# Patient Record
Sex: MALE | Race: WHITE | NOT HISPANIC OR LATINO | Employment: OTHER | URBAN - METROPOLITAN AREA
[De-identification: names, ages, dates, MRNs, and addresses within clinical notes are randomized per-mention and may not be internally consistent; named-entity substitution may affect disease eponyms.]

---

## 2022-09-19 ENCOUNTER — HOSPITAL ENCOUNTER (INPATIENT)
Facility: CLINIC | Age: 87
LOS: 1 days | Discharge: ANOTHER HOSPITAL | DRG: 066 | End: 2022-09-21
Attending: EMERGENCY MEDICINE | Admitting: INTERNAL MEDICINE
Payer: COMMERCIAL

## 2022-09-19 ENCOUNTER — APPOINTMENT (OUTPATIENT)
Dept: MRI IMAGING | Facility: CLINIC | Age: 87
DRG: 066 | End: 2022-09-19
Attending: EMERGENCY MEDICINE
Payer: COMMERCIAL

## 2022-09-19 DIAGNOSIS — I44.2 COMPLETE HEART BLOCK (H): ICD-10-CM

## 2022-09-19 DIAGNOSIS — I63.9 CEREBROVASCULAR ACCIDENT (CVA), UNSPECIFIED MECHANISM (H): ICD-10-CM

## 2022-09-19 LAB
ANION GAP SERPL CALCULATED.3IONS-SCNC: 10 MMOL/L (ref 7–15)
BASOPHILS # BLD AUTO: 0 10E3/UL (ref 0–0.2)
BASOPHILS NFR BLD AUTO: 0 %
BUN SERPL-MCNC: 27.8 MG/DL (ref 8–23)
CALCIUM SERPL-MCNC: 9.2 MG/DL (ref 8.8–10.2)
CHLORIDE SERPL-SCNC: 104 MMOL/L (ref 98–107)
CREAT SERPL-MCNC: 1.07 MG/DL (ref 0.67–1.17)
DEPRECATED HCO3 PLAS-SCNC: 27 MMOL/L (ref 22–29)
EOSINOPHIL # BLD AUTO: 0.1 10E3/UL (ref 0–0.7)
EOSINOPHIL NFR BLD AUTO: 1 %
ERYTHROCYTE [DISTWIDTH] IN BLOOD BY AUTOMATED COUNT: 14.3 % (ref 10–15)
GFR SERPL CREATININE-BSD FRML MDRD: 68 ML/MIN/1.73M2
GLUCOSE SERPL-MCNC: 89 MG/DL (ref 70–99)
HCT VFR BLD AUTO: 42.1 % (ref 40–53)
HGB BLD-MCNC: 13.2 G/DL (ref 13.3–17.7)
HOLD SPECIMEN: NORMAL
IMM GRANULOCYTES # BLD: 0 10E3/UL
IMM GRANULOCYTES NFR BLD: 0 %
LYMPHOCYTES # BLD AUTO: 1.8 10E3/UL (ref 0.8–5.3)
LYMPHOCYTES NFR BLD AUTO: 31 %
MCH RBC QN AUTO: 30.9 PG (ref 26.5–33)
MCHC RBC AUTO-ENTMCNC: 31.4 G/DL (ref 31.5–36.5)
MCV RBC AUTO: 99 FL (ref 78–100)
MONOCYTES # BLD AUTO: 0.6 10E3/UL (ref 0–1.3)
MONOCYTES NFR BLD AUTO: 10 %
NEUTROPHILS # BLD AUTO: 3.4 10E3/UL (ref 1.6–8.3)
NEUTROPHILS NFR BLD AUTO: 58 %
NRBC # BLD AUTO: 0 10E3/UL
NRBC BLD AUTO-RTO: 0 /100
PLATELET # BLD AUTO: 133 10E3/UL (ref 150–450)
POTASSIUM SERPL-SCNC: 4.6 MMOL/L (ref 3.4–5.3)
RBC # BLD AUTO: 4.27 10E6/UL (ref 4.4–5.9)
SODIUM SERPL-SCNC: 141 MMOL/L (ref 136–145)
TROPONIN T SERPL HS-MCNC: 26 NG/L
WBC # BLD AUTO: 5.9 10E3/UL (ref 4–11)

## 2022-09-19 PROCEDURE — 99285 EMERGENCY DEPT VISIT HI MDM: CPT | Mod: CS,25

## 2022-09-19 PROCEDURE — A9585 GADOBUTROL INJECTION: HCPCS | Performed by: EMERGENCY MEDICINE

## 2022-09-19 PROCEDURE — 80061 LIPID PANEL: CPT | Performed by: INTERNAL MEDICINE

## 2022-09-19 PROCEDURE — 85025 COMPLETE CBC W/AUTO DIFF WBC: CPT | Performed by: EMERGENCY MEDICINE

## 2022-09-19 PROCEDURE — 51798 US URINE CAPACITY MEASURE: CPT

## 2022-09-19 PROCEDURE — 36415 COLL VENOUS BLD VENIPUNCTURE: CPT | Performed by: EMERGENCY MEDICINE

## 2022-09-19 PROCEDURE — 70549 MR ANGIOGRAPH NECK W/O&W/DYE: CPT

## 2022-09-19 PROCEDURE — 85014 HEMATOCRIT: CPT | Performed by: EMERGENCY MEDICINE

## 2022-09-19 PROCEDURE — 70544 MR ANGIOGRAPHY HEAD W/O DYE: CPT

## 2022-09-19 PROCEDURE — 93005 ELECTROCARDIOGRAM TRACING: CPT

## 2022-09-19 PROCEDURE — 83036 HEMOGLOBIN GLYCOSYLATED A1C: CPT | Performed by: INTERNAL MEDICINE

## 2022-09-19 PROCEDURE — 80048 BASIC METABOLIC PNL TOTAL CA: CPT | Performed by: EMERGENCY MEDICINE

## 2022-09-19 PROCEDURE — 82310 ASSAY OF CALCIUM: CPT | Performed by: EMERGENCY MEDICINE

## 2022-09-19 PROCEDURE — 70553 MRI BRAIN STEM W/O & W/DYE: CPT

## 2022-09-19 PROCEDURE — C9803 HOPD COVID-19 SPEC COLLECT: HCPCS

## 2022-09-19 PROCEDURE — 255N000002 HC RX 255 OP 636: Performed by: EMERGENCY MEDICINE

## 2022-09-19 PROCEDURE — 84484 ASSAY OF TROPONIN QUANT: CPT | Performed by: EMERGENCY MEDICINE

## 2022-09-19 RX ORDER — GADOBUTROL 604.72 MG/ML
10 INJECTION INTRAVENOUS ONCE
Status: COMPLETED | OUTPATIENT
Start: 2022-09-19 | End: 2022-09-19

## 2022-09-19 RX ADMIN — GADOBUTROL 10 ML: 604.72 INJECTION INTRAVENOUS at 23:40

## 2022-09-19 ASSESSMENT — ACTIVITIES OF DAILY LIVING (ADL)
ADLS_ACUITY_SCORE: 33
ADLS_ACUITY_SCORE: 35

## 2022-09-20 ENCOUNTER — APPOINTMENT (OUTPATIENT)
Dept: CARDIOLOGY | Facility: CLINIC | Age: 87
DRG: 066 | End: 2022-09-20
Attending: INTERNAL MEDICINE
Payer: COMMERCIAL

## 2022-09-20 ENCOUNTER — APPOINTMENT (OUTPATIENT)
Dept: PHYSICAL THERAPY | Facility: CLINIC | Age: 87
DRG: 066 | End: 2022-09-20
Attending: INTERNAL MEDICINE
Payer: COMMERCIAL

## 2022-09-20 ENCOUNTER — APPOINTMENT (OUTPATIENT)
Dept: OCCUPATIONAL THERAPY | Facility: CLINIC | Age: 87
DRG: 066 | End: 2022-09-20
Attending: INTERNAL MEDICINE
Payer: COMMERCIAL

## 2022-09-20 PROBLEM — I63.9 CEREBROVASCULAR ACCIDENT (CVA), UNSPECIFIED MECHANISM (H): Status: ACTIVE | Noted: 2022-09-20

## 2022-09-20 LAB
ATRIAL RATE - MUSE: 56 BPM
CHOLEST SERPL-MCNC: 117 MG/DL
DIASTOLIC BLOOD PRESSURE - MUSE: NORMAL MMHG
GLUCOSE BLDC GLUCOMTR-MCNC: 147 MG/DL (ref 70–99)
GLUCOSE BLDC GLUCOMTR-MCNC: 84 MG/DL (ref 70–99)
GLUCOSE BLDC GLUCOMTR-MCNC: 88 MG/DL (ref 70–99)
HBA1C MFR BLD: 5.6 %
HDLC SERPL-MCNC: 63 MG/DL
INTERPRETATION ECG - MUSE: NORMAL
LDLC SERPL CALC-MCNC: 45 MG/DL
LVEF ECHO: NORMAL
NONHDLC SERPL-MCNC: 54 MG/DL
P AXIS - MUSE: NORMAL DEGREES
PR INTERVAL - MUSE: NORMAL MS
QRS DURATION - MUSE: 120 MS
QT - MUSE: 478 MS
QTC - MUSE: 461 MS
R AXIS - MUSE: -35 DEGREES
SARS-COV-2 RNA RESP QL NAA+PROBE: NEGATIVE
SYSTOLIC BLOOD PRESSURE - MUSE: NORMAL MMHG
T AXIS - MUSE: 70 DEGREES
TRIGL SERPL-MCNC: 46 MG/DL
TROPONIN T SERPL HS-MCNC: 23 NG/L
TROPONIN T SERPL HS-MCNC: 24 NG/L
TROPONIN T SERPL HS-MCNC: 26 NG/L
TSH SERPL DL<=0.005 MIU/L-ACNC: 3.94 UIU/ML (ref 0.3–4.2)
VENTRICULAR RATE- MUSE: 56 BPM

## 2022-09-20 PROCEDURE — 97165 OT EVAL LOW COMPLEX 30 MIN: CPT | Mod: GO

## 2022-09-20 PROCEDURE — 93010 ELECTROCARDIOGRAM REPORT: CPT | Performed by: INTERNAL MEDICINE

## 2022-09-20 PROCEDURE — 250N000013 HC RX MED GY IP 250 OP 250 PS 637: Performed by: INTERNAL MEDICINE

## 2022-09-20 PROCEDURE — 93306 TTE W/DOPPLER COMPLETE: CPT | Mod: 26 | Performed by: INTERNAL MEDICINE

## 2022-09-20 PROCEDURE — 999N000208 ECHOCARDIOGRAM COMPLETE

## 2022-09-20 PROCEDURE — 120N000001 HC R&B MED SURG/OB

## 2022-09-20 PROCEDURE — G0425 INPT/ED TELECONSULT30: HCPCS | Mod: G0 | Performed by: NURSE PRACTITIONER

## 2022-09-20 PROCEDURE — U0005 INFEC AGEN DETEC AMPLI PROBE: HCPCS | Performed by: EMERGENCY MEDICINE

## 2022-09-20 PROCEDURE — 84484 ASSAY OF TROPONIN QUANT: CPT | Performed by: EMERGENCY MEDICINE

## 2022-09-20 PROCEDURE — 97530 THERAPEUTIC ACTIVITIES: CPT | Mod: GP | Performed by: PHYSICAL THERAPIST

## 2022-09-20 PROCEDURE — 99221 1ST HOSP IP/OBS SF/LOW 40: CPT | Mod: FS | Performed by: INTERNAL MEDICINE

## 2022-09-20 PROCEDURE — 99207 PR NO BILLABLE SERVICE THIS VISIT: CPT | Performed by: STUDENT IN AN ORGANIZED HEALTH CARE EDUCATION/TRAINING PROGRAM

## 2022-09-20 PROCEDURE — 36415 COLL VENOUS BLD VENIPUNCTURE: CPT | Performed by: INTERNAL MEDICINE

## 2022-09-20 PROCEDURE — 97116 GAIT TRAINING THERAPY: CPT | Mod: GP | Performed by: PHYSICAL THERAPIST

## 2022-09-20 PROCEDURE — 97535 SELF CARE MNGMENT TRAINING: CPT | Mod: GO

## 2022-09-20 PROCEDURE — 255N000002 HC RX 255 OP 636: Performed by: INTERNAL MEDICINE

## 2022-09-20 PROCEDURE — 250N000013 HC RX MED GY IP 250 OP 250 PS 637: Performed by: HOSPITALIST

## 2022-09-20 PROCEDURE — 84443 ASSAY THYROID STIM HORMONE: CPT | Performed by: NURSE PRACTITIONER

## 2022-09-20 PROCEDURE — 999N000226 HC STATISTIC SLP IP EVAL DEFER

## 2022-09-20 PROCEDURE — 97161 PT EVAL LOW COMPLEX 20 MIN: CPT | Mod: GP | Performed by: PHYSICAL THERAPIST

## 2022-09-20 PROCEDURE — 99223 1ST HOSP IP/OBS HIGH 75: CPT | Mod: AI | Performed by: INTERNAL MEDICINE

## 2022-09-20 PROCEDURE — 250N000013 HC RX MED GY IP 250 OP 250 PS 637: Performed by: EMERGENCY MEDICINE

## 2022-09-20 PROCEDURE — 84484 ASSAY OF TROPONIN QUANT: CPT | Performed by: INTERNAL MEDICINE

## 2022-09-20 RX ORDER — ASPIRIN 81 MG/1
81 TABLET ORAL
COMMUNITY
End: 2024-04-17 | Stop reason: ALTCHOICE

## 2022-09-20 RX ORDER — ASPIRIN 81 MG/1
81 TABLET ORAL DAILY
Status: DISCONTINUED | OUTPATIENT
Start: 2022-09-20 | End: 2022-09-21 | Stop reason: HOSPADM

## 2022-09-20 RX ORDER — FAMOTIDINE 40 MG/5ML
20 POWDER, FOR SUSPENSION ORAL 2 TIMES DAILY
Status: DISCONTINUED | OUTPATIENT
Start: 2022-09-20 | End: 2022-09-21 | Stop reason: HOSPADM

## 2022-09-20 RX ORDER — FAMOTIDINE 20 MG/1
20 TABLET, FILM COATED ORAL 2 TIMES DAILY
Status: DISCONTINUED | OUTPATIENT
Start: 2022-09-20 | End: 2022-09-21 | Stop reason: HOSPADM

## 2022-09-20 RX ORDER — ASPIRIN 81 MG/1
81 TABLET, CHEWABLE ORAL DAILY
Status: DISCONTINUED | OUTPATIENT
Start: 2022-09-20 | End: 2022-09-21 | Stop reason: HOSPADM

## 2022-09-20 RX ORDER — LIDOCAINE 40 MG/G
CREAM TOPICAL
Status: DISCONTINUED | OUTPATIENT
Start: 2022-09-20 | End: 2022-09-21 | Stop reason: HOSPADM

## 2022-09-20 RX ORDER — LOSARTAN POTASSIUM 25 MG/1
37.5 TABLET ORAL
COMMUNITY
End: 2024-06-03

## 2022-09-20 RX ORDER — ATORVASTATIN CALCIUM 40 MG/1
40 TABLET, FILM COATED ORAL EVERY EVENING
Status: DISCONTINUED | OUTPATIENT
Start: 2022-09-20 | End: 2022-09-21 | Stop reason: HOSPADM

## 2022-09-20 RX ORDER — ATORVASTATIN CALCIUM 40 MG/1
40 TABLET, FILM COATED ORAL
COMMUNITY
End: 2024-09-09

## 2022-09-20 RX ORDER — MULTIVITAMIN,THERAPEUTIC
1 TABLET ORAL
COMMUNITY

## 2022-09-20 RX ORDER — ASPIRIN 325 MG
325 TABLET ORAL ONCE
Status: COMPLETED | OUTPATIENT
Start: 2022-09-20 | End: 2022-09-20

## 2022-09-20 RX ADMIN — FAMOTIDINE 20 MG: 20 TABLET ORAL at 07:58

## 2022-09-20 RX ADMIN — FAMOTIDINE 20 MG: 20 TABLET ORAL at 20:57

## 2022-09-20 RX ADMIN — ASPIRIN 81 MG: 81 TABLET, COATED ORAL at 07:58

## 2022-09-20 RX ADMIN — ATORVASTATIN CALCIUM 40 MG: 40 TABLET, FILM COATED ORAL at 20:57

## 2022-09-20 RX ADMIN — HUMAN ALBUMIN MICROSPHERES AND PERFLUTREN 3 ML: 10; .22 INJECTION, SOLUTION INTRAVENOUS at 14:23

## 2022-09-20 RX ADMIN — ASPIRIN 325 MG ORAL TABLET 325 MG: 325 PILL ORAL at 01:30

## 2022-09-20 ASSESSMENT — ACTIVITIES OF DAILY LIVING (ADL)
WEAR_GLASSES_OR_BLIND: YES
WALKING_OR_CLIMBING_STAIRS_DIFFICULTY: YES
ADLS_ACUITY_SCORE: 28
ADLS_ACUITY_SCORE: 35
ADLS_ACUITY_SCORE: 35
ADLS_ACUITY_SCORE: 28
VISION_MANAGEMENT: GLASSES
NUMBER_OF_TIMES_PATIENT_HAS_FALLEN_WITHIN_LAST_SIX_MONTHS: 1
ADLS_ACUITY_SCORE: 35
EQUIPMENT_CURRENTLY_USED_AT_HOME: CANE, STRAIGHT
CHANGE_IN_FUNCTIONAL_STATUS_SINCE_ONSET_OF_CURRENT_ILLNESS/INJURY: YES
ADLS_ACUITY_SCORE: 35
DIFFICULTY_EATING/SWALLOWING: NO
ADLS_ACUITY_SCORE: 41
CONCENTRATING,_REMEMBERING_OR_MAKING_DECISIONS_DIFFICULTY: NO
ADLS_ACUITY_SCORE: 41
WALKING_OR_CLIMBING_STAIRS: STAIR CLIMBING DIFFICULTY, REQUIRES EQUIPMENT
DOING_ERRANDS_INDEPENDENTLY_DIFFICULTY: NO
ADLS_ACUITY_SCORE: 35
TRANSFERRING: 0-->INDEPENDENT
FALL_HISTORY_WITHIN_LAST_SIX_MONTHS: YES
ADLS_ACUITY_SCORE: 28
ADLS_ACUITY_SCORE: 35
ADLS_ACUITY_SCORE: 41
TOILETING_ISSUES: NO
HEARING_DIFFICULTY_OR_DEAF: YES
DRESSING/BATHING_DIFFICULTY: NO
TRANSFERRING: 0-->INDEPENDENT

## 2022-09-20 ASSESSMENT — ENCOUNTER SYMPTOMS
SPEECH DIFFICULTY: 0
ABDOMINAL PAIN: 0
WEAKNESS: 0
NUMBNESS: 0
DIZZINESS: 1

## 2022-09-20 NOTE — CONSULTS
Care Management Note    Length of Stay (days): 0    Expected Discharge Date: 09/21/2022     Concerns to be Addressed: discharge planning      Patient plan of care discussed at interdisciplinary rounds: Yes    Anticipated Discharge Disposition: Home     Anticipated Discharge Services:  OP PT  Anticipated Discharge DME:      Referrals Placed by CM/SW:    Private pay costs discussed: Not applicable    Additional Information:  Received CM/SW consult for discharge planning as part of stroke order set. Noted PT has assessed pt and currently recommending home with assist; home with OP PT. Appears pt is from home with his spouse. Discussed with nursing who does not anticipate any CM/SW needs at this time.     SW will not continue to follow. Please alert SW or place new consult if needs arise.     CARLY Braun, LSW  Inpatient Care Coordination  MS3, [facility]  [phone]    CARLY Kingsley

## 2022-09-20 NOTE — PROGRESS NOTES
Speech Language Pathology: Orders received. Chart reviewed and discussed with care team.?This patient does not currently present with facial weakness, slurred speech or communication impairment.  He passed RN swallow screen without difficulty and a regular diet with thin liquids has been ordered.  Speech Language Pathology not indicated at this time due to intact speech and swallow skills.? Defer discharge recommendations to medical team and OT/PT.? Will complete orders.

## 2022-09-20 NOTE — ED PROVIDER NOTES
History   Chief Complaint:  Dizziness and Balance Issues    The history is provided by the patient.      Benjamin Elizabeth is a 87 year old male with history of coronary artery disease, hypertension, and ascending aorta dilation who presents with dizziness and balance issues which waxes and wanes but has been continuous for three days. Patient was told to come to ED after visiting Urgent car today. Patient denies falling. Patient denies chest pain, headache, numbness, weakness, or abdominal pain. Patient states he had vertigo years ago but states symptoms feel different than vertigo. Patient reports he has never had a stroke or atrial fibrillation. Patient's wife states the patient's speech has not changed.    Review of Systems   Cardiovascular: Negative for chest pain.   Gastrointestinal: Negative for abdominal pain.   Neurological: Positive for dizziness. Negative for speech difficulty, weakness and numbness.        + Balance Issues   All other systems reviewed and are negative.        Allergies:  No Known Allergies    Medications:  Aspirin 81 mg  Terbinafine  Losartan  Atorvastatin    Past Medical History:     Dyslipidemia  Ascending aorta dilation  Nonheumatic aortic valve insufficiency  Nonheumatic mitral valve regurgitation  White coat syndrome with hypertension  Coronary artery disease involving native coronary artery of native heart without angina pectoris  Anemia due to vitamin B12 deficiency  BPH  Degenerative disc disease lumbar  Cataract extraction status  ECG abnormality, first degree av block and left axis  Cervicalgia  Osteoarthritis of hip  Senile nuclear cataract  Vitreous degeneration  Migraine with aura  Hearing loss  Essential hypertension  Essential hypertension     Past Surgical History:    SURGICAL HX - NEG  CATARACT REMOVALT W/CORNEO-SCLERAL, right, left    Family History:    Father- cancer, hypertension  Mother- cataract, glaucoma, hypertension     Social History:  PCP: Clinic,  [facility]   Patient arrived by car  Patient presents with wife    Physical Exam     Patient Vitals for the past 24 hrs:   BP Temp Temp src Pulse Resp SpO2 Weight   09/19/22 1925 (!) 121/93 97.3  F (36.3  C) Temporal 54 20 98 % 91.8 kg (202 lb 6.1 oz)       Physical Exam  Constitutional:       General: He is not in acute distress.     Appearance: He is not diaphoretic.   HENT:      Head: Atraumatic.   Eyes:      General: No scleral icterus.     Pupils: Pupils are equal, round, and reactive to light.   Cardiovascular:      Rate and Rhythm: Regular rhythm. Bradycardia present.      Heart sounds: Normal heart sounds.      Comments: Well perfused  Pulmonary:      Effort: No respiratory distress.      Breath sounds: Normal breath sounds.      Comments: No accessory muscle use  Abdominal:      General: Bowel sounds are normal.      Palpations: Abdomen is soft.      Tenderness: There is no abdominal tenderness.   Musculoskeletal:         General: No tenderness.      Cervical back: Neck supple.   Skin:     General: Skin is warm.      Findings: No rash.   Neurological:      Comments: Speech is fluent.  No facial asymmetry.  No nystagmus.   strength 5 out of 5 and a bilaterally.  Strength in lower extremities 5 out of 5 and equal bilaterally.  The patient does have an unsteady gait and requires assistance to ambulate.  Sensation light touch intact and symmetric over the face, arm, and legs.  No arm drift.  No finger-nose dysmetria.           Emergency Department Course   ECG  ECG taken at 1952, ECG read at 1959  Atrial fibrillation with slow ventricular response   Left axis deviation  Incomplete left bundle branch block  Nonspecific ST and T wave abnormality  Type 3 block  Rate 56 bpm. IA interval * ms. QRS duration 120 ms. QT/QTc 478/461 ms. P-R-T axes * -35 70.     Imaging:  MR Head w/o Contrast Angiogram   Final Result   IMPRESSION:   HEAD MRI:    1.  Equivocal punctate focus of ischemic lacunar infarction in  the deep right frontal white matter.   2.  Age-related changes with multifocal areas of chronic infarction as described in detail above.      HEAD MRA:    1.  No aneurysm, high flow AVM or significant stenosis identified.   2.  Variant [place] anatomy as above.      NECK MRA:   1.  No measurable stenosis or dissection.      MR Brain w/o & w Contrast   Final Result   IMPRESSION:   HEAD MRI:    1.  Equivocal punctate focus of ischemic lacunar infarction in the deep right frontal white matter.   2.  Age-related changes with multifocal areas of chronic infarction as described in detail above.      HEAD MRA:    1.  No aneurysm, high flow AVM or significant stenosis identified.   2.  Variant [place] anatomy as above.      NECK MRA:   1.  No measurable stenosis or dissection.      MRA Angiogram Neck w/o & w Contrast   Final Result   IMPRESSION:   HEAD MRI:    1.  Equivocal punctate focus of ischemic lacunar infarction in the deep right frontal white matter.   2.  Age-related changes with multifocal areas of chronic infarction as described in detail above.      HEAD MRA:    1.  No aneurysm, high flow AVM or significant stenosis identified.   2.  Variant [place] anatomy as above.      NECK MRA:   1.  No measurable stenosis or dissection.        Report per radiology    Laboratory:  Labs Ordered and Resulted from Time of ED Arrival to Time of ED Departure   TROPONIN T, HIGH SENSITIVITY - Abnormal       Result Value    Troponin T, High Sensitivity 26 (*)    BASIC METABOLIC PANEL - Abnormal    Sodium 141      Potassium 4.6      Chloride 104      Carbon Dioxide (CO2) 27      Anion Gap 10      Urea Nitrogen 27.8 (*)     Creatinine 1.07      Calcium 9.2      Glucose 89      GFR Estimate 68     CBC WITH PLATELETS AND DIFFERENTIAL - Abnormal    WBC Count 5.9      RBC Count 4.27 (*)     Hemoglobin 13.2 (*)     Hematocrit 42.1      MCV 99      MCH 30.9      MCHC 31.4 (*)     RDW 14.3      Platelet Count 133  (*)     % Neutrophils 58      % Lymphocytes 31      % Monocytes 10      % Eosinophils 1      % Basophils 0      % Immature Granulocytes 0      NRBCs per 100 WBC 0      Absolute Neutrophils 3.4      Absolute Lymphocytes 1.8      Absolute Monocytes 0.6      Absolute Eosinophils 0.1      Absolute Basophils 0.0      Absolute Immature Granulocytes 0.0      Absolute NRBCs 0.0     COVID-19 VIRUS (CORONAVIRUS) BY PCR      Emergency Department Course:     Reviewed:  I reviewed nursing notes, vitals, past medical history and Care Everywhere    Assessments:  2055 I obtained history and examined the patient as noted above.   0020 I rechecked the patient and explained findings.     Consults:  0016 I consulted with Dr. Engle, stroke/neuro, regarding the patient's history and presentation here in the emergency department.  0025 I consulted with Dr. Alves, hospitalist, regarding the patient's history and presentation here in the emergency department who accepted the patient for admission.  0035 I consulted with Dr. Wood, cardiology, regarding the patient's history and presentation here in the emergency department and his EKG.    Interventions:  2340 Gadobutrol 10 mL IV    Disposition:  The patient was admitted to the hospital under the care of Dr. Alves.     Impression & Plan     Medical Decision Making:  This patient is an 87-year-old man who presents to the ED for further evaluation of gait ataxia.  He was referred from his outpatient clinic.  MRI does not appear to demonstrate a posterior circulation stroke at this time.  No vascular dissection or significant occlusion.  He does have what appears to be a subacute infarct in the right frontal lobe.  This was reviewed with stroke neurology.  At this point aspirin is recommended.    The patient is known to have coronary artery disease.  Per my review of the record and talking the patient he has not been known to have any dysrhythmia in the past.  EKG today was reviewed with  Dr. Vyas cardiology and I appreciate his input.  He was concerned that this is a complete heart block.  However, no immediate intervention is recommended given adequate heart rate and blood pressure and mental status.  Cardiology will formally consult in the morning for further recommendations.  He may eventually require pacemaker.    The patient will be admitted to the hospital service for further work-up.    Diagnosis:    ICD-10-CM    1. Cerebrovascular accident (CVA), unspecified mechanism (H)  I63.9    2. Complete heart block (H)  I44.2        Scribe Disclosure:  I, Andreea Dooley, am serving as a scribe at 10:54 PM on 9/19/2022 to document services personally performed by Lester Bowman MD based on my observations and the provider's statements to me.            Lester Bowman MD  09/20/22 0109

## 2022-09-20 NOTE — PHARMACY-ADMISSION MEDICATION HISTORY
Admission medication history interview status for this patient is complete. See [facility] admission navigator for allergy information, prior to admission medications and immunization status.     Medication history interview done, indicate source(s): Patient  Medication history resources (including written lists, pill bottles, clinic record):None  Pharmacy: [facility]    Changes made to PTA medication list:  Added: all  Changed: none  Reported as Not Taking: none  Removed: none    Actions taken by pharmacist (provider contacted, etc):None     Additional medication history information:None    Medication reconciliation/reorder completed by provider prior to medication history?  N   (Y/N)       Prior to Admission medications    Medication Sig Last Dose Taking? Auth Provider Long Term End Date   aspirin 81 MG EC tablet Take 81 mg by mouth daily 9/18/2022 at pm Yes Unknown, Entered By History     atorvastatin (LIPITOR) 40 MG tablet Take 40 mg by mouth daily 9/18/2022 at pm Yes Unknown, Entered By History Yes    losartan (COZAAR) 25 MG tablet Take 37.5 mg by mouth daily 9/18/2022 at pm Yes Unknown, Entered By History Yes    multivitamin, therapeutic (THERA-VIT) TABS tablet Take 1 tablet by mouth daily 9/18/2022 at pm Yes Unknown, Entered By History

## 2022-09-20 NOTE — ED NOTES
[facility]  ED Nurse Handoff Report    Benjamin Elizabeth is a 87 year old male   ED Chief complaint: Dizziness  . ED Diagnosis:   Final diagnoses:   Cerebrovascular accident (CVA), unspecified mechanism (H)   Complete heart block (H)     Allergies: No Known Allergies    Code Status: Full Code  Activity level - Baseline/Home:  Independent. Activity Level - Current:   Stand by Assist. Lift room needed: No. Bariatric: No   Needed: No   Isolation: No. Infection: Not Applicable.     Vital Signs:   Vitals:    09/19/22 1925 09/20/22 0136   BP: (!) 121/93 (!) 178/77   Pulse: 54    Resp: 20    Temp: 97.3  F (36.3  C)    TempSrc: Temporal    SpO2: 98%    Weight: 91.8 kg (202 lb 6.1 oz)        Cardiac Rhythm:  ,      Pain level:    Patient confused: Yes. Patient Falls Risk: Yes.   Elimination Status: Has voided   Patient Report - Initial Complaint: dizziness. Focused Assessment: Pt arrives to ED with c/o feeling off balance and dizziness. Pt states this began Friday morning. Pt normally can walk on his own but it usign a cane right now. Denies weakness on one side or certain extremeties. Pt was seen at  and told to come to ED for MRI to r/o stroke.   Tests Performed: labs, imaging Abnormal Results:   Labs Ordered and Resulted from Time of ED Arrival to Time of ED Departure   TROPONIN T, HIGH SENSITIVITY - Abnormal       Result Value    Troponin T, High Sensitivity 26 (*)    BASIC METABOLIC PANEL - Abnormal    Sodium 141      Potassium 4.6      Chloride 104      Carbon Dioxide (CO2) 27      Anion Gap 10      Urea Nitrogen 27.8 (*)     Creatinine 1.07      Calcium 9.2      Glucose 89      GFR Estimate 68     CBC WITH PLATELETS AND DIFFERENTIAL - Abnormal    WBC Count 5.9      RBC Count 4.27 (*)     Hemoglobin 13.2 (*)     Hematocrit 42.1      MCV 99      MCH 30.9      MCHC 31.4 (*)     RDW 14.3      Platelet Count 133 (*)     % Neutrophils 58      % Lymphocytes 31      % Monocytes 10      % Eosinophils  1      % Basophils 0      % Immature Granulocytes 0      NRBCs per 100 WBC 0      Absolute Neutrophils 3.4      Absolute Lymphocytes 1.8      Absolute Monocytes 0.6      Absolute Eosinophils 0.1      Absolute Basophils 0.0      Absolute Immature Granulocytes 0.0      Absolute NRBCs 0.0     COVID-19 VIRUS (CORONAVIRUS) BY PCR     MR Head w/o Contrast Angiogram   Final Result   IMPRESSION:   HEAD MRI:    1.  Equivocal punctate focus of ischemic lacunar infarction in the deep right frontal white matter.   2.  Age-related changes with multifocal areas of chronic infarction as described in detail above.      HEAD MRA:    1.  No aneurysm, high flow AVM or significant stenosis identified.   2.  Variant [place] of [place] anatomy as above.      NECK MRA:   1.  No measurable stenosis or dissection.      MR Brain w/o & w Contrast   Final Result   IMPRESSION:   HEAD MRI:    1.  Equivocal punctate focus of ischemic lacunar infarction in the deep right frontal white matter.   2.  Age-related changes with multifocal areas of chronic infarction as described in detail above.      HEAD MRA:    1.  No aneurysm, high flow AVM or significant stenosis identified.   2.  Variant [place] anatomy as above.      NECK MRA:   1.  No measurable stenosis or dissection.      MRA Angiogram Neck w/o & w Contrast   Final Result   IMPRESSION:   HEAD MRI:    1.  Equivocal punctate focus of ischemic lacunar infarction in the deep right frontal white matter.   2.  Age-related changes with multifocal areas of chronic infarction as described in detail above.      HEAD MRA:    1.  No aneurysm, high flow AVM or significant stenosis identified.   2.  Variant [place] anatomy as above.      NECK MRA:   1.  No measurable stenosis or dissection.        Treatments provided: See MAR  Family Comments: family was at bedside and is aware of pt status  OBS brochure/video discussed/provided to patient:  No  ED Medications:   Medications   gadobutrol  (GADAVIST) injection 10 mL (10 mLs Intravenous Given 9/19/22 2340)   aspirin (ASA) tablet 325 mg (325 mg Oral Given 9/20/22 0130)     Drips infusing:  No  For the majority of the shift, the patient's behavior Green. Interventions performed were N/A.    Sepsis treatment initiated: No     Patient tested for COVID 19 prior to admission: YES    ED Nurse Name/Phone Number: Maile Jimenez RN,   1:43 AM    RECEIVING UNIT ED HANDOFF REVIEW    Above ED Nurse Handoff Report was reviewed: Yes  Reviewed by: Miya Evans RN on September 20, 2022 at 9:03 AM

## 2022-09-20 NOTE — PROGRESS NOTES
09/20/22 1500   Quick Adds   Type of Visit Initial Occupational Therapy Evaluation   Living Environment   People in Home spouse   Current Living Arrangements house   Home Accessibility stairs to enter home;stairs within home   Number of Stairs, Main Entrance 2   Stair Railings, Main Entrance railings safe and in good condition   Stair Railings, Within Home, Primary railings safe and in good condition   Transportation Anticipated family or friend will provide   Living Environment Comments 2 story home. tub shower combo   Self-Care   Usual Activity Tolerance good   Current Activity Tolerance moderate   Regular Exercise Yes   Activity/Exercise Type walking  ([place])   Exercise Amount/Frequency 2 times/wk   Equipment Currently Used at Home none   Fall history within last six months no   Activity/Exercise/Self-Care Comment typically indp with self care and ADL   Instrumental Activities of Daily Living (IADL)   IADL Comments indp baseline driving, med mgmt, cleaning. etc. wife does cooking   General Information   Onset of Illness/Injury or Date of Surgery 09/19/22   Referring Physician Tanja Alves MD   Patient/Family Therapy Goal Statement (OT) to go home   Additional Occupational Profile Info/Pertinent History of Current Problem Benjamin Elizabeth is a 87 year old male with past medical history significant for hypertension, hyperlipidemia, coronary artery disease presented with gait instability and lightheadedness for 3 days.found to have acute CVA   Existing Precautions/Restrictions fall   General Observations and Info agreeable to OT   Cognitive Status Examination   Orientation Status orientation to person, place and time   Affect/Mental Status (Cognitive) WNL   Follows Commands WNL   Cognitive Status Comments pt able to recall medications and room number during [facility]. [place]. aware of safety needs and good judement noted during tasks. problem solving observed during  shower tx   Visual Perception   Visual  Impairment/Limitations WFL   Sensory   Sensory Quick Adds No deficits were identified   Pain Assessment   Patient Currently in Pain Yes, see Vital Sign flowsheet   Integumentary/Edema   Integumentary/Edema Comments B LUE edema noted below knee   Posture   Posture forward head position;protracted shoulders   Range of Motion Comprehensive   Comment, General Range of Motion WFL for ADL   Strength Comprehensive (MMT)   Comment, General Manual Muscle Testing (MMT) Assessment WFL for ADL   Muscle Tone Assessment   Muscle Tone Quick Adds No deficits were identified   Coordination   Upper Extremity Coordination No deficits were identified   Bed Mobility   Bed Mobility No deficits identified   Transfers   Transfers sit-stand transfer   Sit-Stand Transfer   Sit-Stand [place] (Transfers) supervision   Assistive Device (Sit-Stand Transfers) walker, front-wheeled   Activities of Daily Living   BADL Assessment/Intervention lower body dressing;upper body dressing   Upper Body Dressing Assessment/Training   [place] Level (Upper Body Dressing) independent   Lower Body Dressing Assessment/Training   Comment, (Lower Body Dressing) seated EOB   [place] Level (Lower Body Dressing) contact guard assist   Clinical Impression   Criteria for Skilled Therapeutic Interventions Met (OT) Yes, treatment indicated   OT Diagnosis decreased function in ADL   OT Problem List-Impairments impacting ADL problems related to;activity tolerance impaired;flexibility;mobility   Assessment of Occupational Performance 3-5 Performance Deficits   Identified Performance Deficits bathing, transfers, LB dressing   Planned Therapy Interventions (OT) ADL retraining;progressive activity/exercise;home program guidelines   Clinical Decision Making Complexity (OT) low complexity   Anticipated Equipment Needs Upon Discharge (OT) shower chair   Risk & Benefits of therapy have been explained evaluation/treatment results reviewed;care plan/treatment goals  reviewed;risks/benefits reviewed;current/potential barriers reviewed;participants voiced agreement with care plan;participants included;patient   Clinical Impression Comments decreased function in aDL warrants skilled Ip OT tx   OT Discharge Planning   OT Discharge Recommendation (DC Rec) home with assist   OT Rationale for DC Rec anticipate with skilled IP OT tx pt will be able to return to home safely with assist from wife for ADL. appears to be close to baseline function, mild deficits in balance, strength, activity tolerance. will benefit from iP OT for safe discharge to home. no OT needs anticipated at discharge. supportive wife and high PLOF baseline in ADL/IADL   Total Evaluation Time (Minutes)   Total Evaluation Time (Minutes) 10   OT Goals   Therapy Frequency (OT) Daily   OT Predicted Duration/Target Date for Goal Attainment 09/24/22   OT Goals Toilet Transfer/Toileting;Hygiene/Grooming;OT Goal 1;OT Goal 2   OT: Hygiene/Grooming independent;while standing   OT: Toilet Transfer/Toileting Independent   OT: Goal 1 Pt will verbalize 3 safety techniques for home   OT: Goal 2 Pt will demo tub/shower tx with mod I and bench or chair

## 2022-09-20 NOTE — CONSULTS
"      [facility]    Stroke Telephone Note    I was called by Lester Carroll on 09/20/22 regarding patient Benjamin Elizabeth. The patient is a 87 year old male w/ PMHx of HTN who presents with dizziness and balance issues that were waxing and waning for the past 3 days. No focal symptoms on exam. New onset afib seen in the ED. He states that the symptoms feel different than vertigo. He has no other symptoms such as speech changes or dysarthria.     Imaging Findings                                                                    IMPRESSION:  HEAD MRI:   1.  Equivocal punctate focus of ischemic lacunar infarction in the deep right frontal white matter.  2.  Age-related changes with multifocal areas of chronic infarction as described in detail above.     HEAD MRA:   1.  No aneurysm, high flow AVM or significant stenosis identified.  2.  Variant [place] of [place]ms anatomy as above.     NECK MRA:  1.  No measurable stenosis or dissection.    Impression  Acute ischemic stroke of R frontal  due to cardioembolism- given new onset Afib most likely a subacute punctate stroke due to small vessel disease vs cardioembolism. Further evaluation with stroke work-up is needed.     Balance abnormalities- stroke is likely an incidental finding and doesn't correlate with the balance issues for the past 3 days.     Recommendations   - Use orderset: \"Ischemic Stroke Routine Admission\" or \"Ischemic Stroke No Thrombolytics/No Thrombectomy ICU Admission\"  - Neurochecks and Vital Signs every Q4H   - Permissive HTN; goal SBP < 220 mmHg  - Daily aspirin 81 mg for secondary stroke prevention  - Statin: Lipitor 40mg  - TTE (with Bubble Study if age 60 yrs or less)  - Telemetry, EKG  - Bedside Glucose Monitoring  - A1c, Lipid Panel, Troponin x 3  - PT/OT/SLP  - Stroke Education  - Euthermia, Euglycemia   -Will likely need anticoagulation long term  -Long term goals, BP <130/80, LDL <70, A1c <7      My recommendations " "are based on the information provided over the phone by Benjamin Elizabeth's in-person providers. They are not intended to replace the clinical judgment of his in-person providers. I was not requested to personally see or examine the patient at this time.    The Stroke Staff is Dr. Stapleton .    Melissa Engle MD  Vascular Neurology Fellow  To page me or covering stroke neurology team member, click here: AMCOM   Choose \"On Call\" tab at top, then search dropdown box for \"Neurology Adult\", select location, press Enter, then look for stroke/neuro ICU/telestroke.        "

## 2022-09-20 NOTE — PROGRESS NOTES
09/20/22 1443   Quick Adds   Type of Visit Initial PT Evaluation   Living Environment   People in Home spouse   Current Living Arrangements house   Home Accessibility stairs to enter home;stairs within home   Number of Stairs, Main Entrance 2   Stair Railings, Main Entrance railings safe and in good condition   Number of Stairs, Within Home, Primary greater than 10 stairs  (to bedroom level)   Stair Railings, Within Home, Primary railings safe and in good condition   Transportation Anticipated family or friend will provide   Living Environment Comments lives in a 2 story home   Self-Care   Usual Activity Tolerance good   Current Activity Tolerance moderate   Regular Exercise Yes   Activity/Exercise Type other (see comments);walking  (golfs weekly; treamill)   Exercise Amount/Frequency 2 times/wk   Equipment Currently Used at Home none   Fall history within last six months no   Activity/Exercise/Self-Care Comment normally independent with mobility and cares   General Information   Onset of Illness/Injury or Date of Surgery 09/19/22   Referring Physician Tanja Alves MD   Patient/Family Therapy Goals Statement (PT) return home with spouse   Pertinent History of Current Problem (include personal factors and/or comorbidities that impact the POC) per chart: Benjamin Elizabeth is a 87 year old male with PMH of HTN, HLD, migraine with aura and CAD. He presented to the [facility] ED 9/19/2022 for evaluation of gait instability and lightheadedness. At the time of presentation he reported 3 days of waxing/wanning dizziness and imbalance. He was found to be in new atrial fibrillation; MRI:equivocal punctate focus of ischemic lacunar infarct in the deep right frontal white matter; Type I second-degree AV block Ursula; see medical record for further information   Existing Precautions/Restrictions fall   Heart Disease Risk Factors Medical history   General Observations patient in bed; agreeable to session   Cognition    Orientation Status (Cognition) oriented x 4   Cognitive Status Comments appears intact; forgetful at times   Pain Assessment   Patient Currently in Pain No   Range of Motion (ROM)   ROM Comment WFL   Strength (Manual Muscle Testing)   Strength Comments WFL   Bed Mobility   Comment, (Bed Mobility) independent   Transfers   Comment, (Transfers) CGA;mild unsteadiness   Gait/Stairs (Locomotion)   Comment, (Gait/Stairs) CGA with use of cane   Balance   Balance Comments unsteady; needs UE support/assist   Sensory Examination   Sensory Perception Comments intact per patient report   Coordination   Coordination Comments appears intact during session   Clinical Impression   Criteria for Skilled Therapeutic Intervention Yes, treatment indicated   PT Diagnosis (PT) impaired functional mobility   Influenced by the following impairments new stroke; impaired balance   Functional limitations due to impairments impaired independence with functional mobility and cares secondary to above deficits   Clinical Presentation (PT Evaluation Complexity) Stable/Uncomplicated   Clinical Presentation Rationale clinical judgement; level of assist   Clinical Decision Making (Complexity) low complexity   Planned Therapy Interventions (PT) gait training;stair training;progressive activity/exercise;transfer training   Anticipated Equipment Needs at Discharge (PT) cane, straight;walker, rolling  (pending progress)   Risk & Benefits of therapy have been explained evaluation/treatment results reviewed;care plan/treatment goals reviewed;risks/benefits reviewed;current/potential barriers reviewed;participants voiced agreement with care plan;participants included;patient   Clinical Impression Comments below recent baseline level of function   PT Discharge Planning   PT Discharge Recommendation (DC Rec) home with assist;home with outpatient physical therapy   PT Rationale for DC Rec Anticipate patient will be safe to discharge to home with assist of  spouse and use of cane vs. walker for gait stability; more unsteady than baseline; would benefit from OP PT to address deficits with balance   PT Brief overview of current status A x 1 with cane/walker; falls risk   Total Evaluation Time   Total Evaluation Time (Minutes) 10   Physical Therapy Goals   PT Frequency Daily   PT Predicted Duration/Target Date for Goal Attainment 09/22/22   PT Goals Transfers;Gait;Stairs   PT: Transfers Supervision/stand-by assist;Sit to/from stand;Bed to/from chair;Assistive device   PT: Gait Supervision/stand-by assist;Assistive device;150 feet   PT: Stairs Supervision/stand-by assist;Greater than 10 stairs;Assistive device  (with rail)

## 2022-09-20 NOTE — CONSULTS
"Cardiology Consultation     Benjamin Elizabeth MRN# [id number]   YOB: 1935 Age: 87 year old   Date of Admission: 9/19/2022     Reason for consult: Abnormal EKG per request from Dr Alves      Follows with Dr Ge at [facility] cardiology       Assessment and Plan:   Second degree AV block, Type I (Wenckebach)   -HRs 45- 50 bpm  -continue to avoid AV-chasidy blocking agents  -no indication for pacemaker or evidence of A-fib  -plan for 2 week ZioPatch monitor at discharge  Discussed warning signs for advancement of heart block    Acute vs Subacute Ischemic Stroke:  -right frontal  -presumed cardioembolic from new A-fib vs small vessel disease  -ECHO with bubble study pending  -neurology following  -ASA, statin, PT, BP control    Mild Flat Troponin Elevation:  -troponin HS peak 26   -ECHO pending  -hx of cath (2019) showing mild nonobstructive CAD    Hypertension:  -per neurology  Home Losartan held               Chief Complaint:   3 days of intermittent dizziness and balance issues    History is obtained from the patient and wife          History of Present Illness:   This patient is a 87 year old male who presents to the ED (9/19/22) with three days of intermittent  dizziness and balance issues after first being seen in urgent care.  12-lead EKG stated A-fib with slow ventricular response.  After further review, appears to be Wenckebach at 54 bpm.with incomplete left bundle branch block, left axis deviation  Nonspecific ST abnormalities.  Brain MRI showed punctate focus of ischemia lacunar infarct in deep right frontal matter.  Age-related changes with multifocal areas of chronic infarction  No measurable stenosis or dissection in neck region.  Labs show:  Troponin HS: 26, 26, 24  Sodium: 141  Potassium: 4.6  BUN: 27  Creatinine: 1.07  Hemoglobin 13.2, WBC: 5.9, Hematocrit: 42.1  Platelet: 133  Neurology impression:  \" acute ischemic stroke of right frontal due to cardioembolism due to the new onset " "of A-fib most likely a subacute punctate stroke due to small vessel disease vs cardioembolism\"         He has historically followed by [facility] cardiology.  He started experiencing some exertional chest pain in 2019.  ECHO then showed LVEF 50% with possible inferior, inferiolateral hypokinesis  He further underwent coronary angiogram (11/2019) which showed minimal nonobstructive CAD (20% mid LAD) LVEDp 5 mmHg  EKG then showed sinus bradycardia at 56 bpm with first degree AV block, left anterior fascicular block.    He has since been followed in clinic there for hypertension and ascending aorta/aortic root dilatation.  EKG at time of ECHO (2020) showed Sinus Bradycardia 45-50 bpm with occasional PVCs. His last ECHO (8/2021) showed LVEF 55% with mild-moderate LV enlargement, normal RV function, biatrial enlargement, mild aortic regurgitation/mitral regurgitation, tricuspid regurgitation, aortic root 4.3 cm (stable) and ascending aorta 4.4 cm (stable)  At that time in clinic, he noted continued stable chronic atypical chest pain    He denies any near-syncope or syncopal episodes  He denies any chest pain, significant shortness of breath, or orthopnea  He is mainly concerned about his gait stability.  He usually uses a cane.  He has not noticed any worsening dyspnea on exertion or significant chest pain.  Today he is comfortable in bed without any complaints                Past Medical History:   Coronary artery disease  Mitral regurgitation  Aortic valve insufficiency  Hypertension  Vertigo  Ascending aorta dilatation  Migraine with aura  hyperlipidemia  Dependent leg edema  Anemia due to VIt B12 deficiency  Cervicalgia  Chronic back pain with prior L2 compression fracture          Past Surgical History:   No past surgical history on file.             Social History:     Social History     Tobacco Use     Smoking status: Not on file     Smokeless tobacco: Not on file   Substance Use Topics     Alcohol use: Not " on file             Family History:   Hypertension:  Mother, father, and sister  Diabetes: sister          Immunizations:     Immunization History   Administered Date(s) Administered     COVID-19,PF,Pfizer 12+ Yrs (2022 and After) 05/17/2022             Allergies:   No Known Allergies          Medications:     Medications Prior to Admission   Medication Sig Dispense Refill Last Dose     aspirin 81 MG EC tablet Take 81 mg by mouth daily   9/18/2022 at pm     atorvastatin (LIPITOR) 40 MG tablet Take 40 mg by mouth daily   9/18/2022 at pm     losartan (COZAAR) 25 MG tablet Take 37.5 mg by mouth daily   9/18/2022 at pm     multivitamin, therapeutic (THERA-VIT) TABS tablet Take 1 tablet by mouth daily   9/18/2022 at pm             Review of Systems:   The 10 point Review of Systems is negative other than noted in the HPI           Physical Exam:   Vitals were reviewed  Temp: 98  F (36.7  C) Temp src: Oral BP: (!) 155/62 Pulse: (!) 47   Resp: 18 SpO2: 97 % O2 Device: None (Room air)      NEURO: alert and oriented  CHEST:  CTA bilaterally  CARDIO: RRR bradycardic  I/VI systolic murmur  ABD:  Soft    EXT:  Trace to 1+ bilateral ankle edema          Data:     Lab Results   Component Value Date    WBC 5.9 09/19/2022    HGB 13.2 (L) 09/19/2022    HCT 42.1 09/19/2022     (L) 09/19/2022     09/19/2022    POTASSIUM 4.6 09/19/2022    CHLORIDE 104 09/19/2022    CO2 27 09/19/2022    BUN 27.8 (H) 09/19/2022    CR 1.07 09/19/2022    GLC 89 09/19/2022

## 2022-09-20 NOTE — ED TRIAGE NOTES
Pt arrives to ED with c/o feeling off balance and dizziness. Pt states this began Friday morning. Pt normally can walk on his own but it usign a cane right now. Denies weakness on one side or certain extremeties. Pt was seen at  and told to come to ED for MRI to r/o stroke.

## 2022-09-20 NOTE — PROGRESS NOTES
Neuro: A&Ox4.Q4 hour neuro. [place].  Cardiac: SR. VSS.   Respiratory: Sating 97% on RA.  GI/: Adequate urine output via urinal  Diet/appetite: Tolerating Regular diet. Eating well.  Activity:  Assist of 1, gait belt/walker up to chair and in halls.  Pain: At acceptable level on current regimen.   Skin: No new deficits noted. BLLE 2+ Edema  LDA's: 1 PIV    Plan: Continue with POC. Notify primary team with changes.

## 2022-09-20 NOTE — CONSULTS
Patient has Medicare D through [employer].     Xarelto/Eliquis: $47/mo for the remainder of 2022, assuming patient does not start additional high cost medications.     Jantoven (warfarin): $0/mo.     Lindsey Galvan  Pharmacy Technician/Liaison, Discharge Pharmacy   [phone]  [email or web address]

## 2022-09-20 NOTE — H&P
[facility]    Hospitalist History and Physical    Name: Benjamin Elizabeth    MRN: [id number]  YOB: 1935    Age: 87 year old  Date of Admission:  9/19/2022  Date of Service (when I saw the patient): 09/20/22    Assessment & Plan   Benjamin Elizabeth is a 87 year old male with past medical history significant for hypertension, hyperlipidemia, coronary artery disease presented with gait instability and lightheadedness for 3 days.    Evaluation in the emergency room showed equivocal punctate focus of ischemic lacunar infarct in the deep right frontal white matter    Acute CVA  -Neurology consulted in ED. neurology to follow  -Neuro check  -Permissive hypertension  -Daily aspirin 81 mg for secondary stroke from  -Statins 40 mg  -Echocardiogram with bubble study ordered for a.m.  -Monitor on telemetry  -Check hemoglobin A1c lipid panel and serial troponins for  -PT OT  -Fall precaution      Abnormal EKG noted in the ER  Troponin elevated 26: No chest pain  -EKG appears to be bradycardia arrhythmia consistent with Mobitz type I . cannot exclude heart block   -Monitor on telemetry  -Nonspecific T wave changes: Serial troponin  -Cardiac echo in a.m.  -Cardiology consult in a.m.    Hyperlipidemia  -Statins as above    Hypertension  -Prior to admission on losartan we will hold for now permissive hypertension        DVT Prophylaxis: Pneumatic Compression Devices  Code Status: Full Code    Disposition: Admitted as inpatient for acute/subacute CVA  Primary Care Physician   [facility]    Chief Complaint   Gait instability 3 days  Intermittent lightheadedness for 3-day    History is obtained from the patient    History of Present Illness   Benjamin Elizabeth is a 87 year old male with past medical history significant for hypertension hyperlipidemia coronary artery disease presented with unsteady gait and lightheadedness for 3 days    Symptoms started Friday morning.  Patient notes  symptoms were waxing and waning, he had trouble walking without support denies any actual falls had associated leg weakness affected even getting up from his chair.  Given these new findings of weakness and unsteady gait patient called his primary care provider who advised him to come to the emergency room.  In the emergency room stroke work-up including MRI/MRA was done which shows punctate lacunar infarct.  Patient is being admitted for further evaluation and treatment.  He had associated lightheadedness .  Denies any chest pain pressure heaviness or tightness.  No new upper extremity weakness.  No visual changes.  No slurry speech.  No trouble swallowing.  More than 10 point review of systems was carried out was otherwise negative.      Past Medical History    No past medical history on file.  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  First degree av block and left axis   Cervicalgia          Past Surgical History   No past surgical history on file.    Prior to Admission Medications   None     MULTIPLE VITAMIN OR   Take 1 Tablet by mouth daily.   0     Active   acetaminophen (TYLENOL) 325 MG tablet   Take 325-650 mg by mouth every 4 hours as needed for Pain.   0     Active   aspirin 81 MG chewable tablet    Indications: CAD Take 1 Tablet by mouth daily. Indications: CAD 30 Tablet   3 11/22/2019   Active   erythromycin 5  MG/GM (0.5%) eye ointment   Apply to left eye at bedtime. 3.5 g   12 02/02/2022   Active       Additional Information  Patient not taking. Reported on 9/19/2022     terbinafine (LAMISIL) 250 MG tablet    Indications: Onychomycosis Take 1 tablet by mouth once daily 90 Tablet   0 03/23/2022   Active   losartan (COZAAR) 25 MG tablet    Indications: White coat syndrome with hypertension (HRC) Take 1.5 Tablets (37.5 mg) by mouth daily. 135 Tablet   3 06/03/2022   Active   atorvastatin (LIPITOR) 40 MG tablet    Indications: Dyslipidemia (HRC) Take 1 Tablet (40 mg) by mouth every evening. 90 Tablet   3 06/03/2022   Active   tolnaftate (TINACTIN) 1 % powder    Indications: Tinea cruris Apply topically two times a day. 45 g   0 06/03/2022   Active       Allergies   No Known Allergies    Social History   Social History     Tobacco Use     Smoking status: Not on file     Smokeless tobacco: Not on file   Substance Use Topics     Alcohol use: Not on file     Social History     Social History Narrative     Not on file     Lives with his wife independently.  Denies smoking.  Drinks a glass or 2 of wine every night    Family History   I have reviewed this patient's family history and updated it with pertinent information if needed.   No family history on file.  Family history per patient is significant for hypertension    Review of Systems   A Comprehensive greater than 10 system review of systems was carried out.  Pertinent positives and negatives are noted above.  Otherwise negative for contributory information.    Physical Exam   Temp: 97.3  F (36.3  C) Temp src: Temporal BP: (!) 121/93 Pulse: 54   Resp: 20 SpO2: 98 %      Vital Signs with Ranges  Temp:  [97.3  F (36.3  C)] 97.3  F (36.3  C)  Pulse:  [54] 54  Resp:  [20] 20  BP: (121)/(93) 121/93  SpO2:  [98 %] 98 %  202 lbs 6.12 oz    GEN:  Alert, oriented x 3, appears comfortable, no overt distress  HEENT:  Normocephalic/atraumatic, no scleral icterus, no nasal discharge,  mouth dry  CV: Irregular rate, systolic murmur S1 + S2 noted, no S3 or S4.  LUNGS: Few basilar crackles clear to auscultation bilaterally without rales/rhonchi/wheezing/retractions.  Symmetric chest rise on inhalation noted.  ABD:  Active bowel sounds, soft, non-tender/non-distended.  No rebound/guarding/rigidity.  EXT:  No edema.  No cyanosis.  No joint synovitis noted.  SKIN:  Dry to touch, multiple keratosis  NEURO: Awake alert oriented x3, cranial nerves intact, no pronator drift, tone power reflexes within normal limits and involving all extremities.  Finger-to-nose within normal limits.  Did not assess gait    Data   Data reviewed today:  I personally reviewed the EKG tracing showing Bradycardia cardiac Mobitz type I.    No results for input(s): PH, PHV, PO2, PO2V, SAT, PCO2, PCO2V, HCO3, HCO3V in the last 168 hours.  Recent Labs   Lab 09/19/22 1946   WBC 5.9   HGB 13.2*   HCT 42.1   MCV 99   *     Recent Labs   Lab 09/19/22 1946      POTASSIUM 4.6   CHLORIDE 104   CO2 27   ANIONGAP 10   GLC 89   BUN 27.8*   CR 1.07   GFRESTIMATED 68   ERASTO 9.2     7-Day Micro Results     Collected Updated Procedure Result Status      09/20/2022 0131 09/20/2022 0135 Asymptomatic COVID-19 Virus (Coronavirus) by PCR Nasopharyngeal [[id number]]   Swab from Nasopharyngeal    In process Component Value   No component results                No results for input(s): NTBNPI, NTBNP in the last 168 hours.  No results for input(s): CKT in the last 168 hours.    Invalid input(s): CK, CK TOTAL  GFR Estimate   Date Value Ref Range Status   09/19/2022 68 >60 mL/min/1.73m2 Final     Comment:     Effective December 21, 2021 eGFRcr in adults is calculated using the 2021 CKD-EPI creatinine equation which includes age and gender (Bhavya et al., NEJM, DOI: 10.1056/YSUDem4107368)     No results found for: GFRESTBLACK  Recent Labs   Lab 09/19/22 1946   GLC 89     Recent Labs   Lab 09/19/22 1946   HGB 13.2*     No results for  input(s): AST, ALT, GGT, ALKPHOS, BILITOTAL, BILICONJ, BILIDIRECT, DEBORA in the last 168 hours.    Invalid input(s): BILIRUBININDIRECT    Recent Results (from the past 24 hour(s))   MRA Angiogram Neck w/o & w Contrast    Narrative    EXAM: MRA NECK (CAROTIDS) W/O and W CONTRAST, MRA BRAIN ([place] OF [place]) W/O CONTRAST, MR BRAIN W/O and W CONTRAST  LOCATION: [facility]  DATE/TIME: 9/19/2022 11:39 PM    INDICATION: dizziness, gait ataxia  COMPARISON: None.  CONTRAST: 10mL Gadavist  TECHNIQUE:   1) Routine multiplanar multisequence head MRI without and with intravenous contrast.  2) 3D time-of-flight head MRA without intravenous contrast.  3) Neck MRA without and with IV contrast. Stenosis measurements made according to NASCET criteria unless otherwise specified.    FINDINGS:  HEAD MRI:  INTRACRANIAL CONTENTS: Punctate focus of equivocal restricted diffusion in the deep right frontal white matter (DWI image 23). No mass, acute hemorrhage, or extra-axial fluid collections. Patchy nonspecific T2/FLAIR hyperintensities within the cerebral   white matter and arturo most consistent with mild to moderate chronic microvascular ischemic change. Chronic cortical infarct left parietal/occipital junction, left middle frontal gyrus and right middle frontal gyrus. Small chronic lacunar infarct left   cerebellum and right lentiform nucleus. Mild to moderate generalized cerebral atrophy. No hydrocephalus. Normal position of the cerebellar tonsils. No pathologic contrast enhancement.    SELLA: No abnormality accounting for technique.    OSSEOUS STRUCTURES/SOFT TISSUES: Normal marrow signal. The major intracranial vascular flow voids are maintained.     ORBITS: No abnormality accounting for technique.     SINUSES/MASTOIDS: Mild mucosal thickening scattered about the paranasal sinuses. Scattered fluid/membrane thickening in the left mastoid air cells. No apparent mass in the posterior nasopharynx or skull base.      HEAD MRA:   ANTERIOR CIRCULATION: No stenosis/occlusion, aneurysm, or high flow vascular malformation. Fetal origin of both posterior cerebral arteries from the anterior circulation. Azygos ADOLPH.    POSTERIOR CIRCULATION: No stenosis/occlusion, aneurysm, or high flow vascular malformation. Dominant left vertebral artery supplies the basilar artery with a small right vertebral artery supplying the right posterior inferior cerebellar artery (PICA).     NECK MRA:   RIGHT CAROTID: No measurable stenosis or dissection.    LEFT CAROTID: No measurable stenosis or dissection.    VERTEBRAL ARTERIES: No focal stenosis or dissection. Balanced vertebral arteries.    AORTIC ARCH: Bovine origin left common carotid artery. No significant stenosis at the origin of the great vessels.      Impression    IMPRESSION:  HEAD MRI:   1.  Equivocal punctate focus of ischemic lacunar infarction in the deep right frontal white matter.  2.  Age-related changes with multifocal areas of chronic infarction as described in detail above.    HEAD MRA:   1.  No aneurysm, high flow AVM or significant stenosis identified.  2.  Variant [place] of [place] anatomy as above.    NECK MRA:  1.  No measurable stenosis or dissection.   MR Brain w/o & w Contrast    Narrative    EXAM: MRA NECK (CAROTIDS) W/O and W CONTRAST, MRA BRAIN ([place] OF HELMS) W/O CONTRAST, MR BRAIN W/O and W CONTRAST  LOCATION: [facility]  DATE/TIME: 9/19/2022 11:39 PM    INDICATION: dizziness, gait ataxia  COMPARISON: None.  CONTRAST: 10mL Gadavist  TECHNIQUE:   1) Routine multiplanar multisequence head MRI without and with intravenous contrast.  2) 3D time-of-flight head MRA without intravenous contrast.  3) Neck MRA without and with IV contrast. Stenosis measurements made according to NASCET criteria unless otherwise specified.    FINDINGS:  HEAD MRI:  INTRACRANIAL CONTENTS: Punctate focus of equivocal restricted diffusion in the deep right frontal white  matter (DWI image 23). No mass, acute hemorrhage, or extra-axial fluid collections. Patchy nonspecific T2/FLAIR hyperintensities within the cerebral   white matter and arturo most consistent with mild to moderate chronic microvascular ischemic change. Chronic cortical infarct left parietal/occipital junction, left middle frontal gyrus and right middle frontal gyrus. Small chronic lacunar infarct left   cerebellum and right lentiform nucleus. Mild to moderate generalized cerebral atrophy. No hydrocephalus. Normal position of the cerebellar tonsils. No pathologic contrast enhancement.    SELLA: No abnormality accounting for technique.    OSSEOUS STRUCTURES/SOFT TISSUES: Normal marrow signal. The major intracranial vascular flow voids are maintained.     ORBITS: No abnormality accounting for technique.     SINUSES/MASTOIDS: Mild mucosal thickening scattered about the paranasal sinuses. Scattered fluid/membrane thickening in the left mastoid air cells. No apparent mass in the posterior nasopharynx or skull base.     HEAD MRA:   ANTERIOR CIRCULATION: No stenosis/occlusion, aneurysm, or high flow vascular malformation. Fetal origin of both posterior cerebral arteries from the anterior circulation. Azygos ADOLPH.    POSTERIOR CIRCULATION: No stenosis/occlusion, aneurysm, or high flow vascular malformation. Dominant left vertebral artery supplies the basilar artery with a small right vertebral artery supplying the right posterior inferior cerebellar artery (PICA).     NECK MRA:   RIGHT CAROTID: No measurable stenosis or dissection.    LEFT CAROTID: No measurable stenosis or dissection.    VERTEBRAL ARTERIES: No focal stenosis or dissection. Balanced vertebral arteries.    AORTIC ARCH: Bovine origin left common carotid artery. No significant stenosis at the origin of the great vessels.      Impression    IMPRESSION:  HEAD MRI:   1.  Equivocal punctate focus of ischemic lacunar infarction in the deep right frontal white  matter.  2.  Age-related changes with multifocal areas of chronic infarction as described in detail above.    HEAD MRA:   1.  No aneurysm, high flow AVM or significant stenosis identified.  2.  Variant [place] of [place] anatomy as above.    NECK MRA:  1.  No measurable stenosis or dissection.   MR Head w/o Contrast Angiogram    Narrative    EXAM: MRA NECK (CAROTIDS) W/O and W CONTRAST, MRA BRAIN ([place] OF HELMS) W/O CONTRAST, MR BRAIN W/O and W CONTRAST  LOCATION: [facility]  DATE/TIME: 9/19/2022 11:39 PM    INDICATION: dizziness, gait ataxia  COMPARISON: None.  CONTRAST: 10mL Gadavist  TECHNIQUE:   1) Routine multiplanar multisequence head MRI without and with intravenous contrast.  2) 3D time-of-flight head MRA without intravenous contrast.  3) Neck MRA without and with IV contrast. Stenosis measurements made according to NASCET criteria unless otherwise specified.    FINDINGS:  HEAD MRI:  INTRACRANIAL CONTENTS: Punctate focus of equivocal restricted diffusion in the deep right frontal white matter (DWI image 23). No mass, acute hemorrhage, or extra-axial fluid collections. Patchy nonspecific T2/FLAIR hyperintensities within the cerebral   white matter and arturo most consistent with mild to moderate chronic microvascular ischemic change. Chronic cortical infarct left parietal/occipital junction, left middle frontal gyrus and right middle frontal gyrus. Small chronic lacunar infarct left   cerebellum and right lentiform nucleus. Mild to moderate generalized cerebral atrophy. No hydrocephalus. Normal position of the cerebellar tonsils. No pathologic contrast enhancement.    SELLA: No abnormality accounting for technique.    OSSEOUS STRUCTURES/SOFT TISSUES: Normal marrow signal. The major intracranial vascular flow voids are maintained.     ORBITS: No abnormality accounting for technique.     SINUSES/MASTOIDS: Mild mucosal thickening scattered about the paranasal sinuses. Scattered  fluid/membrane thickening in the left mastoid air cells. No apparent mass in the posterior nasopharynx or skull base.     HEAD MRA:   ANTERIOR CIRCULATION: No stenosis/occlusion, aneurysm, or high flow vascular malformation. Fetal origin of both posterior cerebral arteries from the anterior circulation. Azygos ADOLPH.    POSTERIOR CIRCULATION: No stenosis/occlusion, aneurysm, or high flow vascular malformation. Dominant left vertebral artery supplies the basilar artery with a small right vertebral artery supplying the right posterior inferior cerebellar artery (PICA).     NECK MRA:   RIGHT CAROTID: No measurable stenosis or dissection.    LEFT CAROTID: No measurable stenosis or dissection.    VERTEBRAL ARTERIES: No focal stenosis or dissection. Balanced vertebral arteries.    AORTIC ARCH: Bovine origin left common carotid artery. No significant stenosis at the origin of the great vessels.      Impression    IMPRESSION:  HEAD MRI:   1.  Equivocal punctate focus of ischemic lacunar infarction in the deep right frontal white matter.  2.  Age-related changes with multifocal areas of chronic infarction as described in detail above.    HEAD MRA:   1.  No aneurysm, high flow AVM or significant stenosis identified.  2.  Variant [place] of [place] anatomy as above.    NECK MRA:  1.  No measurable stenosis or dissection.

## 2022-09-20 NOTE — ED NOTES
Rapid Assessment Note    History:   Benjamin Elizabeth is a 87 year old male who presents with dizziness and balance issues which waxes and wanes but has been continuous for three days. Patient was told to come to Ed after visiting Urgent car today. Patient denies falling. Patient denies chest pain, headache, numbness, weakness, or abdominal pain. Patient states he had vertigo years ago but states symptoms feel different than vertigo. Patient reports he has never had a stroke or atrial fibrillation. Patient's wife states the patient's speech has not changed.    Exam:   ***  General:  Alert, interactive  Cardiovascular:  Well perfused  Lungs:  No respiratory distress, no accessory muscle use  Neuro:  Moving all 4 extremities  Skin:  Warm, dry  Psych:  Normal affect    Plan of Care:   I evaluated the patient and developed an initial plan of care. I discussed this plan and explained that I, or one of my partners, would be returning to complete the evaluation.     I, Andreea Dooley, am serving as a scribe to document services personally performed by Lester Bowman MD based on my observations and the provider's statements to me.    09/19/2022  [employer]    Portions of this medical record were completed by a scribe. UPON MY REVIEW AND AUTHENTICATION BY ELECTRONIC SIGNATURE, this confirms (a) I performed the applicable clinical services, and (b) the record is accurate.

## 2022-09-20 NOTE — PROGRESS NOTES
HOSPITALIST FOLLOW UP NOTE:    The patient was seen and examined, I agree with the history, exam, assessment and plan of Dr Alves.    1. Acute ischemic infarct deep right frontal white matter: Unsteady on his feet. No worsening of sx. Neuro consult. Complete work up with TTE, telemetry. LDL 45, on lipitor 40. Page out to neuro to see if we will do anti PLT meds or DOAC as cardiology does not appreciate Afib.  2. Second degree AV block, type 1: Cardiology consulted. 2 wk Ziopatch on discharge. No indication for PM and no associated sx.    Gregory Chaudhary DO MPH  [facility] Hospitalist  [address]   [phone]  Pager: [phone]  09/20/2022

## 2022-09-20 NOTE — CONSULTS
[facility]    Stroke Consult Note    Reason for Consult:  stroke    Chief Complaint: Dizziness       HPI  Benjamin Elizabeth is a 87 year old male with PMH of HTN, HLD, migraine with aura and CAD. He presented to the [facility] ED 9/19/2022 for evaluation of gait instability and lightheadedness. At the time of presentation he reported 3 days of waxing/wanning dizziness and imbalance. He was found to be in new atrial fibrillation. Presenting BP was 121/93. MRI with punctate focus of possible infarct.    On tele-exam, he reiterates symptoms started on Friday morning and have been waxing and waning since.  Benjamin feels his symptoms are likely somewhat improved, but admits he has not been up and walking around yet today. He again reiterates this is different than the vertigo he experienced in the past. He reiterates that the diagnosis of atrial fibrillation is new to him.  He denies history of similar symptoms in the past or known personal history of TIA.       Stroke Evaluation Summarized    MRI/Head CT MRI: punctate focus of ischemia in deep right frontal matter   Intracranial Vasculature MRA: No LVO or significant stenosis   Cervical Vasculature MRA: No LVO, significant stenosis or dissection      Echocardiogram LVEF 55-60%, no regional wall motion abnormalities, no LV thrombus identified, mild-moderate biatrial enlargement    EKG/Telemetry Atrial fibrillation with slow ventricular response    Other Testing Not Applicable      LDL  9/19/2022: 45 mg/dL   A1C  9/19/2022: 5.6 %   Troponin 9/20/2022: 24 ng/L   [id number] score=4    Impression  1. Acute right frontal infarct; felt to be an asymptomatic/incidental finding. Suspect secondary to cardioembolism in the setting of newly discovered atrial fibrillation    2. Dizziness, impaired mobility. Etiology unclear, consider symptom of new onset atrial fibrillation vs non-vascular neurologic etiology vs other     Recommendations  -Initiate Eliquis  "5mg BID for secondary stroke prevention in the setting of atrial fibrillation; from stroke perspective patient does not require ASA for secondary prevention if on Eliquis  -Goal BP is <130/80 with tighter control associated with improved vascular outcomes  -LDL 45 (goal 40-70); continue Lipitor 40mg with ongoing outpatient titration to maintain LDL goal   -Blood glucose monitoring, Hgb A1c 5.6%, (goal <7% for secondary stroke prevention), follow-up with PCP  -Echocardiogram, pending  -consider general neurology consultation for further evaluation of dizziness/gait impairment if not felt to be explained by new onset atrial fibrillation  - PT/OT/SLP  - Stroke Education  - Euthermia, Euglycemia     Patient Follow-up    - in the next 1-2 week(s) with PCP  - in 6-8 weeks with general neurology ([phone])    Thank you for this consult. No further stroke workup is recommended, so we will sign off. Please contact us with any additional questions or concerns.     Alisa CASTANEDA, CNP  Vascular Neurology  To page me or covering stroke neurology team member, click here: [facility]   Choose \"On Call\" tab at top, then search dropdown box for \"Neurology Adult\", select location, press Enter, then look for stroke/neuro ICU/telestroke.  _____________________________________________________    Clinically Significant Risk Factors Present on Admission                   Past Medical History   No past medical history on file.  Past Surgical History   No past surgical history on file.  Medications   Home Meds  Prior to Admission medications    Not on File       Scheduled Meds    aspirin  81 mg Oral Daily    Or     aspirin  81 mg Oral or NG Tube Daily     famotidine  20 mg Oral BID    Or     famotidine  20 mg Per NG tube BID     sodium chloride (PF)  3 mL Intracatheter Q8H       Infusion Meds    - MEDICATION INSTRUCTIONS -         PRN Meds  lidocaine 4%, lidocaine (buffered or not buffered), - MEDICATION INSTRUCTIONS -, sodium chloride " (PF)    Allergies   No Known Allergies  Family History   No family history on file.  Social History        Review of Systems   The 10 point Review of Systems is negative other than noted in the HPI or here.        PHYSICAL EXAMINATION   Temp:  [97.3  F (36.3  C)-98  F (36.7  C)] 98  F (36.7  C)  Pulse:  [41-82] 82  Resp:  [18-20] 18  BP: (106-178)/(55-93) 154/60  SpO2:  [97 %-98 %] 98 %    General Exam  General:  patient lying in bed without any acute distress    HEENT:  normocephalic/atraumatic  Pulmonary:  no respiratory distress    Neuro Exam  Mental Status:  alert, oriented x 3, follows commands, speech clear and fluent, naming and repetition normal  Cranial Nerves:  visual fields intact (tested by nurse), EOMI with normal smooth pursuit, facial sensation intact and symmetric (tested by nurse), facial movements symmetric, hearing not formally tested but intact to conversation, no dysarthria, shoulder shrug equal bilaterally, tongue protrusion midline  Motor:  no abnormal movements, able to move all limbs antigravity spontaneously with no signs of hemiparesis observed, no pronator drift   Reflexes:  unable to test (telestroke)  Sensory:  light touch sensation intact and symmetric throughout upper and lower extremities (assessed by nurse), no extinction on double simultaneous stimulation (assessed by nurse)  Coordination:  normal finger-to-nose and heel-to-shin bilaterally without dysmetria, rapid alternating movements symmetric  Station/Gait:  unable to test due to telestroke    Dysphagia Screen  Per Nursing    Stroke Scales    NIHSS  1a. Level of Consciousness 0-->Alert, keenly responsive   1b. LOC Questions 0-->Answers both questions correctly   1c. LOC Commands 0-->Performs both tasks correctly   2.   Best Gaze 0-->Normal   3.   Visual 0-->No visual loss   4.   Facial Palsy 0-->Normal symmetrical movements   5a. Motor Arm, Left 0-->No drift, limb holds 90 (or 45) degrees for full 10 secs   5b. Motor Arm, Right  0-->No drift, limb holds 90 (or 45) degrees for full 10 secs   6a. Motor Leg, Left 0-->No drift, leg holds 30 degree position for full 5 secs   6b. Motor Leg, right 0-->No drift, leg holds 30 degree position for full 5 secs   7.   Limb Ataxia 0-->Absent   8.   Sensory 0-->Normal, no sensory loss   9.   Best Language 0-->No aphasia, normal   10. Dysarthria 0-->Normal   11. Extinction and Inattention  0-->No abnormality   Total 0 (09/20/22 1145)       Imaging  I personally reviewed all imaging; relevant findings per HPI.    Labs Data   CBC  Recent Labs   Lab 09/19/22 1946   WBC 5.9   RBC 4.27*   HGB 13.2*   HCT 42.1   *     Basic Metabolic Panel   Recent Labs   Lab 09/20/22  1153 09/19/22 1946   NA  --  141   POTASSIUM  --  4.6   CHLORIDE  --  104   CO2  --  27   BUN  --  27.8*   CR  --  1.07   GLC 84 89   ERASTO  --  9.2     Liver Panel  No results for input(s): PROTTOTAL, ALBUMIN, BILITOTAL, ALKPHOS, AST, ALT, BILIDIRECT in the last 168 hours.  INR  No lab results found.   Lipid Profile    Recent Labs   Lab Test 09/19/22 1946   CHOL 117   HDL 63   LDL 45   TRIG 46     A1C    Recent Labs   Lab Test 09/19/22 1946   A1C 5.6     Troponin    Recent Labs   Lab 09/20/22  0946 09/20/22  0633 09/19/22 1946   CTROPT 24* 26* 26*          Stroke Consult Data Data   Telestroke Service Details  (for non-emergent stroke consult with tele)  Video start time 09/20/22   1102   Video end time 09/20/22   1131   Type of service telemedicine diagnostic assessment of acute neurological changes   Reason telemedicine is appropriate patient requires assessment with a specialist for diagnosis and treatment of neurological symptoms   Mode of transmission secure interactive audio and video communication per Musa   Originating site (patient location) [facility]    Distant site (provider location) [facility]     I have personally spent a total of 45 minutes providing care  today, time spent in reviewing medical records and reviewing tests, examining the patient and obtaining history, coordination of care, and discussion with the patient and/or family regarding diagnostic results, prognosis, symptom management, risks and benefits of management options, and development of plan of care. Greater than 50% was spent in counseling and coordination of care.

## 2022-09-21 ENCOUNTER — HOSPITAL ENCOUNTER (INPATIENT)
Facility: CLINIC | Age: 87
LOS: 2 days | Discharge: HOME | DRG: 244 | End: 2022-09-23
Attending: INTERNAL MEDICINE | Admitting: INTERNAL MEDICINE
Payer: COMMERCIAL

## 2022-09-21 ENCOUNTER — APPOINTMENT (OUTPATIENT)
Dept: OCCUPATIONAL THERAPY | Facility: CLINIC | Age: 87
DRG: 066 | End: 2022-09-21
Payer: COMMERCIAL

## 2022-09-21 ENCOUNTER — APPOINTMENT (OUTPATIENT)
Dept: PHYSICAL THERAPY | Facility: CLINIC | Age: 87
DRG: 066 | End: 2022-09-21
Payer: COMMERCIAL

## 2022-09-21 VITALS
RESPIRATION RATE: 20 BRPM | SYSTOLIC BLOOD PRESSURE: 142 MMHG | DIASTOLIC BLOOD PRESSURE: 64 MMHG | HEART RATE: 42 BPM | WEIGHT: 197 LBS | OXYGEN SATURATION: 99 % | HEIGHT: 73 IN | TEMPERATURE: 97.3 F | BODY MASS INDEX: 26.11 KG/M2

## 2022-09-21 DIAGNOSIS — I44.1 SECOND DEGREE ATRIOVENTRICULAR BLOCK: Primary | ICD-10-CM

## 2022-09-21 LAB
ANION GAP SERPL CALCULATED.3IONS-SCNC: 9 MMOL/L (ref 7–15)
APTT PPP: 30 SECONDS (ref 22–38)
ATRIAL RATE - MUSE: 49 BPM
BUN SERPL-MCNC: 20.9 MG/DL (ref 8–23)
CALCIUM SERPL-MCNC: 8.6 MG/DL (ref 8.8–10.2)
CHLORIDE SERPL-SCNC: 107 MMOL/L (ref 98–107)
CREAT SERPL-MCNC: 1.03 MG/DL (ref 0.67–1.17)
DEPRECATED HCO3 PLAS-SCNC: 24 MMOL/L (ref 22–29)
DIASTOLIC BLOOD PRESSURE - MUSE: NORMAL MMHG
ERYTHROCYTE [DISTWIDTH] IN BLOOD BY AUTOMATED COUNT: 14.3 % (ref 10–15)
GFR SERPL CREATININE-BSD FRML MDRD: 71 ML/MIN/1.73M2
GLUCOSE SERPL-MCNC: 80 MG/DL (ref 70–99)
HCT VFR BLD AUTO: 36.7 % (ref 40–53)
HGB BLD-MCNC: 11.8 G/DL (ref 13.3–17.7)
INR PPP: 1.07 (ref 0.85–1.15)
INTERPRETATION ECG - MUSE: NORMAL
MAGNESIUM SERPL-MCNC: 1.9 MG/DL (ref 1.7–2.3)
MCH RBC QN AUTO: 31 PG (ref 26.5–33)
MCHC RBC AUTO-ENTMCNC: 32.2 G/DL (ref 31.5–36.5)
MCV RBC AUTO: 96 FL (ref 78–100)
P AXIS - MUSE: NORMAL DEGREES
PHOSPHATE SERPL-MCNC: 3 MG/DL (ref 2.5–4.5)
PLATELET # BLD AUTO: 118 10E3/UL (ref 150–450)
POTASSIUM SERPL-SCNC: 4.2 MMOL/L (ref 3.4–5.3)
PR INTERVAL - MUSE: 432 MS
QRS DURATION - MUSE: 124 MS
QT - MUSE: 522 MS
QTC - MUSE: 471 MS
R AXIS - MUSE: -46 DEGREES
RBC # BLD AUTO: 3.81 10E6/UL (ref 4.4–5.9)
SODIUM SERPL-SCNC: 140 MMOL/L (ref 136–145)
SYSTOLIC BLOOD PRESSURE - MUSE: NORMAL MMHG
T AXIS - MUSE: 32 DEGREES
VENTRICULAR RATE- MUSE: 49 BPM
WBC # BLD AUTO: 5.4 10E3/UL (ref 4–11)

## 2022-09-21 PROCEDURE — 83735 ASSAY OF MAGNESIUM: CPT | Performed by: INTERNAL MEDICINE

## 2022-09-21 PROCEDURE — 82435 ASSAY OF BLOOD CHLORIDE: CPT | Performed by: INTERNAL MEDICINE

## 2022-09-21 PROCEDURE — 250N000013 HC RX MED GY IP 250 OP 250 PS 637: Performed by: INTERNAL MEDICINE

## 2022-09-21 PROCEDURE — 210N000002 HC R&B HEART CARE

## 2022-09-21 PROCEDURE — 85018 HEMOGLOBIN: CPT | Performed by: INTERNAL MEDICINE

## 2022-09-21 PROCEDURE — 99207 PR APP CREDIT; MD BILLING SHARED VISIT: CPT | Performed by: INTERNAL MEDICINE

## 2022-09-21 PROCEDURE — 99207 PR NO BILLABLE SERVICE THIS VISIT: CPT | Performed by: STUDENT IN AN ORGANIZED HEALTH CARE EDUCATION/TRAINING PROGRAM

## 2022-09-21 PROCEDURE — 84100 ASSAY OF PHOSPHORUS: CPT | Performed by: INTERNAL MEDICINE

## 2022-09-21 PROCEDURE — 99223 1ST HOSP IP/OBS HIGH 75: CPT | Mod: AI | Performed by: NURSE PRACTITIONER

## 2022-09-21 PROCEDURE — 99239 HOSP IP/OBS DSCHRG MGMT >30: CPT | Performed by: INTERNAL MEDICINE

## 2022-09-21 PROCEDURE — 99233 SBSQ HOSP IP/OBS HIGH 50: CPT | Performed by: INTERNAL MEDICINE

## 2022-09-21 PROCEDURE — 85730 THROMBOPLASTIN TIME PARTIAL: CPT | Performed by: INTERNAL MEDICINE

## 2022-09-21 PROCEDURE — 97535 SELF CARE MNGMENT TRAINING: CPT | Mod: GO | Performed by: OCCUPATIONAL THERAPIST

## 2022-09-21 PROCEDURE — 97530 THERAPEUTIC ACTIVITIES: CPT | Mod: GO | Performed by: OCCUPATIONAL THERAPIST

## 2022-09-21 PROCEDURE — 36415 COLL VENOUS BLD VENIPUNCTURE: CPT | Performed by: INTERNAL MEDICINE

## 2022-09-21 PROCEDURE — 250N000013 HC RX MED GY IP 250 OP 250 PS 637: Performed by: NURSE PRACTITIONER

## 2022-09-21 PROCEDURE — 85610 PROTHROMBIN TIME: CPT | Performed by: INTERNAL MEDICINE

## 2022-09-21 PROCEDURE — 97530 THERAPEUTIC ACTIVITIES: CPT | Mod: GP | Performed by: PHYSICAL THERAPIST

## 2022-09-21 PROCEDURE — 97116 GAIT TRAINING THERAPY: CPT | Mod: GP | Performed by: PHYSICAL THERAPIST

## 2022-09-21 RX ORDER — ASPIRIN 81 MG/1
81 TABLET ORAL DAILY
Status: DISCONTINUED | OUTPATIENT
Start: 2022-09-22 | End: 2022-09-23 | Stop reason: HOSPADM

## 2022-09-21 RX ORDER — LIDOCAINE 40 MG/G
CREAM TOPICAL
Status: DISCONTINUED | OUTPATIENT
Start: 2022-09-21 | End: 2022-09-23 | Stop reason: HOSPADM

## 2022-09-21 RX ORDER — ACETAMINOPHEN 650 MG/1
650 SUPPOSITORY RECTAL EVERY 6 HOURS PRN
Status: DISCONTINUED | OUTPATIENT
Start: 2022-09-21 | End: 2022-09-22

## 2022-09-21 RX ORDER — ATORVASTATIN CALCIUM 40 MG/1
40 TABLET, FILM COATED ORAL EVERY EVENING
Status: DISCONTINUED | OUTPATIENT
Start: 2022-09-21 | End: 2022-09-23 | Stop reason: HOSPADM

## 2022-09-21 RX ORDER — ATORVASTATIN CALCIUM 40 MG/1
40 TABLET, FILM COATED ORAL EVERY EVENING
Status: DISCONTINUED | OUTPATIENT
Start: 2022-09-22 | End: 2022-09-21

## 2022-09-21 RX ORDER — AMOXICILLIN 250 MG
2 CAPSULE ORAL 2 TIMES DAILY PRN
Status: DISCONTINUED | OUTPATIENT
Start: 2022-09-21 | End: 2022-09-23 | Stop reason: HOSPADM

## 2022-09-21 RX ORDER — ACETAMINOPHEN 325 MG/1
650 TABLET ORAL EVERY 6 HOURS PRN
Status: DISCONTINUED | OUTPATIENT
Start: 2022-09-21 | End: 2022-09-22

## 2022-09-21 RX ORDER — AMOXICILLIN 250 MG
1 CAPSULE ORAL 2 TIMES DAILY PRN
Status: DISCONTINUED | OUTPATIENT
Start: 2022-09-21 | End: 2022-09-23 | Stop reason: HOSPADM

## 2022-09-21 RX ORDER — FAMOTIDINE 20 MG/1
20 TABLET, FILM COATED ORAL 2 TIMES DAILY
Status: DISCONTINUED | OUTPATIENT
Start: 2022-09-21 | End: 2022-09-23 | Stop reason: HOSPADM

## 2022-09-21 RX ADMIN — FAMOTIDINE 20 MG: 20 TABLET ORAL at 08:18

## 2022-09-21 RX ADMIN — ASPIRIN 81 MG: 81 TABLET, COATED ORAL at 08:18

## 2022-09-21 RX ADMIN — FAMOTIDINE 20 MG: 20 TABLET ORAL at 20:29

## 2022-09-21 RX ADMIN — ATORVASTATIN CALCIUM 40 MG: 40 TABLET, FILM COATED ORAL at 20:29

## 2022-09-21 ASSESSMENT — ACTIVITIES OF DAILY LIVING (ADL)
ADLS_ACUITY_SCORE: 24
ADLS_ACUITY_SCORE: 27
ADLS_ACUITY_SCORE: 28
EQUIPMENT_CURRENTLY_USED_AT_HOME: CANE, STRAIGHT
VISION_MANAGEMENT: GLASSES
ADLS_ACUITY_SCORE: 24
WEAR_GLASSES_OR_BLIND: YES
TRANSFERRING: 1-->ASSISTANCE (EQUIPMENT/PERSON) NEEDED (NOT DEVELOPMENTALLY APPROPRIATE)
ADLS_ACUITY_SCORE: 27
ADLS_ACUITY_SCORE: 27
DIFFICULTY_EATING/SWALLOWING: NO
ADLS_ACUITY_SCORE: 27
ADLS_ACUITY_SCORE: 25
TRANSFERRING: 1-->ASSISTANCE (EQUIPMENT/PERSON) NEEDED
ADLS_ACUITY_SCORE: 25
DRESSING/BATHING_DIFFICULTY: NO
CHANGE_IN_FUNCTIONAL_STATUS_SINCE_ONSET_OF_CURRENT_ILLNESS/INJURY: YES
ADLS_ACUITY_SCORE: 25
FALL_HISTORY_WITHIN_LAST_SIX_MONTHS: YES
DOING_ERRANDS_INDEPENDENTLY_DIFFICULTY: NO
WALKING_OR_CLIMBING_STAIRS: STAIR CLIMBING DIFFICULTY, REQUIRES EQUIPMENT
WALKING_OR_CLIMBING_STAIRS_DIFFICULTY: YES
CONCENTRATING,_REMEMBERING_OR_MAKING_DECISIONS_DIFFICULTY: NO
ADLS_ACUITY_SCORE: 27
NUMBER_OF_TIMES_PATIENT_HAS_FALLEN_WITHIN_LAST_SIX_MONTHS: 1
TOILETING_ISSUES: NO
ADLS_ACUITY_SCORE: 25

## 2022-09-21 NOTE — PLAN OF CARE
Vital signs stable, on RA. Alert and orientated x4, but forgetful. Denies pain. q4 neuros intact. Up with SBA with gait belt and walker or cane. Regular diet. 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 On Potassium, magnesium and phosphorus protocol. K: 4.2, M.9, P: 3.0 all scheduled for re-checks tomorrow morning. Pt was transferred to [facility] for pacemaker implantation. Pt's wife was planning on meeting pt once arrived to [facility]. Pt's belongings were removed from room and sent with pt. Pt left [facility] at 1742 via medial transport services.

## 2022-09-21 NOTE — PROGRESS NOTES
[place] Progress Note     Fernando Ziegler MD  09/21/2022         Interval History:      Ambulated yesterday in the hallway and according to patient felt irritated wobbly and dizzy but denies feeling like passing out.  Telemetry reviewed extensively.  Heart rate has been varying in 30s to 40s occasionally in 50s.  On telemetry there is evidence of Wenckebach type I second-degree block as well as type II second-degree AV block.  Echocardiogram showed mild dilated LV but overall normal LV systolic function.       Assessment and Plan:      1.  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  On telemetry there is also evidence of type II second-degree AV block.  Some mixed symptoms of dizziness and wobbliness.  Even with ambulation heart rate remained mostly in 40s.  He is not on any AV chasidy blocking agent.  There is clear-cut evidence of significant conduction system disease.  Discussed with electrophysiology at [facility].  Pacemaker implantation is very reasonable and recommended in this situation to prevent adverse effects like syncope.  Patient tells me that he is going to think about it.  I also briefly explained him to the procedure and potential risk involved.  If patient is agreeable I will recommend transfer to [facility] for pacemaker implantation.  2.  Admitted with symptoms of dizziness and wobbliness and gait and steadiness and was diagnosed with stroke with punctate lacunar infarcts.  3.  Very mild flat pattern troponin elevation.  Nonspecific.  Clinically this does not appear to be acute coronary syndrome.  No wall motion abnormalities on echocardiogram.  No acute ST-T changes on EKG.  No chest pain.  Coronary angiogram in 2019 showed mild coronary artery disease    Recommendations  Due to evidence of type II second-degree AV block and significant sinus bradycardia with no significant increase in heart rate with  "exercise and some concerning symptoms of intermittent dizziness although this is somewhat complicated by sensation of imbalance I recommend pacemaker implantation for patient's safety and prevent adverse effects.  This was also discussed with electrophysiology at [facility] who are in agreement.  The rational of pacemaker implantation and the procedure and potential risks were discussed with patient.  Patient tells me is good to think about it.    Addendum  Had a long discussion patient and his wife at bedside regarding the findings of AV block.  Mostly this is type I second-degree AV block but there is evidence of for type II second-degree AV block.  Discussed in detail with patient the rational of pacemaker implantation and also briefly discussed with patient the procedure and potential risk involved.  Patient still has some gait instability and I discussed with patient and his wife that pacemaker implantation will have no impact on gait instability as I suspect this is due to neurological process.  They understand the rational of pacemaker implantation is for safety and preventing syncope and adverse outcomes.  Patient is agreeable.  I have also updated [facility] EP(Dr Cain), appreciate his assistance.  I have also discussed with Dr. Knight appreciate his assistance in transfer to [facility] for pacemaker implantation.           Physical Exam:       , Blood pressure (!) 160/63, pulse 50, temperature 97.4  F (36.3  C), temperature source Oral, resp. rate 20, height 1.854 m (6' 1\"), weight 89.4 kg (197 lb), SpO2 93 %.  Vitals:    09/19/22 1925 09/20/22 0936 09/21/22 0610   Weight: 91.8 kg (202 lb 6.1 oz) 89.7 kg (197 lb 12.8 oz) 89.4 kg (197 lb)     Vital Signs with Ranges  Temp:  [97.4  F (36.3  C)-98  F (36.7  C)] 97.4  F (36.3  C)  Pulse:  [44-82] 50  Resp:  [17-20] 20  BP: (139-173)/(47-71) 160/63  SpO2:  [93 %-98 %] 93 %  I/O's Last 24 hours  I/O last 3 completed " shifts:  In: 0   Out: 1250 [Urine:1250]  General patient appears comfortable  Neck normal JVP  Cardiovascular system S1-S2 normal no murmur rub or gallop   Respiratory system clear to auscultation  Neurological alert, oriented             Medications:          aspirin  81 mg Oral Daily    Or     aspirin  81 mg Oral or NG Tube Daily     atorvastatin  40 mg Oral QPM     famotidine  20 mg Oral BID    Or     famotidine  20 mg Per NG tube BID     sodium chloride (PF)  3 mL Intracatheter Q8H     PRN Meds: lidocaine 4%, lidocaine (buffered or not buffered), - MEDICATION INSTRUCTIONS -, sodium chloride (PF)         Data:      All new lab and imaging data was reviewed.   Recent Labs   Lab Test 09/21/22 0642 09/19/22 1946   WBC 5.4 5.9   HGB 11.8* 13.2*   MCV 96 99   * 133*   INR 1.07  --       Recent Labs   Lab Test 09/21/22 0642 09/20/22  2117 09/20/22  1722 09/20/22  1153 09/19/22 1946     --   --   --  141   POTASSIUM 4.2  --   --   --  4.6   CHLORIDE 107  --   --   --  104   CO2 24  --   --   --  27   BUN 20.9  --   --   --  27.8*   CR 1.03  --   --   --  1.07   ANIONGAP 9  --   --   --  10   ERASTO 8.6*  --   --   --  9.2   GLC 80 147* 88   < > 89    < > = values in this interval not displayed.     No lab results found.    Invalid input(s): TROP, BIANKA Ziegler MD  9/21/2022  Pager:  [phone]

## 2022-09-21 NOTE — PLAN OF CARE
A/Ox4. Forgetful. Q 4 neuros, intact. A1 w/ walker/cane. L PIV SL. RA. Tele: 2^ AVB Type 1 w/ BBB. Low HR in 40's, asymptomatic. +2 edema BLE. Mg/K/Phos protocol, AM recheck. Cards/neuro following. Plan for 0900 stroke education today. Plan to discharge home w/ assist, PT/OT and [facility]. Continue POC.

## 2022-09-21 NOTE — H&P
[facility]    History and Physical  Hospitalist       Date of Admission:  9/21/2022    Assessment & Plan   Benjamin Elizabeth is a 87 year old male with PMH significant for HTN, HLD, chronic anemia and thrombocytopenia and BLE edema who was admitted to [facility] from 9/19-9/21 after presenting to the ED with 3-days of generalized weakness, lightheadedness and gait instability. Work-up in the ED included MRI that demonstrated a lacunar infarct of the right frontal lobe concerning for acute CVA. Neurology was consulted due to concerns for possible CVA. He was seen by Stroke Neurology. MRI's were reviewed with Neuro radiology. Neurology endorsing that the right frontal DWI abnormality seen on MRI is not felt to clearly represent stroke and is favored to be artifact, in addition to there being no clear clinical or radiographic evidence of stroke. No additional stroke work-up was recommended. Patient was resumed on ASA and statin.     In addition, his initial ECG showed Mobitz Type I with telemetry evidence revealing Mobitz Type II heart block. His heart rates have maintained in the 40's even with exertion. Normal TSH. Cardiology was consulted. Echocardiogram showed mild dilated LV but overall normal LV systolic function. 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 He is currently experiencing symptoms of dizziness and gait instability. He also was noted to have a very flat troponin elevation that is non-specific without signs of ischemia on ECG. There are no WMA's on echo with coronary angio in 2019 showing only mild CAD. Due to evidence of Type II Second Degree AV Block, it was recommended that the patient transfer to [facility] for EP consultation and PPM placement.       Second Degree Heart  "Block  Bradycardia  Dizziness  Elevated Troponin  Admitted to [facility] from 9/19-9/21. Symptoms believed to be 2/2 second degree heart block and not on chasidy blocking agents prior to admission. Heart rates 40's to 60's with dizziness and gait instability. Unclear if weakness, dizziness and gait instability are related to his heart block.   -9/19 Echo: Mildly dilated LV with normal systolic function, no WMA's, EF 55-60%. Normal RV size and function, mild-moderate biatrial enlargement with no evidence of atrial shunt, trace to mild tricuspid regurg and mild aortic root dilatation  -Mildly elevated flat troponin 26-->26-->23 without signs of ischemia on prior ECG's   -No WMA's on echo   -Angio 2019 with mild CAD   -No active CP   -Monitor  -Admit to [facility]  -Telemetry monitoring  -Pacing pads to be placed on bedside at all times  -EP/Cardiology consult for likely PPM; appreciate recommendations  -NO AV chasidy blocking agents  -BR with commode/bathroom privileges with assist only until PPM placed  -Monitor lytes and replete as needed        Right Frontal DWI Abnormality (Seen on MRI 9/19/2022) Likely Artifact   Gait Instability   -Seen by Neurology at [facility] per note review   -MRI images were reviewed with Neuro Radiology and the right frontal DWI abnormality is not felt to clearly represent stroke and favored to be artifact and not stroke (Per Neurology note from 9/21)  -No additional stroke work-up or treatment was recommended per Neurology  -Recommended continuation of PTA ASA and Lipitor   -PT/OT/ST   -Therapies consulted at [facility] had recommended OP PT   -Will need follow-up therapy evaluation s/p PPM  -Falls risk  -Observed by nursing with \"wobbly\" and unsteady gait during admission when patient was walking to his bed  -If overall weakness and ataxia do not improve with therapy and time, will need OP General Neurology evaluation for underlying neurological process      Hypertension  Hyperlipidemia  CAD  Coronary angio " "in 2019 showing mild CAD.  -PTA regimen: Losartan 37.5 mg and Atorvastatin 40 mg daily  -Resume statin/ASA  -Will resume losartan with hold parameters; would avoid hypotension so as to not exacerbate his overall symptoms of dizziness  -Orthostatic blood pressures   -Nursing to check and document   -If BP's low, consider adjusting anti-hypertensive meds  -BP on admit: 172/74 and had just been settled in bed   -Continue to monitor VS   -Will hold on adding PRN's due to above and attempting to avoid hypotension      Chronic Anemia   Chronic Thrombocytopenia  -Has followed with Heme/Onc OP with anemia noted in June 2016   -At that time, found to have B12 deficiency anemia that improved with    B12 replacement   -Thrombocytopenia thought 2/2 possible clumping  -Not currently on B12 PTA  -Will check B12 and ferritin levels  -Hgb 11.8 and was in the 13 range 2 days prior   -PLT count 118 today and was 133; currently on ASA and will resume  -Monitor labs; no obvious signs of bleeding      Clinically Significant Risk Factors Present on Admission                # Thrombocytopenia: Plts = 118 10e3/uL (Ref range: 150 - 450 10e3/uL) on admission, will monitor for bleeding     # Overweight: Estimated body mass index is 25.99 kg/m  as calculated from the following:    Height as of 9/20/22: 1.854 m (6' 1\").    Weight as of an earlier encounter on 9/21/22: 89.4 kg (197 lb).          DVT Prophylaxis: Pneumatic Compression Devices  Code Status: Full Code    This patient was discussed with Dr. Arambula of the Hospitalist Service who agrees with current plans as outlined above.    Disposition: Anticipate 2 or greater midnight stay for EP evaluation and likely PPM placement. Will need re-evaluation by therapies to determine care needs at discharge.     Mariely Luis NP  [facility]  Securely message with the Vocera Web Console (learn more here)  Text page via [employer] Paging/Directory       Primary Care Physician "   [facility]    Chief Complaint   Mobitz Type II Heart Block  Possible Acute CVA    History is obtained from the patient and review of the EMR.     History of Present Illness   Benjamin Elizabeth is a 87 year old male with PMH significant for HTN, HLD, chronic anemia and thrombocytopenia and BLE edema who was admitted to [facility] from 9/19-9/21 after presenting to the ED with 3-days of generalized weakness, lightheadedness and gait instability. Work-up in the ED included MRI that demonstrated a lacunar infarct of the right frontal lobe concerning for acute CVA. Neurology was consulted due to concerns for possible CVA. He was seen by Stroke Neurology. MRI's were reviewed with Neuro radiology. Neurology endorsing that the right frontal DWI abnormality seen on MRI is not felt to clearly represent stroke and is favored to be artifact, in addition to there being no clear clinical or radiographic evidence of stroke. No additional stroke work-up was recommended. Patient was resumed on ASA and statin.     In addition, his initial ECG showed Mobitz Type I with telemetry evidence revealing Mobitz Type II heart block. His heart rates have maintained in the 40's even with exertion. Cardiology was consulted. Echocardiogram showed mild dilated LV but overall normal LV systolic function. 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 He is currently experiencing symptoms of dizziness and gait instability. He also was noted to have a very flat troponin elevation that is non-specific without signs of ischemia on ECG. There are no WMA's on echo with coronary angio in 2019 showing only mild CAD. Due to evidence of Type II Second Degree AV Block, it was recommended that the patient transfer to [facility] for EP consultation and PPM placement.     Patient  "is resting in bed. He reports that his symptoms came on around Friday when he began to feel dizzy and lightheaded. He had also noted \"wobbliness\" and difficulty with balance. He denies falling. Denies any syncopal episodes. He does state that he has had a low heart rate in the \"40's and 50's\" before and that it had not \"caused me any problems\" before. He actively plays golf at home. He plays cards and is socially active. He denies any smoking. Social alcohol use. Denies any chest pain/pressure or SOB. He does endorse some transient chest pain with exertion that has happened occasionally, but not consistently. States that he was seen OP for this and that there were no new recommendations and that this was being monitored. Medications and code status reviewed. Denies further complaints.     PAST MEDICAL HISTORY  No past medical history on file.    PAST SURGICAL HISTORY  No past surgical history on file.    HOME MEDICATIONS  Prior to Admission medications    Medication Sig Last Dose Taking? Auth Provider Long Term End Date   aspirin 81 MG EC tablet Take 81 mg by mouth daily   Unknown, Entered By History     atorvastatin (LIPITOR) 40 MG tablet Take 40 mg by mouth daily   Unknown, Entered By History Yes    losartan (COZAAR) 25 MG tablet Take 37.5 mg by mouth daily   Unknown, Entered By History Yes    multivitamin, therapeutic (THERA-VIT) TABS tablet Take 1 tablet by mouth daily   Unknown, Entered By History         ALLERGIES  No Known Allergies    SOCIAL HISTORY       FAMILY HISTORY  family history is not on file.    REVIEW OF SYSTEMS  A 10 point ROS was negative other than the symptoms noted above in the HPI.    Physical Exam   Nursing Notes Reviewed.  There were no vitals taken for this visit.   General:  Appears stated age in no acute distress.  Skin:  Warm, dry. No rashes or lesions on exposed skin.  HEENT:  Normocephalic, atraumatic; EOMs grossly intact. Hard of hearing.   Neck:  Supple.  Chest:  Breath sounds CTA " and no increased work of breathing.  Cardiovascular:  RRR--bradycardia, no rub or murmur. Mild bilateral pedal edema.   Abdomen:  Soft, non-tender, non-distended.  Musculoskeletal:  Moves all four extremities.  Neurological:  CN 2-12 grossly intact. Alert and oriented. Strength 5/5 throughout. Sensation intact. Gait not assessed. No focal neuro deficits. Tongue midline and face symmetrical. Speech fluent.     Data   Data reviewed today:  I personally reviewed   Recent Labs   Lab 09/21/22  0642 09/20/22  2117 09/20/22  1722 09/20/22  1153 09/19/22  1946   WBC 5.4  --   --   --  5.9   HGB 11.8*  --   --   --  13.2*   MCV 96  --   --   --  99   *  --   --   --  133*   INR 1.07  --   --   --   --      --   --   --  141   POTASSIUM 4.2  --   --   --  4.6   CHLORIDE 107  --   --   --  104   CO2 24  --   --   --  27   BUN 20.9  --   --   --  27.8*   CR 1.03  --   --   --  1.07   ANIONGAP 9  --   --   --  10   ERASTO 8.6*  --   --   --  9.2   GLC 80 147* 88   < > 89    < > = values in this interval not displayed.       Imaging:  No results found for this or any previous visit (from the past 24 hour(s)).

## 2022-09-21 NOTE — DISCHARGE SUMMARY
[facility]  Discharge Summary  Name: Benjamin Elizabeth    MRN: [id number]  YOB: 1935    Age: 87 year old  Date of Discharge:  9/21/2022  Date of Admission: 9/19/2022  Primary Care Provider: Reina Linn  Discharge Physician:  Kev Knight MD  Discharging Service:  Hospitalist      Discharge Diagnoses:  Possible acute cva vs MRI artifact  Mobitz type II heart block  Generalized weakness  Ataxia  HTN  HLD  CAD  Troponin elevation  Chronic anemia  Chronic thrombocytopenia     Hospital Course:  Benjamin Elizabeth with history of CAD, HTN, and HLD who was admitted on 9/19/2022 after presenting with 3 days of generalized weakness, lightheadedness, and some gait instability.  Initial brain MRI showed a lacunar infarct of the right frontal lobe concerning for acute CVA.  Not entirely clear whether this is the etiology for his symptoms which seem to be improving.  Initial EKG showed Mobitz type I, but telemetry overnight as showed evidence of Mobitz type II per cardiology were consulted.  Recommend transferring to [facility] for pacemaker placement.  He continues to be bradycardic in the 40s even with exertion.  He has been seen by PT and OT for weakness and the possible CVA who recommend outpatient therapy.  He has been seen by stroke neurology and the MRIs have been reviewed with neuroradiology again who feel this may actually be artifact and does not represent acute CVA.  I spoke with Dr. Humphrey from hospital medicine at [facility] who is excepted the patient for transfer.  Cardiology here spoke with the EP at [facility] who agreed with pacemaker placement, patient and spouse agreeable to pacemaker placement as well.    Assessment/plan:  Possible acute CVA versus MRI artifact: Presenting with 3 days of acute on chronic generalized weakness along with some ataxia involving the legs and lightheadedness.  Brain MRI did show a lacunar infarct in the right frontal lobe  that could represent acute CVA.  He and his spouse do note that he has had generalized weakness for years that has been slowly progressive although it is acutely worsened over the last 3 days.  He is generally weak on exam, but does not have focal neurodeficits.  He was found to have Mobitz type II heart block on telemetry, but otherwise work-up has been fairly unremarkable other than the MRI finding.  Its not entirely clear if his 3 days of worsening are secondary to the acute CVA exacerbating underlying problem or not.  He does not appear to have any active infection.  -Stroke neurology consulted while here.  They reviewed the MRI again with neuroradiology feel this may actually just be MRI artifact and not actually represent an acute CVA  -Currently on aspirin 81 mg daily and atorvastatin 40 mg daily which should be continued  -Initial stroke neurology recommendation for anticoagulation as EKG was read as A. fib, however that was an incorrect computer read and he has not had any A. fib while here.  Additionally the brain MRI may actually be an artifact so they do not recommend anticoagulation and just to continue 81 mg aspirin  -PT/OT/SLP consulted.  Recommending outpatient PT  -If overall weakness and ataxia do not improve with therapy and time recommend outpatient general neurology evaluation to make sure there is no other underlying neurologic process    Mobitz type II heart block: Initial EKG showed likely Mobitz type I heart block.  Per cardiology overnight telemetry readings showed consistent bradycardia in the 40s and evidence for Mobitz type II heart block as well.  Unclear if related to his progressive generalized weakness and fatigue or his initial lightheadedness.  -Cardiology consulted and recommend transfer to [facility] for pacemaker placement.  EP aware of the patient and agree  -Telemetry  -No AV chasidy blockade    HTN: PTA on losartan 37.5 mg daily which has been held for now to allow  "permissive hypertension.    HLD: Resumed atorvastatin 40 mg daily.  LDL 45 on this dose.    Chronic anemia: Hemoglobin chronically mildly low in the 13 range.  Similar levels initially here.    Chronic thrombocytopenia: He is on aspirin.  No other clear etiology, but present for years per care everywhere.    Lower extremity edema: Reports chronic lower extremity swelling.  TTE did not show any significant CHF.  -Recommend patient start using PEACE hose     Discharge Disposition:  Transferred to [facility]     Allergies:  No Known Allergies     Discharge Medications:   Current Discharge Medication List      CONTINUE these medications which have NOT CHANGED    Details   aspirin 81 MG EC tablet Take 81 mg by mouth daily      atorvastatin (LIPITOR) 40 MG tablet Take 40 mg by mouth daily      losartan (COZAAR) 25 MG tablet Take 37.5 mg by mouth daily      multivitamin, therapeutic (THERA-VIT) TABS tablet Take 1 tablet by mouth daily              Condition on Discharge:  Discharge condition: Stable   Discharge vitals: Blood pressure (!) 143/48, pulse (!) 44, temperature 97.5  F (36.4  C), temperature source Oral, resp. rate 16, height 1.854 m (6' 1\"), weight 89.4 kg (197 lb), SpO2 98 %.   Code status on discharge: Full Code     History of Illness:  See detailed admission note for full details.    Physical Exam:  Blood pressure (!) 143/48, pulse (!) 44, temperature 97.5  F (36.4  C), temperature source Oral, resp. rate 16, height 1.854 m (6' 1\"), weight 89.4 kg (197 lb), SpO2 98 %.  Wt Readings from Last 1 Encounters:   09/21/22 89.4 kg (197 lb)     Constitutional: Awake, NAD  Eyes: sclera white  HEENT: atraumatic, MMM  Respiratory: no respiratory distress, lungs cta bilaterally, no crackles or wheeze  Cardiovascular: Irregularly, irregular rhythm, no murmur  GI: non-tender, not distended, bowel sounds present  Skin: no rash or lesions, acyanotic  Musculoskeletal/extremities: atraumatic, no major deformities.  1+ " lower extremity edema  Neurologic: A&O, speech clear, strength 5 out of 5 in all extremities and light touch sensation intact  Psychiatric: calm, cooperative, normal affect    Procedures other than Imaging:  None     Imaging:  Results for orders placed or performed during the hospital encounter of 09/19/22   MR Brain w/o & w Contrast    Narrative    EXAM: MRA NECK (CAROTIDS) W/O and W CONTRAST, MRA BRAIN ([place] OF CABRERA) W/O CONTRAST, MR BRAIN W/O and W CONTRAST  LOCATION: [facility]  DATE/TIME: 9/19/2022 11:39 PM    INDICATION: dizziness, gait ataxia  COMPARISON: None.  CONTRAST: 10mL Gadavist  TECHNIQUE:   1) Routine multiplanar multisequence head MRI without and with intravenous contrast.  2) 3D time-of-flight head MRA without intravenous contrast.  3) Neck MRA without and with IV contrast. Stenosis measurements made according to NASCET criteria unless otherwise specified.    FINDINGS:  HEAD MRI:  INTRACRANIAL CONTENTS: Punctate focus of equivocal restricted diffusion in the deep right frontal white matter (DWI image 23). No mass, acute hemorrhage, or extra-axial fluid collections. Patchy nonspecific T2/FLAIR hyperintensities within the cerebral   white matter and arturo most consistent with mild to moderate chronic microvascular ischemic change. Chronic cortical infarct left parietal/occipital junction, left middle frontal gyrus and right middle frontal gyrus. Small chronic lacunar infarct left   cerebellum and right lentiform nucleus. Mild to moderate generalized cerebral atrophy. No hydrocephalus. Normal position of the cerebellar tonsils. No pathologic contrast enhancement.    SELLA: No abnormality accounting for technique.    OSSEOUS STRUCTURES/SOFT TISSUES: Normal marrow signal. The major intracranial vascular flow voids are maintained.     ORBITS: No abnormality accounting for technique.     SINUSES/MASTOIDS: Mild mucosal thickening scattered about the paranasal sinuses. Scattered  fluid/membrane thickening in the left mastoid air cells. No apparent mass in the posterior nasopharynx or skull base.     HEAD MRA:   ANTERIOR CIRCULATION: No stenosis/occlusion, aneurysm, or high flow vascular malformation. Fetal origin of both posterior cerebral arteries from the anterior circulation. Azygos ADOLPH.    POSTERIOR CIRCULATION: No stenosis/occlusion, aneurysm, or high flow vascular malformation. Dominant left vertebral artery supplies the basilar artery with a small right vertebral artery supplying the right posterior inferior cerebellar artery (PICA).     NECK MRA:   RIGHT CAROTID: No measurable stenosis or dissection.    LEFT CAROTID: No measurable stenosis or dissection.    VERTEBRAL ARTERIES: No focal stenosis or dissection. Balanced vertebral arteries.    AORTIC ARCH: Bovine origin left common carotid artery. No significant stenosis at the origin of the great vessels.      Impression    IMPRESSION:  HEAD MRI:   1.  Equivocal punctate focus of ischemic lacunar infarction in the deep right frontal white matter.  2.  Age-related changes with multifocal areas of chronic infarction as described in detail above.    HEAD MRA:   1.  No aneurysm, high flow AVM or significant stenosis identified.  2.  Variant [place] of [place]ms anatomy as above.    NECK MRA:  1.  No measurable stenosis or dissection.   MR Head w/o Contrast Angiogram    Narrative    EXAM: MRA NECK (CAROTIDS) W/O and W CONTRAST, MRA BRAIN ([place] OF HELMS) W/O CONTRAST, MR BRAIN W/O and W CONTRAST  LOCATION: [facility]  DATE/TIME: 9/19/2022 11:39 PM    INDICATION: dizziness, gait ataxia  COMPARISON: None.  CONTRAST: 10mL Gadavist  TECHNIQUE:   1) Routine multiplanar multisequence head MRI without and with intravenous contrast.  2) 3D time-of-flight head MRA without intravenous contrast.  3) Neck MRA without and with IV contrast. Stenosis measurements made according to NASCET criteria unless otherwise  specified.    FINDINGS:  HEAD MRI:  INTRACRANIAL CONTENTS: Punctate focus of equivocal restricted diffusion in the deep right frontal white matter (DWI image 23). No mass, acute hemorrhage, or extra-axial fluid collections. Patchy nonspecific T2/FLAIR hyperintensities within the cerebral   white matter and arturo most consistent with mild to moderate chronic microvascular ischemic change. Chronic cortical infarct left parietal/occipital junction, left middle frontal gyrus and right middle frontal gyrus. Small chronic lacunar infarct left   cerebellum and right lentiform nucleus. Mild to moderate generalized cerebral atrophy. No hydrocephalus. Normal position of the cerebellar tonsils. No pathologic contrast enhancement.    SELLA: No abnormality accounting for technique.    OSSEOUS STRUCTURES/SOFT TISSUES: Normal marrow signal. The major intracranial vascular flow voids are maintained.     ORBITS: No abnormality accounting for technique.     SINUSES/MASTOIDS: Mild mucosal thickening scattered about the paranasal sinuses. Scattered fluid/membrane thickening in the left mastoid air cells. No apparent mass in the posterior nasopharynx or skull base.     HEAD MRA:   ANTERIOR CIRCULATION: No stenosis/occlusion, aneurysm, or high flow vascular malformation. Fetal origin of both posterior cerebral arteries from the anterior circulation. Azygos ADOLPH.    POSTERIOR CIRCULATION: No stenosis/occlusion, aneurysm, or high flow vascular malformation. Dominant left vertebral artery supplies the basilar artery with a small right vertebral artery supplying the right posterior inferior cerebellar artery (PICA).     NECK MRA:   RIGHT CAROTID: No measurable stenosis or dissection.    LEFT CAROTID: No measurable stenosis or dissection.    VERTEBRAL ARTERIES: No focal stenosis or dissection. Balanced vertebral arteries.    AORTIC ARCH: Bovine origin left common carotid artery. No significant stenosis at the origin of the great vessels.       Impression    IMPRESSION:  HEAD MRI:   1.  Equivocal punctate focus of ischemic lacunar infarction in the deep right frontal white matter.  2.  Age-related changes with multifocal areas of chronic infarction as described in detail above.    HEAD MRA:   1.  No aneurysm, high flow AVM or significant stenosis identified.  2.  Variant [place] of [place]ms anatomy as above.    NECK MRA:  1.  No measurable stenosis or dissection.   MRA Angiogram Neck w/o & w Contrast    Narrative    EXAM: MRA NECK (CAROTIDS) W/O and W CONTRAST, MRA BRAIN ([place] OF HELMS) W/O CONTRAST, MR BRAIN W/O and W CONTRAST  LOCATION: [facility]  DATE/TIME: 9/19/2022 11:39 PM    INDICATION: dizziness, gait ataxia  COMPARISON: None.  CONTRAST: 10mL Gadavist  TECHNIQUE:   1) Routine multiplanar multisequence head MRI without and with intravenous contrast.  2) 3D time-of-flight head MRA without intravenous contrast.  3) Neck MRA without and with IV contrast. Stenosis measurements made according to NASCET criteria unless otherwise specified.    FINDINGS:  HEAD MRI:  INTRACRANIAL CONTENTS: Punctate focus of equivocal restricted diffusion in the deep right frontal white matter (DWI image 23). No mass, acute hemorrhage, or extra-axial fluid collections. Patchy nonspecific T2/FLAIR hyperintensities within the cerebral   white matter and arturo most consistent with mild to moderate chronic microvascular ischemic change. Chronic cortical infarct left parietal/occipital junction, left middle frontal gyrus and right middle frontal gyrus. Small chronic lacunar infarct left   cerebellum and right lentiform nucleus. Mild to moderate generalized cerebral atrophy. No hydrocephalus. Normal position of the cerebellar tonsils. No pathologic contrast enhancement.    SELLA: No abnormality accounting for technique.    OSSEOUS STRUCTURES/SOFT TISSUES: Normal marrow signal. The major intracranial vascular flow voids are maintained.     ORBITS: No  abnormality accounting for technique.     SINUSES/MASTOIDS: Mild mucosal thickening scattered about the paranasal sinuses. Scattered fluid/membrane thickening in the left mastoid air cells. No apparent mass in the posterior nasopharynx or skull base.     HEAD MRA:   ANTERIOR CIRCULATION: No stenosis/occlusion, aneurysm, or high flow vascular malformation. Fetal origin of both posterior cerebral arteries from the anterior circulation. Azygos ADOLPH.    POSTERIOR CIRCULATION: No stenosis/occlusion, aneurysm, or high flow vascular malformation. Dominant left vertebral artery supplies the basilar artery with a small right vertebral artery supplying the right posterior inferior cerebellar artery (PICA).     NECK MRA:   RIGHT CAROTID: No measurable stenosis or dissection.    LEFT CAROTID: No measurable stenosis or dissection.    VERTEBRAL ARTERIES: No focal stenosis or dissection. Balanced vertebral arteries.    AORTIC ARCH: Bovine origin left common carotid artery. No significant stenosis at the origin of the great vessels.      Impression    IMPRESSION:  HEAD MRI:   1.  Equivocal punctate focus of ischemic lacunar infarction in the deep right frontal white matter.  2.  Age-related changes with multifocal areas of chronic infarction as described in detail above.    HEAD MRA:   1.  No aneurysm, high flow AVM or significant stenosis identified.  2.  Variant [place] of Helms anatomy as above.    NECK MRA:  1.  No measurable stenosis or dissection.   Echocardiogram Complete w Bubble study - For age 60 yrs or less     Value    LVEF  55-60%    Narrative    [id number]  [id number]  [id number]  [id number]^JACQUELINE^MI^AZROCIO     [facility]  Echocardiography Laboratory  [address] [place] [address]     Name: VAISHALI RAMON  MRN: [id number]  : 1935  Study Date: 2022 01:56 PM  Age: 87 yrs  Gender: Male  Patient Location: [facility]  Reason For Study: Cerebrovascular Incident  Ordering Physician:  MI PHILLIPS  Referring Physician: [facility], [facility]  Performed By: Kristen Shelby RDCS     BSA: 2.1 m2  Height: 73 in  Weight: 197 lb  HR: 55  BP: 178/77 mmHg  ______________________________________________________________________________  Procedure  Complete Portable Echo Adult. Optison (NDC #[id number]) given intravenously.  ______________________________________________________________________________  Interpretation Summary     1. The left ventricle is mildly dilated. There is normal left ventricular wall  thickness. Left ventricular systolic function is normal. The visual ejection  fraction is 55-60%. No regional wall motion abnormalities noted. There is no  thrombus seen in the left ventricle.  2. The right ventricle is normal size. The right ventricular systolic function  is normal.  3. There is mild-moderate biatrial enlargement. There is no color Doppler  evidence of an atrial shunt.  4. Trace to mild mitral and tricuspid regurgitation.  5. Mild aortic root dilatation. 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  6. No pericardial effusion.  7. No previous study for comparison.  ______________________________________________________________________________  Left Ventricle  The left ventricle is mildly dilated. There is normal left ventricular wall  thickness. Left ventricular systolic function is normal. The visual ejection  fraction is 55-60%. No regional wall motion abnormalities noted. There is no  thrombus seen in the left ventricle.     Right Ventricle  The right ventricle is normal size. The right ventricular systolic function is  normal.     Atria  There is mild-moderate biatrial enlargement. There is no color Doppler  evidence of an atrial shunt.     Mitral Valve  There is mild (1+) mitral regurgitation.     Tricuspid Valve  There is trace tricuspid regurgitation. The right ventricular systolic  pressure is approximated at 28.9 mmHg plus the right atrial pressure.     Aortic  Valve  There is mild trileaflet aortic sclerosis. There is mild (1+) aortic  regurgitation. No aortic stenosis is present.     Pulmonic Valve  There is no pulmonic valvular stenosis.     Vessels  Mild aortic root dilatation. 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  Descending aortic velocity normal. The inferior vena cava is normal.     Pericardium  There is no pericardial effusion.     Rhythm  The rhythm was sinus bradycardia.  ______________________________________________________________________________  MMode/2D Measurements & Calculations  IVSd: 1.1 cm     LVIDd: 6.2 cm  LVIDs: 4.2 cm  LVPWd: 1.1 cm  FS: 32.2 %  LV mass(C)d: 285.1 grams  LV mass(C)dI: 133.4 grams/m2  Ao root diam: 4.2 cm  LA dimension: 4.2 cm  asc Aorta Diam: 4.5 cm  LA/Ao: 1.0  LVOT diam: 2.3 cm  LVOT area: 4.2 cm2  LA Volume (BP): 89.6 ml  LA Volume Index (BP): 41.9 ml/m2  RWT: 0.35     Doppler Measurements & Calculations  Ao V2 max: 148.0 cm/sec  Ao max P.0 mmHg  AI P1/2t: 674.5 msec  PA acc time: 0.08 sec  TR max fabián: 269.0 cm/sec  TR max P.9 mmHg     ______________________________________________________________________________  Report approved by: Kimi Aquino 2022 03:30 PM                Consultations:  Consultation during this admission received from cardiology and stroke neurology.       Recent Lab Results:  Recent Labs   Lab 22  0642 22  1946   WBC 5.4 5.9   HGB 11.8* 13.2*   HCT 36.7* 42.1   MCV 96 99   * 133*     Recent Labs   Lab 22  0642 22  2117 22  1722 22  1153 22     --   --   --  141   POTASSIUM 4.2  --   --   --  4.6   CHLORIDE 107  --   --   --  104   CO2 24  --   --   --  27   ANIONGAP 9  --   --   --  10   GLC 80 147* 88   < > 89   BUN 20.9  --   --   --  27.8*   CR 1.03  --   --   --  1.07   GFRESTIMATED 71  --   --   --  68   ERASTO 8.6*  --   --   --  9.2    < > = values in this interval not displayed.     Recent Labs   Lab 22    CHOL 117   HDL 63   LDL 45   TRIG 46          Pending Results:    Unresulted Labs Ordered in the Past 30 Days of this Admission     No orders found from 8/20/2022 to 9/20/2022.         These results will be followed up by patient's primary care provider.    Discharge Instructions and Follow-Up:   Discharge Procedure Orders   Physical Therapy Referral   Standing Status: Future   Referral Priority: Routine: Next available opening Referral Type: Rehab Therapy Physical Therapy   Number of Visits Requested: 1     Stroke Hospital Follow Up   Standing Status: Future Standing Exp. Date: 09/20/23   Order Comments: [employer] will call you to coordinate care as prescribed by your provider. If you don t hear from a representative within 2 business days, please call [phone].       Order Specific Question Answer Comments   Schedule Patient With: General Neurology    Specific Diagnosis: incidental stroke in setting of new onset afib    Contact: Patient    Scheduling Instructions: [employer] will call you to coordinate care as prescribed by your provider. If you don t hear from a representative within 2 business days, please call [phone].          IKev MD, personally saw the patient today and spent greater than 30 minutes discharging this patient.    Kev Knight MD

## 2022-09-21 NOTE — PHARMACY-ADMISSION MEDICATION HISTORY
Admission medication history completed at [facility]. Please see Pharmacy - Admission Medication History note from 09/20/2022.

## 2022-09-21 NOTE — CONSULTS
Stroke Education Note    The following information has been reviewed with the patient and family:    1. Warning signs of stroke    2. Calling 911 if having warning signs of stroke    3. All modifiable risk factors: hypertension, CAD, atrial fib, diabetes, hypercholesterolemia, smoking, substance abuse, diet, physical inactivity, obesity, sleep apnea.    4. Patient's risk factors for stroke which include: hypertension, CAD, hypercholesterolemia    5. Follow-up plan for after discharge    6. Discharge medications which include: aspirin, Cozaar, Lipitor    In addition, the above information was given to the patient and family in writing as a part of the [facility] Stroke Class Handout.    Learner's response to risk factors / lifestyle modification education: Taking steps     Altagracia Ram RN

## 2022-09-21 NOTE — PHARMACY
Pharmacy Consult to evaluate for medication related stroke core measures    Benjamin VASQUEZ Glenn, 87 year old male admitted for acute CVA on 9/19/2022.    Thrombolytic was not given because of Time from onset contraindications    VTE Prophylaxis: SCDs/PCDs ordered by end of Day 2.    Antithrombotic: aspirin started on 9/20/2022, as appropriate by end of hospital Day 2. Continue antithrombotic therapy on discharge to meet quality measures, unless contraindicated.    Anticoagulation if history of A-fib/flutter: Patient does not have history of A-fib/flutter - anticoagulation not required for medication related stroke core measures.     LDL Cholesterol Calculated   Date Value Ref Range Status   09/19/2022 45 <=100 mg/dL Final       Patient's home statin, Lipitor (atorvastatin) restarted; continue statin on discharge to meet quality measures, unless contraindicated.     Recommendations: None at this time    Thank you for the consult.    Marlen Valadez [facility] 9/21/2022 1:45 PM

## 2022-09-21 NOTE — PLAN OF CARE
Goal Outcome Evaluation: Ongoing, progressing.    Plan of Care reviewed with: Patient    Pertinent Assessments: VSS. A/Ox4. Neuros unchanged. Tele 2nd AV block type 1 with BBB and prolonged QT with rates 40s-50s. Denies symptoms r/t heart block. BLE edema. Voids via BR. Up with 1a gb/w.   Major Shift Events: Stroke education completed.   Treatment Plan: K/Mg/Phos protocols - AM draws. Cards following - recommending pacemaker, requires tx to [facility] for placement. Neuro following. PT/OT.

## 2022-09-21 NOTE — CONSULTS
"Brief stroke note:  Call from hospitalist to report that initial EKG strip was interpreted as atrial fibrillation, however on further review there has been NO evidence of atrial fibrillation this admission. He has no prior history of atrial fibrillation. Of note, he has been trending towards heart block with plans to transfer to [facility] for pacemaker placement.    In addition, MRI images were reviewed with neuroradiology and the right frontal DWI abnormality is not felt to clearly represent stroke, favored to be artifact. As such, with no clear clinical or radiographic evidence of stroke, this diagnosis is being removed in favor of the DWI abnormality being artifact. No additional stroke workup or treatment is required at this time, and he can just continue PTA ASA and Lipitor.     Impression:  1.Right frontal DWI abnormality; initially felt to represent incidental infarct but on further review felt to be artifact not stroke    Recommendations:  -as no clear evidence of atrial fibrillation, no current indication for anticoagulation/Eliquis  -as no clear evidence of infarct, no current indication for antiplatelet for secondary prevention; continue PTA ASA per other indications  -regular interrogation of pacemaker to assess for development of atrial fibrillation     Alisa CASTANEDA, CNP  Vascular Neurology  To page me or covering stroke neurology team member, click here: [facility]   Choose \"On Call\" tab at top, then search dropdown box for \"Neurology Adult\", select location, press Enter, then look for stroke/neuro ICU/telestroke.  "

## 2022-09-22 ENCOUNTER — TRANSFERRED RECORDS (OUTPATIENT)
Dept: HEALTH INFORMATION MANAGEMENT | Facility: CLINIC | Age: 87
End: 2022-09-22

## 2022-09-22 LAB
ABO/RH(D): NORMAL
ANION GAP SERPL CALCULATED.3IONS-SCNC: 5 MMOL/L (ref 3–14)
ANION GAP SERPL CALCULATED.3IONS-SCNC: 5 MMOL/L (ref 3–14)
ANTIBODY SCREEN: NEGATIVE
BASOPHILS # BLD AUTO: 0 10E3/UL (ref 0–0.2)
BASOPHILS NFR BLD AUTO: 0 %
BUN SERPL-MCNC: 21 MG/DL (ref 7–30)
BUN SERPL-MCNC: 22 MG/DL (ref 7–30)
CALCIUM SERPL-MCNC: 8.2 MG/DL (ref 8.5–10.1)
CALCIUM SERPL-MCNC: 8.2 MG/DL (ref 8.5–10.1)
CHLORIDE BLD-SCNC: 110 MMOL/L (ref 94–109)
CHLORIDE BLD-SCNC: 113 MMOL/L (ref 94–109)
CO2 SERPL-SCNC: 24 MMOL/L (ref 20–32)
CO2 SERPL-SCNC: 25 MMOL/L (ref 20–32)
CREAT SERPL-MCNC: 0.94 MG/DL (ref 0.66–1.25)
CREAT SERPL-MCNC: 0.95 MG/DL (ref 0.66–1.25)
EOSINOPHIL # BLD AUTO: 0.1 10E3/UL (ref 0–0.7)
EOSINOPHIL NFR BLD AUTO: 2 %
ERYTHROCYTE [DISTWIDTH] IN BLOOD BY AUTOMATED COUNT: 14.2 % (ref 10–15)
FERRITIN SERPL-MCNC: 70 NG/ML (ref 26–388)
FOLATE SERPL-MCNC: 17 NG/ML (ref 4.6–34.8)
GFR SERPL CREATININE-BSD FRML MDRD: 78 ML/MIN/1.73M2
GFR SERPL CREATININE-BSD FRML MDRD: 79 ML/MIN/1.73M2
GLUCOSE BLD-MCNC: 113 MG/DL (ref 70–99)
GLUCOSE BLD-MCNC: 86 MG/DL (ref 70–99)
HCT VFR BLD AUTO: 37 % (ref 40–53)
HGB BLD-MCNC: 11.7 G/DL (ref 13.3–17.7)
HGB BLD-MCNC: 11.9 G/DL (ref 13.3–17.7)
IMM GRANULOCYTES # BLD: 0 10E3/UL
IMM GRANULOCYTES NFR BLD: 0 %
LYMPHOCYTES # BLD AUTO: 1.4 10E3/UL (ref 0.8–5.3)
LYMPHOCYTES NFR BLD AUTO: 20 %
MAGNESIUM SERPL-MCNC: 2 MG/DL (ref 1.6–2.3)
MAGNESIUM SERPL-MCNC: 2.1 MG/DL (ref 1.6–2.3)
MCH RBC QN AUTO: 30.6 PG (ref 26.5–33)
MCHC RBC AUTO-ENTMCNC: 31.6 G/DL (ref 31.5–36.5)
MCV RBC AUTO: 97 FL (ref 78–100)
MONOCYTES # BLD AUTO: 0.6 10E3/UL (ref 0–1.3)
MONOCYTES NFR BLD AUTO: 9 %
NEUTROPHILS # BLD AUTO: 4.6 10E3/UL (ref 1.6–8.3)
NEUTROPHILS NFR BLD AUTO: 69 %
NRBC # BLD AUTO: 0 10E3/UL
NRBC BLD AUTO-RTO: 0 /100
PHOSPHATE SERPL-MCNC: 3.2 MG/DL (ref 2.5–4.5)
PLATELET # BLD AUTO: 107 10E3/UL (ref 150–450)
POTASSIUM BLD-SCNC: 3.9 MMOL/L (ref 3.4–5.3)
POTASSIUM BLD-SCNC: 4 MMOL/L (ref 3.4–5.3)
RBC # BLD AUTO: 3.82 10E6/UL (ref 4.4–5.9)
SODIUM SERPL-SCNC: 139 MMOL/L (ref 133–144)
SODIUM SERPL-SCNC: 143 MMOL/L (ref 133–144)
SPECIMEN EXPIRATION DATE: NORMAL
VIT B12 SERPL-MCNC: 166 PG/ML (ref 232–1245)
WBC # BLD AUTO: 6.6 10E3/UL (ref 4–11)

## 2022-09-22 PROCEDURE — 80048 BASIC METABOLIC PNL TOTAL CA: CPT | Performed by: NURSE PRACTITIONER

## 2022-09-22 PROCEDURE — C1894 INTRO/SHEATH, NON-LASER: HCPCS | Performed by: INTERNAL MEDICINE

## 2022-09-22 PROCEDURE — 99153 MOD SED SAME PHYS/QHP EA: CPT | Performed by: INTERNAL MEDICINE

## 2022-09-22 PROCEDURE — 93010 ELECTROCARDIOGRAM REPORT: CPT | Mod: XU | Performed by: INTERNAL MEDICINE

## 2022-09-22 PROCEDURE — 02HK3JZ INSERTION OF PACEMAKER LEAD INTO RIGHT VENTRICLE, PERCUTANEOUS APPROACH: ICD-10-PCS | Performed by: INTERNAL MEDICINE

## 2022-09-22 PROCEDURE — 02H63JZ INSERTION OF PACEMAKER LEAD INTO RIGHT ATRIUM, PERCUTANEOUS APPROACH: ICD-10-PCS | Performed by: INTERNAL MEDICINE

## 2022-09-22 PROCEDURE — 250N000013 HC RX MED GY IP 250 OP 250 PS 637: Performed by: INTERNAL MEDICINE

## 2022-09-22 PROCEDURE — C1785 PMKR, DUAL, RATE-RESP: HCPCS | Performed by: INTERNAL MEDICINE

## 2022-09-22 PROCEDURE — 250N000011 HC RX IP 250 OP 636: Performed by: INTERNAL MEDICINE

## 2022-09-22 PROCEDURE — 36415 COLL VENOUS BLD VENIPUNCTURE: CPT | Performed by: NURSE PRACTITIONER

## 2022-09-22 PROCEDURE — 99221 1ST HOSP IP/OBS SF/LOW 40: CPT | Mod: 57 | Performed by: INTERNAL MEDICINE

## 2022-09-22 PROCEDURE — 84100 ASSAY OF PHOSPHORUS: CPT | Performed by: NURSE PRACTITIONER

## 2022-09-22 PROCEDURE — 250N000013 HC RX MED GY IP 250 OP 250 PS 637: Performed by: NURSE PRACTITIONER

## 2022-09-22 PROCEDURE — 210N000002 HC R&B HEART CARE

## 2022-09-22 PROCEDURE — 82310 ASSAY OF CALCIUM: CPT | Performed by: NURSE PRACTITIONER

## 2022-09-22 PROCEDURE — 82746 ASSAY OF FOLIC ACID SERUM: CPT | Performed by: NURSE PRACTITIONER

## 2022-09-22 PROCEDURE — 85018 HEMOGLOBIN: CPT | Performed by: NURSE PRACTITIONER

## 2022-09-22 PROCEDURE — 82607 VITAMIN B-12: CPT | Performed by: NURSE PRACTITIONER

## 2022-09-22 PROCEDURE — 99207 PR NO BILLABLE SERVICE THIS VISIT: CPT | Performed by: NURSE PRACTITIONER

## 2022-09-22 PROCEDURE — 0JH606Z INSERTION OF PACEMAKER, DUAL CHAMBER INTO CHEST SUBCUTANEOUS TISSUE AND FASCIA, OPEN APPROACH: ICD-10-PCS | Performed by: INTERNAL MEDICINE

## 2022-09-22 PROCEDURE — 83735 ASSAY OF MAGNESIUM: CPT | Performed by: NURSE PRACTITIONER

## 2022-09-22 PROCEDURE — 86850 RBC ANTIBODY SCREEN: CPT | Performed by: NURSE PRACTITIONER

## 2022-09-22 PROCEDURE — 86901 BLOOD TYPING SEROLOGIC RH(D): CPT | Performed by: NURSE PRACTITIONER

## 2022-09-22 PROCEDURE — C1898 LEAD, PMKR, OTHER THAN TRANS: HCPCS | Performed by: INTERNAL MEDICINE

## 2022-09-22 PROCEDURE — 99233 SBSQ HOSP IP/OBS HIGH 50: CPT | Performed by: HOSPITALIST

## 2022-09-22 PROCEDURE — 82728 ASSAY OF FERRITIN: CPT | Performed by: NURSE PRACTITIONER

## 2022-09-22 PROCEDURE — 250N000009 HC RX 250: Performed by: INTERNAL MEDICINE

## 2022-09-22 PROCEDURE — 99152 MOD SED SAME PHYS/QHP 5/>YRS: CPT | Performed by: INTERNAL MEDICINE

## 2022-09-22 PROCEDURE — 85025 COMPLETE CBC W/AUTO DIFF WBC: CPT | Performed by: NURSE PRACTITIONER

## 2022-09-22 PROCEDURE — 33208 INSRT HEART PM ATRIAL & VENT: CPT | Performed by: INTERNAL MEDICINE

## 2022-09-22 PROCEDURE — 93005 ELECTROCARDIOGRAM TRACING: CPT

## 2022-09-22 PROCEDURE — 250N000011 HC RX IP 250 OP 636: Performed by: HOSPITALIST

## 2022-09-22 PROCEDURE — 272N000001 HC OR GENERAL SUPPLY STERILE: Performed by: INTERNAL MEDICINE

## 2022-09-22 DEVICE — PCMKR CARD ACCOLADE MRI EL DR: Type: IMPLANTABLE DEVICE | Status: FUNCTIONAL

## 2022-09-22 DEVICE — LEAD INGEVITY+ AF IS1 7841 52CM: Type: IMPLANTABLE DEVICE | Status: FUNCTIONAL

## 2022-09-22 DEVICE — LEAD INGEVITY+ AF IS1 7842 59CM: Type: IMPLANTABLE DEVICE | Status: FUNCTIONAL

## 2022-09-22 RX ORDER — LIDOCAINE 40 MG/G
CREAM TOPICAL
Status: DISCONTINUED | OUTPATIENT
Start: 2022-09-22 | End: 2022-09-22

## 2022-09-22 RX ORDER — HEPARIN SODIUM 200 [USP'U]/100ML
100-500 INJECTION, SOLUTION INTRAVENOUS CONTINUOUS PRN
Status: DISCONTINUED | OUTPATIENT
Start: 2022-09-22 | End: 2022-09-22 | Stop reason: HOSPADM

## 2022-09-22 RX ORDER — BUPIVACAINE HYDROCHLORIDE 2.5 MG/ML
INJECTION, SOLUTION EPIDURAL; INFILTRATION; INTRACAUDAL
Status: DISCONTINUED | OUTPATIENT
Start: 2022-09-22 | End: 2022-09-22 | Stop reason: HOSPADM

## 2022-09-22 RX ORDER — OXYCODONE AND ACETAMINOPHEN 5; 325 MG/1; MG/1
1 TABLET ORAL EVERY 4 HOURS PRN
Status: DISCONTINUED | OUTPATIENT
Start: 2022-09-22 | End: 2022-09-23 | Stop reason: HOSPADM

## 2022-09-22 RX ORDER — ACETAMINOPHEN 650 MG/1
650 SUPPOSITORY RECTAL EVERY 4 HOURS PRN
Status: DISCONTINUED | OUTPATIENT
Start: 2022-09-22 | End: 2022-09-23 | Stop reason: HOSPADM

## 2022-09-22 RX ORDER — HEPARIN SODIUM 200 [USP'U]/100ML
100-600 INJECTION, SOLUTION INTRAVENOUS CONTINUOUS PRN
Status: DISCONTINUED | OUTPATIENT
Start: 2022-09-22 | End: 2022-09-22 | Stop reason: HOSPADM

## 2022-09-22 RX ORDER — CEFAZOLIN SODIUM 1 G/3ML
1 INJECTION, POWDER, FOR SOLUTION INTRAMUSCULAR; INTRAVENOUS
Status: DISCONTINUED | OUTPATIENT
Start: 2022-09-22 | End: 2022-09-22 | Stop reason: HOSPADM

## 2022-09-22 RX ORDER — NALOXONE HYDROCHLORIDE 0.4 MG/ML
0.4 INJECTION, SOLUTION INTRAMUSCULAR; INTRAVENOUS; SUBCUTANEOUS
Status: DISCONTINUED | OUTPATIENT
Start: 2022-09-22 | End: 2022-09-23 | Stop reason: HOSPADM

## 2022-09-22 RX ORDER — MIDAZOLAM HCL IN 0.9 % NACL/PF 1 MG/ML
.5-6 PLASTIC BAG, INJECTION (ML) INTRAVENOUS CONTINUOUS PRN
Status: DISCONTINUED | OUTPATIENT
Start: 2022-09-22 | End: 2022-09-22 | Stop reason: HOSPADM

## 2022-09-22 RX ORDER — CEFAZOLIN SODIUM 2 G/100ML
2 INJECTION, SOLUTION INTRAVENOUS EVERY 8 HOURS
Status: DISPENSED | OUTPATIENT
Start: 2022-09-22 | End: 2022-09-23

## 2022-09-22 RX ORDER — DOBUTAMINE HYDROCHLORIDE 200 MG/100ML
5-40 INJECTION INTRAVENOUS CONTINUOUS PRN
Status: DISCONTINUED | OUTPATIENT
Start: 2022-09-22 | End: 2022-09-22 | Stop reason: HOSPADM

## 2022-09-22 RX ORDER — SODIUM CHLORIDE 450 MG/100ML
INJECTION, SOLUTION INTRAVENOUS CONTINUOUS
Status: DISCONTINUED | OUTPATIENT
Start: 2022-09-22 | End: 2022-09-22 | Stop reason: HOSPADM

## 2022-09-22 RX ORDER — NALOXONE HYDROCHLORIDE 0.4 MG/ML
0.2 INJECTION, SOLUTION INTRAMUSCULAR; INTRAVENOUS; SUBCUTANEOUS
Status: DISCONTINUED | OUTPATIENT
Start: 2022-09-22 | End: 2022-09-23 | Stop reason: HOSPADM

## 2022-09-22 RX ORDER — CYANOCOBALAMIN 1000 UG/ML
1000 INJECTION, SOLUTION INTRAMUSCULAR; SUBCUTANEOUS
Status: DISCONTINUED | OUTPATIENT
Start: 2022-09-22 | End: 2022-09-23 | Stop reason: HOSPADM

## 2022-09-22 RX ORDER — CEFAZOLIN SODIUM 2 G/100ML
2 INJECTION, SOLUTION INTRAVENOUS
Status: DISCONTINUED | OUTPATIENT
Start: 2022-09-22 | End: 2022-09-22 | Stop reason: HOSPADM

## 2022-09-22 RX ORDER — ACETAMINOPHEN 500 MG
1000 TABLET ORAL EVERY 6 HOURS PRN
Status: DISCONTINUED | OUTPATIENT
Start: 2022-09-22 | End: 2022-09-23 | Stop reason: HOSPADM

## 2022-09-22 RX ORDER — FENTANYL CITRATE 50 UG/ML
INJECTION, SOLUTION INTRAMUSCULAR; INTRAVENOUS
Status: DISCONTINUED | OUTPATIENT
Start: 2022-09-22 | End: 2022-09-22 | Stop reason: HOSPADM

## 2022-09-22 RX ADMIN — ASPIRIN 81 MG: 81 TABLET, COATED ORAL at 08:10

## 2022-09-22 RX ADMIN — OXYCODONE HYDROCHLORIDE AND ACETAMINOPHEN 1 TABLET: 5; 325 TABLET ORAL at 17:05

## 2022-09-22 RX ADMIN — LOSARTAN POTASSIUM 37.5 MG: 25 TABLET, FILM COATED ORAL at 08:10

## 2022-09-22 RX ADMIN — CYANOCOBALAMIN 1000 MCG: 1000 INJECTION, SOLUTION INTRAMUSCULAR at 14:39

## 2022-09-22 RX ADMIN — CEFAZOLIN SODIUM 2 G: 2 INJECTION, SOLUTION INTRAVENOUS at 17:28

## 2022-09-22 RX ADMIN — FAMOTIDINE 20 MG: 20 TABLET ORAL at 08:10

## 2022-09-22 RX ADMIN — ATORVASTATIN CALCIUM 40 MG: 40 TABLET, FILM COATED ORAL at 20:04

## 2022-09-22 RX ADMIN — FAMOTIDINE 20 MG: 20 TABLET ORAL at 20:04

## 2022-09-22 RX ADMIN — CEFAZOLIN 1 G: 1 INJECTION, POWDER, FOR SOLUTION INTRAMUSCULAR; INTRAVENOUS at 09:30

## 2022-09-22 ASSESSMENT — ACTIVITIES OF DAILY LIVING (ADL)
ADLS_ACUITY_SCORE: 29
ADLS_ACUITY_SCORE: 28
ADLS_ACUITY_SCORE: 29
ADLS_ACUITY_SCORE: 28

## 2022-09-22 NOTE — PRE-PROCEDURE
GENERAL PRE-PROCEDURE:   Procedure:  PPM  Date/Time:  9/22/2022 9:36 AM    Written consent obtained?: Yes    Risks and benefits: Risks, benefits and alternatives were discussed    Consent given by:  Patient  Patient states understanding of procedure being performed: Yes    Patient's understanding of procedure matches consent: Yes    Procedure consent matches procedure scheduled: Yes    Expected level of sedation:  Moderate  Appropriately NPO:  Yes  Mallampati  :  Grade 2- soft palate, base of uvula, tonsillar pillars, and portion of posterior pharyngeal wall visible  Lungs:  Lungs clear with good breath sounds bilaterally  Heart:  Normal heart sounds and rate  History & Physical reviewed:  History and physical reviewed and no updates needed  Statement of review:  I have reviewed the lab findings, diagnostic data, medications, and the plan for sedation

## 2022-09-22 NOTE — PLAN OF CARE
Occupational Therapy Discharge Summary    Reason for therapy discharge:    Discharged to [facility]    Progress towards therapy goal(s). See goals on Care Plan in [facility] electronic health record for goal details.  Goals not met.  Barriers to achieving goals:   discharge from facility.    Therapy recommendation(s):    Continued therapy is recommended.  Rationale/Recommendations:  OT Eval and treat at [facility] when medically indicated.          **Pt not seen by discharging therapist on this date, note written based on previous treating therapist's notes and recommendations

## 2022-09-22 NOTE — PLAN OF CARE
VS: VSS x /60   O2: 96% room air   Output: Urine occurrence in AM and afternoon.   Last BM: 9/21, formed small.   Activity: Bedrest post procedure. Left arm movement restricted for 2 weeks following pacemaker. Assist of 1.    Skin: Scattered skin moles.   Pain: Denies pain   Neuro: Alert and oriented x4.    Dressing: Dressing over pacemaker incision, clean dry and intact.   Diet: Advanced to cardiac diet (low saturated fat NA <2400) as tolerated from previous NPO status for procedure.   LDA: PIV in right arm, SL.    Equipment: Walker and Cane use   Plan: Continue plan of care.    Additional Info:       SN Marry

## 2022-09-22 NOTE — CODE/RAPID RESPONSE
[facility] RRT Note  9/22/2022   Time Called: 0604    RRT called for: Syncope    Assessment & Plan     Syncope suspect multifactorial 2/2 recent opioids, prerenal, ?vasovagal in setting of nausea.  Alternatively considered acute blood loss anemia, arrhythmia, pericardial effusion/tamponade, PE  Nausea possibly 2/2 medication effect (opioids) vs hypotension.  - Upon arrival, pt sitting upright in the chair, awake, alert, in no overt distress; however, diaphoretic, SBP 80s.  Nursing notes pt had been sitting in the chair when pt developed sudden nausea.  When nursing were present, pt suddenly lost consciousness for a few seconds.  Pt's SBP 80s initially.  No obvious seizure like activity noted.  After pt assisted back to bed, pt's SBP 130s-140s, no further diaphoresis noted, warm, dry, pink, mentating, pt reports feeling much improved.  Pt reports no chest pain, SOB.  No muffled heart tones noted.  After review of telemetry, no obvious arrhythmia noted.        INTERVENTIONS:  - Stat Hgb, BMP, Mg  - Stat EKG  - If Hgb acutely drops or becomes hemodynamically unstable, will likely proceed with imaging to ensure no pericardial effusion vs stat echocardiogram     At the end of the RRT pt reports feeling much improved, eating supper, SBP 140s    Discussed with and defer further cares to nursing and hospitalist;  Provided handoff to colleague, TONYA Brice, TANG, [facility], who notes will follow up on lab results, appreciate further management of pt    Interval History     Benjamin Elizabeth is a 87 year old male who was admitted on 9/21/2022 for generalized weakness, lightheadedness, gait instability.    Medical history significant for: HTN, HLP, chronic anemia, chronic thrombocytopenia, CAD, vitamin B12 deficiency    Code Status: Full Code    Allergies   No Known Allergies    Physical Exam   Vital Signs with Ranges:  Temp:  [97.7  F (36.5  C)-98.3  F (36.8  C)] 97.7  F  (36.5  C)  Pulse:  [46-60] 60  Resp:  [16-20] 16  BP: ()/(56-81) 86/56  SpO2:  [95 %-100 %] 97 %  I/O last 3 completed shifts:  In: 180 [P.O.:180]  Out: 400 [Urine:400]    Constitutional: Pt sitting upright in the chair, awake, alert, in no overt distress; however, diaphoretic, SBP 80s  Neck: No upper airway wheezes or stridor noted  Pulmonary: In no apparent respiratory distress, clear to auscultation bilaterally, no crackles or wheezes noted  Cardiovascular: Regular rate and rhythm, normal S1S2, no murmur, rub or gallop noted, no distant, muffled heart tones noted  GI: Round, soft  Skin/Integumen: Warm, dry, pink  Neuro: Awake, alert, clear speech, no obvious focal neuro deficit  Psych:  Calm  Extremities: Moving all extremities     Data       CBC with Diff:  Recent Labs   Lab Test 09/22/22  0625 09/21/22  0642   WBC 6.6 5.4   HGB 11.7* 11.8*   MCV 97 96   * 118*   INR  --  1.07        Comprehensive Metabolic Panel:  Recent Labs   Lab 09/22/22  0625      POTASSIUM 3.9   CHLORIDE 113*   CO2 25   ANIONGAP 5   GLC 86   BUN 22   CR 0.95   GFRESTIMATED 78   ERASTO 8.2*   MAG 2.1   PHOS 3.2       Time Spent on this Encounter   I spent 20 minutes on the unit/floor managing the care of Benjamin Elizabeth. Over 50% of my time was spent counseling the patient and/or coordinating care regarding services listed in this note.    TONYA [facility]

## 2022-09-22 NOTE — DISCHARGE INSTRUCTIONS
Discharge Instructions for Pacemaker Implantation  You have had a procedure to insert a pacemaker. Once inside your body, this small electronic device helps keep your heart from beating too slowly. A pacemaker can t fix existing heart problems. But it can help you feel better and have more energy. As you recover, follow all of the instructions you are given, including those below.  Activity  Follow the instructions you are given about limiting your activity.  Do not raise your arm on the incision side above shoulder level for 3 weeks. This gives the device lead wires time to attach securely inside your heart.  Ask your doctor when you can expect to return to work.  You can still exercise. It s good for your body and your heart. Talk with your doctor about an exercise plan.  Other Precautions  Follow your doctor's directions carefully for wound care. You may remove the outer dressing in 3 - 4 days. Leave the steri-strips in place; these will be removed at your 1-3 week follow-up. Never put any creams, lotions, or products like peroxide on an incision unless your doctor tells you to.   You may shower once the outer dressing has been removed.   Before you receive any treatment, tell all health care providers (including your dentist) that you have a pacemaker.  You will be given an ID card that contains information about your pacemaker. Always carry this card with you. You can show this card if your pacemaker sets off a metal detector. You should also show it to avoid screening with a hand-held security wand.  Keep your cell phone away from your pacemaker. Don t carry the phone in your shirt pocket, even when it s turned off.  Avoid strong magnets. Examples are those used in MRIs or in hand-held security wands.  Avoid strong electrical fields. Examples are those made by radio transmitting towers,  ham  radios, and heavy-duty electrical equipment.  Avoid leaning over the open martin of a running car. A running engine  creates an electrical field. Most household and yard appliances will not cause any problems. If you use any large power tools, such as an industrial , talk with your doctor.   Follow-Up  Follow up in the device clinic as scheduled. You have an appointment scheduled for 10/4/22. Your appointment is at 09:00am.   Make regular follow-up appointments with your device clinic. They will check the pacemaker to make sure it s working properly.  When to Call Your Device Clinic [phone]  Call your doctor immediately if you have any of the following:  Dizziness  Chest pain  Lack of energy  Fainting spells  Twitching chest muscles  Rapid pulse or pounding heartbeat  Shortness of breath  Pain around your pacemaker  Fever above 100.4 F (38 C) or other signs of infection (redness, swelling, drainage, or warmth at the incision site)  Hiccups that won t stop     8632-5941 [employer]. [address] [place] [address]. All rights reserved. This information is not intended as a substitute for professional medical care. Always follow your healthcare professional's instructions.    [facility] in [place]: [phone]  Device Clinic (Monday to Friday, 8am-4pm): [phone]  *The device clinic is closed on weekends and holidays.  Any calls received during this time will be answered on the next business  day. For any urgent questions after hours, please call the main clinic number and you will be put in contact with the cardiologist on call.

## 2022-09-22 NOTE — PLAN OF CARE
S/p PPM today. Incision on the left chest C/D/I. A&O x4.  Neuros intact. VSS on RA. Up w/ Ax1 w walker. Tele: AV paced. Reports tolerable incisional pain. Voiding. Tolerating cardiac diet. Takes pills whole. Alarms on. Plan for CXR in AM.     Given percocet around 1700 for moderate incisional pain. Pt then taken to the bathroom and sat up in chair for dinner. Nurse at bedside around 1810 when pt complained of nausea, dry heaving. Had a syncopal episode in the chair and became diaphoretic/hypotensive. RRT called. . No dysrhythmias evident on monitor. Pt transferred back to bed by staff and after lying down felt much better. BP increased to 141/69. Labs ordered.

## 2022-09-22 NOTE — PLAN OF CARE
Physical Therapy Discharge Summary    Reason for therapy discharge:    Discharged to [facility]    Progress towards therapy goal(s). See goals on Care Plan in [facility] electronic health record for goal details.  Goals not met.  Barriers to achieving goals:   discharge from facility.    Therapy recommendation(s):    Continued therapy is recommended.  Rationale/Recommendations:  for mobility and balance post possible pacemaker placement..

## 2022-09-22 NOTE — PROGRESS NOTES
[facility]    Hospitalist Progress Note    Interval History   Patient is awake and alert.  No acute events overnight.  Continues to have significant fatigue and weakness especially when he gets up and walks.  Needed 2 person assist to use the restroom.  Mostly symptomatic when he is sitting down.    -Data reviewed today: I reviewed all new labs and imaging results over the last 24 hours. I personally reviewed the EKG tracing showing Second-degree AV block with moments of 2 is to 1 block.    Physical Exam   Temp: 98.2  F (36.8  C) Temp src: Oral BP: (!) 172/78 Pulse: 60   Resp: 16 SpO2: 96 % O2 Device: Nasal cannula    Vitals:    09/21/22 1840 09/22/22 0433   Weight: 89.1 kg (196 lb 6.4 oz) 87.8 kg (193 lb 8 oz)     Vital Signs with Ranges  Temp:  [97.3  F (36.3  C)-98.3  F (36.8  C)] 98.2  F (36.8  C)  Pulse:  [42-60] 60  Resp:  [16-20] 16  BP: (127-172)/(47-79) 172/78  SpO2:  [94 %-99 %] 96 %  I/O last 3 completed shifts:  In: 180 [P.O.:180]  Out: 400 [Urine:400]    Physical Exam  Constitutional:       Appearance: Normal appearance.   HENT:      Head: Normocephalic.   Eyes:      Pupils: Pupils are equal, round, and reactive to light.   Cardiovascular:      Rate and Rhythm: Normal rate and regular rhythm.      Pulses: Normal pulses.      Heart sounds: Normal heart sounds.   Pulmonary:      Effort: Pulmonary effort is normal. No respiratory distress.      Breath sounds: Normal breath sounds.   Abdominal:      General: Abdomen is flat. Bowel sounds are normal. There is no distension.      Tenderness: There is no abdominal tenderness. There is no guarding.   Musculoskeletal:         General: Normal range of motion.      Cervical back: Normal range of motion.   Skin:     General: Skin is warm and dry.   Neurological:      General: No focal deficit present.   Psychiatric:         Mood and Affect: Mood normal.           Medications       aspirin  81 mg Oral Daily     atorvastatin  40 mg Oral QPM      ceFAZolin  2 g Intravenous Q8H     famotidine  20 mg Oral BID     losartan  37.5 mg Oral Daily     sodium chloride (PF)  3 mL Intracatheter Q8H       Data   Recent Labs   Lab 09/22/22  0625 09/21/22  0642 09/20/22  2117 09/20/22  1153 09/19/22  1946   WBC 6.6 5.4  --   --  5.9   HGB 11.7* 11.8*  --   --  13.2*   MCV 97 96  --   --  99   * 118*  --   --  133*   INR  --  1.07  --   --   --     140  --   --  141   POTASSIUM 3.9 4.2  --   --  4.6   CHLORIDE 113* 107  --   --  104   CO2 25 24  --   --  27   BUN 22 20.9  --   --  27.8*   CR 0.95 1.03  --   --  1.07   ANIONGAP 5 9  --   --  10   ERASTO 8.2* 8.6*  --   --  9.2   GLC 86 80 147*   < > 89    < > = values in this interval not displayed.       Recent Results (from the past 24 hour(s))   EP Device    Narrative    PROCEDURES PERFORMED:  1. Conscious sedation.  2. Cardiac fluoroscopy.  3. Dual-chamber permanent pacemaker implantation.    INDICATION: Symptomatic bradycardia secondary to second degree AV block.    HPI: 87-year-old gentleman with a history of hypertension, hyperlipidemia   and ascending aortic root dilation, who presents with intermittent   episodes of dizziness and balance issues.  He was found to have   symptomatic bradycardia associated with second-degree AV block.  No   reversible cause were identified, and he was referred for permanent   pacemaker implantation.      Risks and benefits of the procedure were reviewed including but not   limited to arrhythmia, pain, infection, bleeding, skin damage from ionized   radiation, kidney damage or allergic reactions to conscious dye, injury to   the neighboring vascular structures, need for emergent cardiopulmonary   resuscitation, heart attack, stroke or death. Alternatives were discussed   including doing nothing. All questions were answered to the patient's   satisfaction. Informed consent was obtained and patient wished to proceed.    FLUOROSCOPY DATA:  Fluoro time:  0.3  minutes.  Radiation dose (AK): 0.65 mGy.  Dose-area product (DAP): 0.02603 Gy.cm2.    DESCRIPTION OF PROCEDURE: After written informed consent was obtained,   patient was brought to the EP lab. An intravenous infusion of prophylactic   antibiotic was begun. The chest was sterilely prepped from the level of   iliac crest to level of the chin with antibiotic soap and disinfectant,   draped appropriately. Following infiltration with 1% lidocaine, an   incision was made parallel to and 1 cm beneath the clavicle and extended   across the deltopectoral groove. Using blunt dissection, the excision was   extended down the anterior pectoral fascia. Using blunt and sharp   dissection, a pocket was developed parallel to the fascia and extended   inferiorly.    Two pocket punctures via fluoroscopy guidance and modified Seldinger   technique was used to attain access into the left subclavian vein. A 6   Russian sheath was inserted over the wire. The right ventricular lead was   advanced under fluoroscopy and a secure location found. The lead was   fixated.  Stimulation and sensing thresholds were found to be   satisfactory. No diaphragmatic stimulation was noticed with high output   pacing. The lead was sutured to the underlying pectoral muscle with   interrupted 0 silk suture over a plastic collar. A 6 Russian sheath was   inserted over the wire. The right atrial lead was advanced under   fluoroscopy and secure location found. The lead was fixated. Stimulation   and sensing thresholds were found to be satisfactory. No diaphragmatic   stimulation was noted with high output pacing. The lead was sutured to the   underlying pectoral muscle with interrupted 0 silk suture over a plastic   collar.    After irrigation with saline solution, the pocket was inspected, no   bleeding was seen. The generator was connected to the leads and inserted   into the pocket. The wound was closed with two layers of subcutaneous   sutures.    PACEMAKER  GENERATOR:  Person[employer], model L331, serial #[id number].    LEAD INFORMATION:  Right atrial lead: [employer] Realtime[employer], model 7842, serial #[id number].  Right ventricular lead: [employer], model 7841, serial #[id number].    MEASURED DATA:  Right atrial lead: Sensing 4.5 mV, threshold 0.5 volts at 0.4 msec,   impedance 573 ohms.  Right ventricular lead: Sensing 6.2 mV, threshold 0.5 volt at 0.4 msec,   impedance 984 ohms.    IMPRESSION:  1. Successful dual chamber pacemaker implantation in left infraclavicular   region above the pectoral fascia.  2. Bradycardia pacing set to DDDR .    RECOMMENDATIONS:  1. Avoid vascular access to the left jugular and subclavian veins.  2. Keep wound clean, dry and covered for seven days.  3. PA and lateral chest x-ray to rule out dislodgement.  4. Device interrogation prior to discharge.  5. Left arm in sling overnight and then off in the morning.  6. May start oral medications. Avoid IV or subcutaneous heparin for at   least two weeks. If heparin must be used, please contract the procedural   team to discuss.  7. Wound care as follows:  a) Do not get incision wet for three days.  b) Leave the Steri-Strips in place, allow to come off naturally. If they   do not come off in two weeks, you may remove them.  c) Check incision daily and call if they notice one of the following:   redness, drainage (pus or blood), swelling, warmth or if you develop   fever.    If you have questions regarding wound care, please contact our office.     Blaze Cain MD         Assessment & Plan      Benjamin Elizabeth is a 87 year old male with PMH significant for HTN, HLD, chronic anemia and thrombocytopenia and BLE edema who was admitted to [facility] from 9/19-9/21 after presenting to the ED with 3-days of generalized weakness, lightheadedness and gait instability. Work-up in the ED included MRI that demonstrated a lacunar infarct of the right frontal lobe concerning for acute CVA. Neurology  was consulted due to concerns for possible CVA. He was seen by Stroke Neurology. MRI's were reviewed with Neuro radiology. Neurology endorsing that the right frontal DWI abnormality seen on MRI is not felt to clearly represent stroke and is favored to be artifact, in addition to there being no clear clinical or radiographic evidence of stroke. No additional stroke work-up was recommended. Patient was resumed on ASA and statin.      In addition, his initial ECG showed Mobitz Type I with telemetry evidence revealing Mobitz Type II heart block. His heart rates have maintained in the 40's even with exertion. Normal TSH. Cardiology was consulted. Echocardiogram showed mild dilated LV but overall normal LV systolic function. Review of ECG and telemetry strips does show sinus bradycardia with a very prolonged MA interval and evidence of Wenckebach Type I Second degree AV block, also with evidence of Type II Second Degree AV block. He is currently experiencing symptoms of dizziness and gait instability. He also was noted to have a very flat troponin elevation that is non-specific without signs of ischemia on ECG. There are no WMA's on echo with coronary angio in 2019 showing only mild CAD. Due to evidence of Type II Second Degree AV Block, it was recommended that the patient transfer to [facility] for EP consultation and PPM placement.         Second Degree Heart Block  Bradycardia  Dizziness  Elevated Troponin  Admitted to [facility] from 9/19-9/21. Symptoms believed to be 2/2 second degree heart block and not on chasidy blocking agents prior to admission. Heart rates 40's to 60's with dizziness and gait instability. Unclear if weakness, dizziness and gait instability are related to his heart block.   -9/19 Echo: Mildly dilated LV with normal systolic function, no WMA's, EF 55-60%. Normal RV size and function, mild-moderate biatrial enlargement with no evidence of atrial shunt, trace to mild tricuspid regurg and mild aortic root  "dilatation  -Mildly elevated flat troponin 26-->26-->23 without signs of ischemia on prior ECG's              -No WMA's on echo              -Angio 2019 with mild CAD              -No active CP              -Monitor  -Admit to [facility]  -Telemetry monitoring  -Pacing pads to be placed on bedside at all times  -Cardiology consulted.  Recommended proceeding with pacemaker placement since his bradycardia does not appear to be due to reversible cause.  Pacemaker to be placed today.  -BR with commode/bathroom privileges with assist only until PPM placed  -Monitor lytes and replete as needed           Right Frontal DWI Abnormality (Seen on MRI 9/19/2022) Likely Artifact   Gait Instability   -Seen by Neurology at [facility] per note review   -MRI images were reviewed with Neuro Radiology and the right frontal DWI abnormality is not felt to clearly represent stroke and favored to be artifact and not stroke (Per Neurology note from 9/21)  -No additional stroke work-up or treatment was recommended per Neurology  -Recommended continuation of PTA ASA and Lipitor   -PT/OT/ST              -Therapies consulted at [facility] had recommended OP PT              -Will need follow-up therapy evaluation s/p PPM  -Falls risk  -Observed by nursing with \"wobbly\" and unsteady gait during admission when patient was walking to his bed  -If overall weakness and ataxia do not improve with therapy and time, will need OP General Neurology evaluation for underlying neurological process        Hypertension  Hyperlipidemia  CAD  Coronary angio in 2019 showing mild CAD.  -PTA regimen: Losartan 37.5 mg and Atorvastatin 40 mg daily  -Resume statin/ASA  -Will resume losartan with hold parameters; would avoid hypotension so as to not exacerbate his overall symptoms of dizziness  -Orthostatic blood pressures              -Nursing to check and document              -If BP's low, consider adjusting anti-hypertensive meds  -BP on admit: 172/74 and had just been settled " "in bed              -Continue to monitor VS              -Will hold on adding PRN's due to above and attempting to avoid hypotension        Chronic Anemia   Chronic Thrombocytopenia  Vitamin B12 deficiency  -Has followed with Heme/Onc OP with anemia noted in June 2016              -At that time, found to have B12 deficiency anemia that improved with               B12 replacement              -Thrombocytopenia thought 2/2 possible clumping  -Not currently on B12 PTA  -B12 level at 166.  Ferritin within normal limits.  -Hgb 11.8 and was in the 13 range 2 days prior   -Platelet count low at 107.; currently on ASA and will resume  -Monitor labs; no obvious signs of bleeding           Clinically Significant Risk Factors Present on Admission                # Thrombocytopenia: Plts = 107 10e3/uL (Ref range: 150 - 450 10e3/uL) on admission, will monitor for bleeding  # Hypertension: home medication list includes antihypertensive(s)    # Overweight: Estimated body mass index is 25.53 kg/m  as calculated from the following:    Height as of 9/20/22: 1.854 m (6' 1\").    Weight as of this encounter: 87.8 kg (193 lb 8 oz).         DVT Prophylaxis: Pneumatic Compression Devices  Code Status: Full Code  Disposition: Expected discharge when medically stable      Shira Julian MD, MD  [phone](p)  "

## 2022-09-22 NOTE — PLAN OF CARE
Goal Outcome Evaluation:  A&O x4, forgetful at times-easy to reorient. Neuros intact. VSS ex david HR tele showed 3rd degree AVB at times 2nd degree, asymptomatic-MD aware. Denies SOB/dizziness/CP. SBA, NPO, palns for EP consult & PPM placement today.

## 2022-09-22 NOTE — CONSULTS
"Electrophysiology/ Cardiology- Consult Note         H&P and Plan:     Reason for consult: Symptomatic bradycardia.      History of present illness: Patient is a pleasant 87-year old lady with a history of hypertension, hyperlipidemia and the ascending aortic root dilation, was admitted with dizziness and issues with balance at [facility].  During hospital stay, he was found to have symptomatic bradycardia associated with second-degree AV block.  No reversible cause were identified, and he was transferred to [facility] for consideration for pacemaker implantation.    At the moment, he is doing well.  He has no complaints during this visit. He denies any symptoms such as chest pain, shortness of breath, palpitation, lightheadedness, near syncope or syncope.     EKG shows second-degree AV block with moments of 2: 1 AV block    Previous studies:  -Echo (90/20/22): Normal V function.  EF of 55 to 60%.  Mild MR/TR.  -Cath (11/2019): Minimal nonobstructive disease.    Plan:  1.  Symptomatic bradycardia.  No reversible cause identified.  I recommend permanent pacemaker plantation.  Procedure was explained details.  Understand there is a 1-2% risk of cases or procedure for consent was signed.    Blaze Cain MD    Clinically Significant Risk Factors Present on Admission               # Thrombocytopenia: Plts = 107 10e3/uL (Ref range: 150 - 450 10e3/uL) on admission, will monitor for bleeding   # Overweight: Estimated body mass index is 25.53 kg/m  as calculated from the following:    Height as of 9/20/22: 1.854 m (6' 1\").    Weight as of this encounter: 87.8 kg (193 lb 8 oz).      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  Pt is in NAD.  HEAD: Normocephalic.  SKIN: Skin normal color, texture and turgor with no lesions or eruptions.  Eyes: PERRL, EOMI.  ENT:  Supple, normal JVP. No lymphadenopathy or thyroid enlargement.  Chest:  CTAB.  Cardiac:   RRR, normal  S1 and S2.  No murmurs rubs or gallop.   Abdomen:  Normal BS.  Soft, non-tender and non-distended.  No rebound or guarding.    Extremities:  Pedious pulses palpable B/L.  No LE edema noticed.   Neurological: Strength and sensation grossly symmetric and intact throughout.         Review of Systems:  Complete review of system is otherwise negative with the exception of what was described above.     CURRENT MEDICATIONS:    aspirin  81 mg Oral Daily     atorvastatin  40 mg Oral QPM     famotidine  20 mg Oral BID     losartan  37.5 mg Oral Daily     sodium chloride (PF)  3 mL Intracatheter Q8H     PRN Meds: acetaminophen **OR** acetaminophen, lidocaine 4%, lidocaine (buffered or not buffered), melatonin, senna-docusate **OR** senna-docusate, sodium chloride (PF)    ALLERGIES   No Known Allergies    PAST MEDICAL HISTORY:  No past medical history on file.    PAST SURGICAL HISTORY:  No past surgical history on file.    FAMILY HISTORY:  The patient's family history was reviewed and is non-contributory for heart disease.    SOCIAL HISTORY:  Social History     Socioeconomic History     Marital status:          Recent Lab Results:  Recent Labs   Lab 09/22/22  0625 09/21/22  0642 09/20/22  2117 09/20/22  1153 09/19/22  1946   WBC 6.6 5.4  --   --  5.9   HGB 11.7* 11.8*  --   --  13.2*   MCV 97 96  --   --  99   * 118*  --   --  133*   INR  --  1.07  --   --    --     140  --   --  141   POTASSIUM 3.9 4.2  --   --  4.6   CHLORIDE 113* 107  --   --  104   CO2 25 24  --   --  27   BUN 22 20.9  --   --  27.8*   CR 0.95 1.03  --   --  1.07   ANIONGAP 5 9  --   --  10   ERASTO 8.2* 8.6*  --   --  9.2   GLC 86 80 147*   < > 89    < > = values in this interval not displayed.

## 2022-09-22 NOTE — UTILIZATION REVIEW
[facility]   Admission Status; Secondary Review Determination   Admission date:  9/21/2022  6:29 PM    Under the authority of the Utilization Management Committee, the utilization review process indicated a secondary review on the above patient. The review outcome is based on review of the medical records, discussions with staff, and applying clinical experience noted on the date of the review.     (x) Inpatient Status Appropriate - This patient's medical care is consistent with medical management for inpatient care and reasonable inpatient medical practice.     RATIONALE FOR DETERMINATION   87-year-old male with a history of hypertension, chronic anemia, and hyperlipidemia presented to the [facility] emergency department and was admitted on 9/19 with generalized weakness, lightheadedness, and gait instability.  Work-up was suggestive of an acute lacunar infarct.  Neurology was consulted and started antiplatelet medications and perform stroke work-up.  There was also evidence of Mobitz type II heart block which was thought to be symptomatic with persistent heart rates in the 40s.  Cardiology was consulted and his heart rate did not improve so transfer to [facility] was performed on 9/21.  Cardiology was again consulted and patient will undergo pacemaker implantation today.  This patient is complicated, has multiple ongoing issues, is high risk for clinical deterioration, has already been hospitalized 4 days, and is appropriate for inpatient hospitalization at the time of this review.  The severity of illness, intensity of service provided, expected LOS and risk for adverse outcome make the care complex, high risk and appropriate for hospital admission.    At the time of admission with the information available to the attending physician more than 2 nights Hospital complex care was anticipated, based on patient risk of adverse outcome if treated as outpatient and  complex care required.  Inpatient admission is appropriate based on the Medicare guidelines.      The information on this document is developed by the utilization review team in order for the business office to ensure compliance. This only denotes the appropriateness of proper admission status and does not reflect the quality of care rendered.   The definitions of Inpatient Status and Observation Status used in making the determination above are those provided in the CMS Coverage Manual, Chapter 1 and Chapter 6, section 70.4.     Sincerely,   Gregory Chaudhary DO MPH   Physician Advisor  Utilization Review  [facility]

## 2022-09-23 ENCOUNTER — APPOINTMENT (OUTPATIENT)
Dept: GENERAL RADIOLOGY | Facility: CLINIC | Age: 87
DRG: 244 | End: 2022-09-23
Attending: INTERNAL MEDICINE
Payer: COMMERCIAL

## 2022-09-23 VITALS
HEART RATE: 63 BPM | OXYGEN SATURATION: 95 % | BODY MASS INDEX: 25.58 KG/M2 | RESPIRATION RATE: 16 BRPM | TEMPERATURE: 97.5 F | SYSTOLIC BLOOD PRESSURE: 131 MMHG | DIASTOLIC BLOOD PRESSURE: 78 MMHG | WEIGHT: 193.9 LBS

## 2022-09-23 DIAGNOSIS — I44.1 SECOND DEGREE ATRIOVENTRICULAR BLOCK: Primary | ICD-10-CM

## 2022-09-23 DIAGNOSIS — Z95.0 CARDIAC PACEMAKER IN SITU: ICD-10-CM

## 2022-09-23 LAB — GLUCOSE BLDC GLUCOMTR-MCNC: 112 MG/DL (ref 70–99)

## 2022-09-23 PROCEDURE — 999N000065 XR CHEST 2 VIEWS

## 2022-09-23 PROCEDURE — 250N000013 HC RX MED GY IP 250 OP 250 PS 637: Performed by: NURSE PRACTITIONER

## 2022-09-23 PROCEDURE — 250N000011 HC RX IP 250 OP 636: Performed by: INTERNAL MEDICINE

## 2022-09-23 PROCEDURE — 99024 POSTOP FOLLOW-UP VISIT: CPT | Mod: FS | Performed by: INTERNAL MEDICINE

## 2022-09-23 PROCEDURE — 99239 HOSP IP/OBS DSCHRG MGMT >30: CPT | Performed by: HOSPITALIST

## 2022-09-23 RX ORDER — CYANOCOBALAMIN 1000 UG/ML
1000 INJECTION, SOLUTION INTRAMUSCULAR; SUBCUTANEOUS
Qty: 1 ML | Refills: 4 | Status: ON HOLD | OUTPATIENT
Start: 2022-09-23 | End: 2022-09-30

## 2022-09-23 RX ADMIN — LOSARTAN POTASSIUM 37.5 MG: 25 TABLET, FILM COATED ORAL at 09:04

## 2022-09-23 RX ADMIN — ACETAMINOPHEN 1000 MG: 500 TABLET ORAL at 02:02

## 2022-09-23 RX ADMIN — ASPIRIN 81 MG: 81 TABLET, COATED ORAL at 09:04

## 2022-09-23 RX ADMIN — CEFAZOLIN SODIUM 2 G: 2 INJECTION, SOLUTION INTRAVENOUS at 01:01

## 2022-09-23 RX ADMIN — FAMOTIDINE 20 MG: 20 TABLET ORAL at 09:04

## 2022-09-23 ASSESSMENT — ACTIVITIES OF DAILY LIVING (ADL)
ADLS_ACUITY_SCORE: 28
ADLS_ACUITY_SCORE: 29
ADLS_ACUITY_SCORE: 28
ADLS_ACUITY_SCORE: 28

## 2022-09-23 NOTE — PROGRESS NOTES
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 Transferred to [facility] on 9/21/2022 for evidence of high degree HB (Mobitz II).  Now s/p dual chamber PPM.    1. 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 No arrhythmias noted.   - Device teaching PENDING    - Last night@ 1818 had RRT called for a syncopal episode. He suddenly got nauseated while sitting up in a chair. Few seconds of LOC with SBPs in 80s. Diaphoretic   No arrhythmia on tele noted   SBPs improved to 130-140s once back into bed   BMP, Mg OK   Hgb low/stable - no acute blood loss noted   EKG showed APVP 60 bpm.    - Reviewed that PPM should improve his sxs related to bradycardia. He understands that not all of his gait instability, weakness, fatigue may be related to this     PLAN:   1. Routine PPM cares   2. Episode of syncope a/w diaphoresis/nausea likely vasovagal. No arrhythmias noted on device interrogation   3. OK to discharge from EP standpoint. EP RNs will set up follow-up appt    2. HTN -   - PTA on losartan 37.5 mg   - This has been continued. SPBs 110-150s     PLAN:   1. Continue losartan    3. R frontal DWI Abnormality (MRI 9/19/2022) - likely artifact -   - Seen by Neurology @ [facility] and reviewed with Neuro Radiology  - not clearly a stroke, favored to be artifact  - PTA ASA, statin continued     PLAN:    1. Per Hospitalist    4. Aortic Dilation -  - Echo this admission 9/20/2022 with LVEF 55-60%. Nl RV. 1+ MR. Trace TR. 1+ AI. Mild aortic  root dilation 4.2 cm, moderately dilated ascending aorta 4.5 cm   PLAN:   1. Continue ARB   2. Routine follow-up     Meena Qureshi PA-C, [facility]    Interval History   Feeling 'good' this AM; sitting in chair. Wife Miya in room  Reviewed his RRT last night    Physical Exam   Temp: 97.8  F (36.6  C) Temp src: Axillary BP: (!) 151/73 Pulse: 65   Resp: 14 SpO2: 96 % O2 Device: None (Room air)    Vitals:    09/21/22 1840 09/22/22 0433 09/23/22 0300   Weight: 89.1 kg (196 lb 6.4 oz) 87.8 kg (193 lb 8 oz) 88 kg (193 lb 14.4 oz)     Vital Signs with Ranges  Temp:  [97.7  F (36.5  C)-98.2  F (36.8  C)] 97.8  F (36.6  C)  Pulse:  [52-76] 65  Resp:  [14-18] 14  BP: ()/(56-81) 151/73  SpO2:  [95 %-100 %] 96 %  I/O last 3 completed shifts:  In: 240 [P.O.:240]  Out: -     Telemetry: APVP 60 bpm    Constitutional: Awake, alert  Respiratory: CTA B  Cardiovascular: Regular. PPM pocket dressing c/d  Musculoskeletal: No edema    Medications       aspirin  81 mg Oral Daily     atorvastatin  40 mg Oral QPM     ceFAZolin  2 g Intravenous Q8H     cyanocobalamin  1,000 mcg Intramuscular Q30 Days     famotidine  20 mg Oral BID     losartan  37.5 mg Oral Daily     sodium chloride (PF)  3 mL Intracatheter Q8H       Data   I personally reviewed the EKG tracing showing APVP 60 bpm.  Results for orders placed or performed during the hospital encounter of 09/21/22 (from the past 24 hour(s))   EP Device    Narrative    PROCEDURES PERFORMED:  1. Conscious sedation.  2. Cardiac fluoroscopy.  3. Dual-chamber permanent pacemaker implantation.    INDICATION: Symptomatic bradycardia secondary to second degree AV block.    HPI: 87-year-old gentleman with a history of hypertension, hyperlipidemia   and ascending aortic root dilation, who presents with intermittent   episodes of dizziness and balance issues.  He was found to have   symptomatic bradycardia associated with second-degree AV block.  No   reversible cause were identified,  and he was referred for permanent   pacemaker implantation.      Risks and benefits of the procedure were reviewed including but not   limited to arrhythmia, pain, infection, bleeding, skin damage from ionized   radiation, kidney damage or allergic reactions to conscious dye, injury to   the neighboring vascular structures, need for emergent cardiopulmonary   resuscitation, heart attack, stroke or death. Alternatives were discussed   including doing nothing. All questions were answered to the patient's   satisfaction. Informed consent was obtained and patient wished to proceed.    FLUOROSCOPY DATA:  Fluoro time:  0.3 minutes.  Radiation dose (AK): 0.65 mGy.  Dose-area product (DAP): 0.04880 Gy.cm2.    DESCRIPTION OF PROCEDURE: After written informed consent was obtained,   patient was brought to the EP lab. An intravenous infusion of prophylactic   antibiotic was begun. The chest was sterilely prepped from the level of   iliac crest to level of the chin with antibiotic soap and disinfectant,   draped appropriately. Following infiltration with 1% lidocaine, an   incision was made parallel to and 1 cm beneath the clavicle and extended   across the deltopectoral groove. Using blunt dissection, the excision was   extended down the anterior pectoral fascia. Using blunt and sharp   dissection, a pocket was developed parallel to the fascia and extended   inferiorly.    Two pocket punctures via fluoroscopy guidance and modified Seldinger   technique was used to attain access into the left subclavian vein. A 6   [place] sheath was inserted over the wire. The right ventricular lead was   advanced under fluoroscopy and a secure location found. The lead was   fixated.  Stimulation and sensing thresholds were found to be   satisfactory. No diaphragmatic stimulation was noticed with high output   pacing. The lead was sutured to the underlying pectoral muscle with   interrupted 0 silk suture over a plastic collar. A 6 [place] sheath  was   inserted over the wire. The right atrial lead was advanced under   fluoroscopy and secure location found. The lead was fixated. Stimulation   and sensing thresholds were found to be satisfactory. No diaphragmatic   stimulation was noted with high output pacing. The lead was sutured to the   underlying pectoral muscle with interrupted 0 silk suture over a plastic   collar.    After irrigation with saline solution, the pocket was inspected, no   bleeding was seen. The generator was connected to the leads and inserted   into the pocket. The wound was closed with two layers of subcutaneous   sutures.    PACEMAKER GENERATOR:  [employer], model L331, serial #[id number].    LEAD INFORMATION:  Right atrial lead: [employer], model 7842, serial #[id number].  Right ventricular lead: [employer], model 7841, serial #[id number].    MEASURED DATA:  Right atrial lead: Sensing 4.5 mV, threshold 0.5 volts at 0.4 msec,   impedance 573 ohms.  Right ventricular lead: Sensing 6.2 mV, threshold 0.5 volt at 0.4 msec,   impedance 984 ohms.    IMPRESSION:  1. Successful dual chamber pacemaker implantation in left infraclavicular   region above the pectoral fascia.  2. Bradycardia pacing set to DDDR .    RECOMMENDATIONS:  1. Avoid vascular access to the left jugular and subclavian veins.  2. Keep wound clean, dry and covered for seven days.  3. PA and lateral chest x-ray to rule out dislodgement.  4. Device interrogation prior to discharge.  5. Left arm in sling overnight and then off in the morning.  6. May start oral medications. Avoid IV or subcutaneous heparin for at   least two weeks. If heparin must be used, please contract the procedural   team to discuss.  7. Wound care as follows:  a) Do not get incision wet for three days.  b) Leave the Steri-Strips in place, allow to come off naturally. If they   do not come off in two weeks, you may remove them.  c) Check incision daily and call if they notice one of the  following:   redness, drainage (pus or blood), swelling, warmth or if you develop   fever.    If you have questions regarding wound care, please contact our office.     Blaze Cain MD   EKG 12-lead, tracing only   Result Value Ref Range    Systolic Blood Pressure  mmHg    Diastolic Blood Pressure  mmHg    Ventricular Rate 60 BPM    Atrial Rate 60 BPM    OK Interval 208 ms    QRS Duration 172 ms     ms    QTc 542 ms    P Axis 62 degrees    R AXIS 263 degrees    T Axis 49 degrees    Interpretation ECG       AV dual-paced rhythm  Abnormal ECG  When compared with ECG of 20-SEP-2022 11:37,  Previous ECG has undetermined rhythm, needs review     Hemoglobin   Result Value Ref Range    Hemoglobin 11.9 (L) 13.3 - 17.7 g/dL   Basic metabolic panel   Result Value Ref Range    Sodium 139 133 - 144 mmol/L    Potassium 4.0 3.4 - 5.3 mmol/L    Chloride 110 (H) 94 - 109 mmol/L    Carbon Dioxide (CO2) 24 20 - 32 mmol/L    Anion Gap 5 3 - 14 mmol/L    Urea Nitrogen 21 7 - 30 mg/dL    Creatinine 0.94 0.66 - 1.25 mg/dL    Calcium 8.2 (L) 8.5 - 10.1 mg/dL    Glucose 113 (H) 70 - 99 mg/dL    GFR Estimate 79 >60 mL/min/1.73m2   Magnesium   Result Value Ref Range    Magnesium 2.0 1.6 - 2.3 mg/dL        Suturegard Intro: Intraoperative tissue expansion was performed, utilizing the SUTUREGARD device, in order to reduce wound tension.

## 2022-09-23 NOTE — DISCHARGE SUMMARY
[facility]    Discharge Summary  Hospitalist    Date of Admission:  9/21/2022  Date of Discharge:  9/23/2022    Discharge Diagnoses   Second degree atrioventricular block    History of Present Illness   Benjamin Elizabeth is an 87 year old male who presented with bradycardia and presyncope     Hospital Course   Benjamin Elizabeth was admitted on 9/21/2022.  The following problems were addressed during his hospitalization:    Benjamin Elizabeth is a 87 year old male with PMH significant for HTN, HLD, chronic anemia and thrombocytopenia and BLE edema who was admitted to [facility] from 9/19-9/21 after presenting to the ED with 3-days of generalized weakness, lightheadedness and gait instability. Work-up in the ED included MRI that demonstrated a lacunar infarct of the right frontal lobe concerning for acute CVA. Neurology was consulted due to concerns for possible CVA. He was seen by Stroke Neurology. MRI's were reviewed with Neuro radiology. Neurology endorsing that the right frontal DWI abnormality seen on MRI is not felt to clearly represent stroke and is favored to be artifact, in addition to there being no clear clinical or radiographic evidence of stroke. No additional stroke work-up was recommended. Patient was resumed on ASA and statin.      In addition, his initial ECG showed Mobitz Type I with telemetry evidence revealing Mobitz Type II heart block. His heart rates have maintained in the 40's even with exertion. Normal TSH. Cardiology was consulted. Echocardiogram showed mild dilated LV but overall normal LV systolic function. Review of ECG and telemetry strips does show sinus bradycardia with a very prolonged MI interval and evidence of Wenckebach Type I Second degree AV block, also with evidence of Type II Second Degree AV block. He is currently experiencing symptoms of dizziness and gait instability. He also was noted to have a very flat troponin elevation that is non-specific  without signs of ischemia on ECG. There are no WMA's on echo with coronary angio in 2019 showing only mild CAD. Due to evidence of Type II Second Degree AV Block, it was recommended that the patient transfer to [facility] for EP consultation and PPM placement.         Second Degree Heart Block  Bradycardia  Dizziness  Elevated Troponin  Admitted to [facility] [place] from 9/19-9/21. Symptoms believed to be 2/2 second degree heart block and not on chasidy blocking agents prior to admission. Heart rates 40's to 60's with dizziness and gait instability. Unclear if weakness, dizziness and gait instability are related to his heart block.   -9/19 Echo: Mildly dilated LV with normal systolic function, no WMA's, EF 55-60%. Normal RV size and function, mild-moderate biatrial enlargement with no evidence of atrial shunt, trace to mild tricuspid regurg and mild aortic root dilatation  -Mildly elevated flat troponin 26-->26-->23 without signs of ischemia on prior ECG's              -No WMA's on echo              -Angio 2019 with mild CAD              -No active CP              -Monitor  -Admit to [facility]  -Telemetry monitoring  -Pacing pads to be placed on bedside at all times  -Cardiology consulted.    -Proceeded with pacemaker placement due to bradycardia with type II heart blocks with no reversible etiology.  Patient tolerated the procedure well.  Monitored overnight.  Chest x-ray after procedure did not show any pneumothorax.  -Post pacemaker instructions were provided.  Outpatient follow-up arranged.  He did have 1 transient episode of emesis with hypotension with no abnormality in his rhythm.  This resolved spontaneously.  Patient was discharged home in a stable condition with follow-up with PCP in a week.          Right Frontal DWI Abnormality (Seen on MRI 9/19/2022) Likely Artifact   Gait Instability   -Seen by Neurology at [facility] [place] per note review   -MRI images were reviewed with Neuro Radiology and the right frontal DWI abnormality is not  "felt to clearly represent stroke and favored to be artifact and not stroke (Per Neurology note from 9/21)  -No additional stroke work-up or treatment was recommended per Neurology  -Recommended continuation of PTA ASA and Lipitor   -PT/OT/ST              -Therapies consulted at [facility] had recommended OP PT              -Will need follow-up therapy evaluation s/p PPM  -Falls risk  -Observed by nursing with \"wobbly\" and unsteady gait during admission when patient was walking to his bed  -If overall weakness and ataxia do not improve with therapy and time, will need OP General Neurology evaluation for underlying neurological process        Hypertension  Hyperlipidemia  CAD  Coronary angio in 2019 showing mild CAD.  -PTA regimen: Losartan 37.5 mg and Atorvastatin 40 mg daily  -Resume statin/ASA  -Will resume losartan with hold parameters; would avoid hypotension so as to not exacerbate his overall symptoms of dizziness  -Orthostatic blood pressures              -Nursing to check and document              -If BP's low, consider adjusting anti-hypertensive meds  -BP on admit: 172/74 and had just been settled in bed              -Continue to monitor VS              -Will hold on adding PRN's due to above and attempting to avoid hypotension        Chronic Anemia   Chronic Thrombocytopenia  Vitamin B12 deficiency  -Has followed with Heme/Onc OP with anemia noted in June 2016              -At that time, found to have B12 deficiency anemia that improved with               B12 replacement              -Thrombocytopenia thought 2/2 possible clumping  -Not currently on B12 PTA  -B12 level at 166.  Ferritin within normal limits.  Started on 1000 mcg vitamin B12 injections weekly for up to 6 doses followed by repeat check.  -Hgb 11.8 and was in the 13 range 2 days prior   -Platelet count low at 107.; currently on ASA and will resume  -Monitor labs; no obvious signs of bleeding                 Clinically Significant Risk Factors " "Present on Admission                    # Thrombocytopenia: Plts = 107 10e3/uL (Ref range: 150 - 450 10e3/uL) on admission, will monitor for bleeding  # Hypertension: home medication list includes antihypertensive(s)    # Overweight: Estimated body mass index is 25.53 kg/m  as calculated from the following:    Height as of 9/20/22: 1.854 m (6' 1\").    Weight as of this encounter: 87.8 kg (193 lb 8 oz).        DVT Prophylaxis: Pneumatic Compression Devices  Code Status: Full Code  Disposition: Expected discharge when medically stable        Shira Julian MD, MD  [phone](p)              Clinically Significant Risk Factors Present on Admission               # Overweight: Estimated body mass index is 25.58 kg/m  as calculated from the following:    Height as of 9/20/22: 1.854 m (6' 1\").    Weight as of this encounter: 88 kg (193 lb 14.4 oz).         Shira Julian MD, MD    Code Status   Full Code       Primary Care Physician   [facility]    Physical Exam   Temp: 97.5  F (36.4  C) Temp src: Oral BP: 131/78 Pulse: 63   Resp: 16 SpO2: 95 % O2 Device: None (Room air)    Vitals:    09/21/22 1840 09/22/22 0433 09/23/22 0300   Weight: 89.1 kg (196 lb 6.4 oz) 87.8 kg (193 lb 8 oz) 88 kg (193 lb 14.4 oz)     Vital Signs with Ranges  Temp:  [97.5  F (36.4  C)-98  F (36.7  C)] 97.5  F (36.4  C)  Pulse:  [60-76] 63  Resp:  [14-18] 16  BP: ()/(56-81) 131/78  SpO2:  [95 %-100 %] 95 %  I/O last 3 completed shifts:  In: 240 [P.O.:240]  Out: -     Physical Exam  Constitutional:       Appearance: Normal appearance.   HENT:      Head: Normocephalic.   Eyes:      Pupils: Pupils are equal, round, and reactive to light.   Cardiovascular:      Rate and Rhythm: Normal rate and regular rhythm.      Pulses: Normal pulses.      Heart sounds: Normal heart sounds.   Pulmonary:      Effort: Pulmonary effort is normal. No respiratory distress.      Breath sounds: Normal breath sounds.   Abdominal:      General: Abdomen " is flat. Bowel sounds are normal. There is no distension.      Tenderness: There is no abdominal tenderness. There is no guarding.   Musculoskeletal:         General: Normal range of motion.      Cervical back: Normal range of motion.   Skin:     General: Skin is warm and dry.   Neurological:      General: No focal deficit present.   Psychiatric:         Mood and Affect: Mood normal.           Discharge Disposition   Discharged to home  Condition at discharge: Stable    Consultations This Hospital Stay   ELECTROPHYSIOLOGY IP CONSULT  CARE MANAGEMENT / SOCIAL WORK IP CONSULT    Time Spent on this Encounter   IShira MD, personally saw the patient today and spent greater than 30 minutes discharging this patient.    Discharge Orders      Reason for your hospital stay    Bradycardia     Follow-up and recommended labs and tests     Follow up with primary care provider, [facility], within 7 days for hospital follow- up.  The following labs/tests are recommended: cbc, bmp in 1 week.     Activity    Your activity upon discharge: activity as tolerated     Diet    Follow this diet upon discharge: Orders Placed This Encounter      Low Saturated Fat Na <2400 mg     Discharge Medications   Current Discharge Medication List      START taking these medications    Details   cyanocobalamin (CYANOCOBALAMIN) 1000 MCG/ML injection Inject 1 mL (1,000 mcg) into the muscle every 7 days  Qty: 1 mL, Refills: 4    Associated Diagnoses: Second degree atrioventricular block         CONTINUE these medications which have NOT CHANGED    Details   aspirin 81 MG EC tablet Take 81 mg by mouth daily      atorvastatin (LIPITOR) 40 MG tablet Take 40 mg by mouth daily      losartan (COZAAR) 25 MG tablet Take 37.5 mg by mouth daily      multivitamin, therapeutic (THERA-VIT) TABS tablet Take 1 tablet by mouth daily           Allergies   No Known Allergies  Data   Recent Labs   Lab Test 09/22/22  2882 09/22/22  3212  09/21/22  0642 09/19/22  1946   WBC  --  6.6 5.4 5.9   HGB 11.9* 11.7* 11.8* 13.2*   MCV  --  97 96 99   PLT  --  107* 118* 133*   INR  --   --  1.07  --       Recent Labs   Lab Test 09/22/22  1859 09/22/22  1805 09/22/22  0625 09/21/22  0642     --  143 140   POTASSIUM 4.0  --  3.9 4.2   CHLORIDE 110*  --  113* 107   CO2 24  --  25 24   BUN 21 -- 22 20.9   CR 0.94  --  0.95 1.03   ANIONGAP 5  --  5 9   ERASTO 8.2*  --  8.2* 8.6*   * 112* 86 80         Results for orders placed or performed during the hospital encounter of 09/21/22   X-ray Chest 2 vws*    Narrative    CHEST TWO VIEWS 9/23/2022 8:29 AM     HISTORY: Implantable cardiac device placement, evaluate for  pneumothorax.    COMPARISON: None.    FINDINGS: Cardiac lead(s) noted that project over the cardiac  silhouette. No pneumothorax. There are no acute infiltrates. The  cardiac silhouette is stable. Pulmonary vasculature is unremarkable.      Impression    IMPRESSION: No acute disease.     ELBA OCHOA MD         SYSTEM ID:  [id number]   EP Device    Narrative    PROCEDURES PERFORMED:  1. Conscious sedation.  2. Cardiac fluoroscopy.  3. Dual-chamber permanent pacemaker implantation.    INDICATION: Symptomatic bradycardia secondary to second degree AV block.    HPI: 87-year-old gentleman with a history of hypertension, hyperlipidemia   and ascending aortic root dilation, who presents with intermittent   episodes of dizziness and balance issues.  He was found to have   symptomatic bradycardia associated with second-degree AV block.  No   reversible cause were identified, and he was referred for permanent   pacemaker implantation.      Risks and benefits of the procedure were reviewed including but not   limited to arrhythmia, pain, infection, bleeding, skin damage from ionized   radiation, kidney damage or allergic reactions to conscious dye, injury to   the neighboring vascular structures, need for emergent cardiopulmonary   resuscitation, heart  attack, stroke or death. Alternatives were discussed   including doing nothing. All questions were answered to the patient's   satisfaction. Informed consent was obtained and patient wished to proceed.    FLUOROSCOPY DATA:  Fluoro time:  0.3 minutes.  Radiation dose (AK): 0.65 mGy.  Dose-area product (DAP): 0.60573 Gy.cm2.    DESCRIPTION OF PROCEDURE: After written informed consent was obtained,   patient was brought to the EP lab. An intravenous infusion of prophylactic   antibiotic was begun. The chest was sterilely prepped from the level of   iliac crest to level of the chin with antibiotic soap and disinfectant,   draped appropriately. Following infiltration with 1% lidocaine, an   incision was made parallel to and 1 cm beneath the clavicle and extended   across the deltopectoral groove. Using blunt dissection, the excision was   extended down the anterior pectoral fascia. Using blunt and sharp   dissection, a pocket was developed parallel to the fascia and extended   inferiorly.    Two pocket punctures via fluoroscopy guidance and modified Seldinger   technique was used to attain access into the left subclavian vein. A 6   Martiniquais sheath was inserted over the wire. The right ventricular lead was   advanced under fluoroscopy and a secure location found. The lead was   fixated.  Stimulation and sensing thresholds were found to be   satisfactory. No diaphragmatic stimulation was noticed with high output   pacing. The lead was sutured to the underlying pectoral muscle with   interrupted 0 silk suture over a plastic collar. A 6 Martiniquais sheath was   inserted over the wire. The right atrial lead was advanced under   fluoroscopy and secure location found. The lead was fixated. Stimulation   and sensing thresholds were found to be satisfactory. No diaphragmatic   stimulation was noted with high output pacing. The lead was sutured to the   underlying pectoral muscle with interrupted 0 silk suture over a plastic    collar.    After irrigation with saline solution, the pocket was inspected, no   bleeding was seen. The generator was connected to the leads and inserted   into the pocket. The wound was closed with two layers of subcutaneous   sutures.    PACEMAKER GENERATOR:  [employer], model L331, serial #[id number].    LEAD INFORMATION:  Right atrial lead: [employer], model 7842, serial #[id number].  Right ventricular lead: [employer], model 7841, serial #[id number].    MEASURED DATA:  Right atrial lead: Sensing 4.5 mV, threshold 0.5 volts at 0.4 msec,   impedance 573 ohms.  Right ventricular lead: Sensing 6.2 mV, threshold 0.5 volt at 0.4 msec,   impedance 984 ohms.    IMPRESSION:  1. Successful dual chamber pacemaker implantation in left infraclavicular   region above the pectoral fascia.  2. Bradycardia pacing set to DDDR .    RECOMMENDATIONS:  1. Avoid vascular access to the left jugular and subclavian veins.  2. Keep wound clean, dry and covered for seven days.  3. PA and lateral chest x-ray to rule out dislodgement.  4. Device interrogation prior to discharge.  5. Left arm in sling overnight and then off in the morning.  6. May start oral medications. Avoid IV or subcutaneous heparin for at   least two weeks. If heparin must be used, please contract the procedural   team to discuss.  7. Wound care as follows:  a) Do not get incision wet for three days.  b) Leave the Steri-Strips in place, allow to come off naturally. If they   do not come off in two weeks, you may remove them.  c) Check incision daily and call if they notice one of the following:   redness, drainage (pus or blood), swelling, warmth or if you develop   fever.    If you have questions regarding wound care, please contact our office.     Blaze Cain MD

## 2022-09-23 NOTE — PROGRESS NOTES
Device Discharge  Dressing:  Clean, dry, and intact  Chest XR reviewed:  Yes  Pneumo present?:  No  Lead Measurements per Device Rep:  WNL    Education Provided:  Avoid raising left arm above the level of the shoulder for 3 weeks.  Do not shower until outer occlusive dressing has been removed.  Remove outer dressing in 3 - 4 days.  Leave steri-strips in place, these will be removed at the 1 week check.   Call Device Clinic with increased swelling, drainage, or fever > 101 degrees.   Follow-up with the Device Clinic as scheduled.    ANGEL Webber

## 2022-09-23 NOTE — PLAN OF CARE
Discharge instructions & safety reviewed, all questions answered. Discharge medications given to patient & reviewed. All belongings sent home with patient. Patient discharged home with wife.     Date & Time: 9/23 4649-5226  Diagnosis: 2nd* AV block  Procedures: 9/22 - PPM placed   Orientation/Cognitive: AOx4, Stebbins  VS/O2: VSS, RA  Mobility: SBA + walker   Diet: Cardiac  Pain Management: PRN tylenol   Bowel & Bladder: Continent   Skin: Georgi, PPM site - CDI, +1 generalized edema   Abnormal Labs: WDL   Tele: 100% paced  IV Access/Drips/Fluids: R PIV SL   Drains: NA  Tests: ECHO - EF 55-60%, chest xray - WDL  Consults: Electrophysiology, cardiology, hospitalist   Discharge Plan: Home 9/23  Other: Hx anemia

## 2022-09-23 NOTE — PLAN OF CARE
A&O x4. Pt reported moderate incisional pain, responded well to tylenol. Pt denied dizziness, SOB, or lightheadedness. VSS, on RA. Up w/ SBA, walker + gait belt. Voiding adequately. Tele: AV Paced. Alarms on. Plan for chest xray and discharge home later today.

## 2022-09-25 ENCOUNTER — PATIENT OUTREACH (OUTPATIENT)
Dept: CARE COORDINATION | Facility: CLINIC | Age: 87
End: 2022-09-25

## 2022-09-25 NOTE — PROGRESS NOTES
Clinic Care Coordination Contact  [facility]: Post-Discharge Note  SITUATION                                                      Admission:    Admission Date: 09/21/22      Discharge:   Discharge Date: 09/23/22    BACKGROUND                                                      Per hospital discharge summary and inpatient provider notes:  Benjamin Elizabeth is a 87 year old male with PMH significant for HTN, HLD, chronic anemia and thrombocytopenia and BLE edema who was admitted to [facility] from 9/19-9/21 after presenting to the ED with 3-days of generalized weakness, lightheadedness and gait instability. Work-up in the ED included MRI that demonstrated a lacunar infarct of the right frontal lobe concerning for acute CVA. Neurology was consulted due to concerns for possible CVA. He was seen by Stroke Neurology. MRI's were reviewed with Neuro radiology. Neurology endorsing that the right frontal DWI abnormality seen on MRI is not felt to clearly represent stroke and is favored to be artifact, in addition to there being no clear clinical or radiographic evidence of stroke. No additional stroke work-up was recommended. Patient was resumed on ASA and statin.      In addition, his initial ECG showed Mobitz Type I with telemetry evidence revealing Mobitz Type II heart block. His heart rates have maintained in the 40's even with exertion. Normal TSH. Cardiology was consulted. Echocardiogram showed mild dilated LV but overall normal LV systolic function. Review of ECG and telemetry strips does show sinus bradycardia with a very prolonged MN interval and evidence of Wenckebach Type I Second degree AV block, also with evidence of Type II Second Degree AV block. He is currently experiencing symptoms of dizziness and gait instability. He also was noted to have a very flat troponin elevation that is non-specific without signs of ischemia on ECG. There are no WMA's on echo with coronary angio in 2019 showing only  "mild CAD. Due to evidence of Type II Second Degree AV Block, it was recommended that the patient transfer to [facility] for EP consultation and PPM placement.        ASSESSMENT           Discharge Assessment  How are you doing now that you are home?: \" Things are improving \"  How are your symptoms? (Red Flag symptoms escalate to triage hotline per guidelines): Improved  Do you feel your condition is stable enough to be safe at home until your provider visit?: Yes  Does the patient have their discharge instructions? : Yes  Does the patient have questions regarding their discharge instructions? : No  Were you started on any new medications or were there changes to any of your previous medications? : Yes  Does the patient have all of their medications?: Yes  Do you have questions regarding any of your medications? : No  Do you have all of your needed medical supplies or equipment (DME)?  (i.e. oxygen tank, CPAP, cane, etc.): Yes  Discharge follow-up appointment scheduled within 14 calendar days? : Yes  Discharge Follow Up Appointment Date: 10/04/22  Discharge Follow Up Appointment Scheduled with?: Specialty Care Provider    Post-op ([facility] Only)  If the patient had a surgery or procedure, do they have any questions for a nurse?: No           PLAN                                                      Outpatient Plan:    Follow up with primary care provider, [facility], within 7 days for hospital follow- up.  The following labs/tests are recommended: cbc, bmp in 1 week.          Activity     Your activity upon discharge: activity as tolerated          Diet     Follow this diet upon discharge: Orders Placed This Encounter      Low Saturated Fat Na <2400 mg         Future Appointments   Date Time Provider Department Center   10/4/2022  9:00 AM WAN D[facility]2 [facility]         For any urgent concerns, please contact our 24 hour nurse triage line: [phone] ([phone])         Pinky Cohn, " MA

## 2022-09-28 LAB
ATRIAL RATE - MUSE: 60 BPM
DIASTOLIC BLOOD PRESSURE - MUSE: NORMAL MMHG
INTERPRETATION ECG - MUSE: NORMAL
P AXIS - MUSE: 62 DEGREES
PR INTERVAL - MUSE: 208 MS
QRS DURATION - MUSE: 172 MS
QT - MUSE: 542 MS
QTC - MUSE: 542 MS
R AXIS - MUSE: 263 DEGREES
SYSTOLIC BLOOD PRESSURE - MUSE: NORMAL MMHG
T AXIS - MUSE: 49 DEGREES
VENTRICULAR RATE- MUSE: 60 BPM

## 2022-09-29 ENCOUNTER — APPOINTMENT (OUTPATIENT)
Dept: CT IMAGING | Facility: CLINIC | Age: 87
End: 2022-09-29
Attending: EMERGENCY MEDICINE
Payer: COMMERCIAL

## 2022-09-29 ENCOUNTER — TELEPHONE (OUTPATIENT)
Dept: CARDIOLOGY | Facility: CLINIC | Age: 87
End: 2022-09-29

## 2022-09-29 ENCOUNTER — HOSPITAL ENCOUNTER (OUTPATIENT)
Facility: CLINIC | Age: 87
Setting detail: OBSERVATION
Discharge: HOME | End: 2022-09-30
Attending: EMERGENCY MEDICINE | Admitting: HOSPITALIST
Payer: COMMERCIAL

## 2022-09-29 DIAGNOSIS — I44.1 SECOND DEGREE ATRIOVENTRICULAR BLOCK: ICD-10-CM

## 2022-09-29 DIAGNOSIS — I63.9 CEREBROVASCULAR ACCIDENT (CVA), UNSPECIFIED MECHANISM (H): Primary | ICD-10-CM

## 2022-09-29 DIAGNOSIS — R42 DIZZINESS: ICD-10-CM

## 2022-09-29 DIAGNOSIS — I42.9 SECONDARY CARDIOMYOPATHY (H): ICD-10-CM

## 2022-09-29 LAB
ALBUMIN SERPL BCG-MCNC: 3.1 G/DL (ref 3.5–5.2)
ALBUMIN UR-MCNC: NEGATIVE MG/DL
ALP SERPL-CCNC: 66 U/L (ref 40–129)
ALT SERPL W P-5'-P-CCNC: 10 U/L (ref 10–50)
AMORPH CRY #/AREA URNS HPF: ABNORMAL /HPF
ANION GAP SERPL CALCULATED.3IONS-SCNC: 8 MMOL/L (ref 7–15)
APPEARANCE UR: CLEAR
APTT PPP: 28 SECONDS (ref 22–38)
AST SERPL W P-5'-P-CCNC: 28 U/L (ref 10–50)
BILIRUB DIRECT SERPL-MCNC: <0.2 MG/DL (ref 0–0.3)
BILIRUB SERPL-MCNC: 0.6 MG/DL
BILIRUB UR QL STRIP: NEGATIVE
BUN SERPL-MCNC: 20.9 MG/DL (ref 8–23)
CALCIUM SERPL-MCNC: 8.4 MG/DL (ref 8.8–10.2)
CHLORIDE SERPL-SCNC: 104 MMOL/L (ref 98–107)
COLOR UR AUTO: ABNORMAL
CREAT SERPL-MCNC: 0.91 MG/DL (ref 0.67–1.17)
DEPRECATED HCO3 PLAS-SCNC: 22 MMOL/L (ref 22–29)
ERYTHROCYTE [DISTWIDTH] IN BLOOD BY AUTOMATED COUNT: 14.1 % (ref 10–15)
GFR SERPL CREATININE-BSD FRML MDRD: 83 ML/MIN/1.73M2
GLUCOSE BLDC GLUCOMTR-MCNC: 120 MG/DL (ref 70–99)
GLUCOSE BLDC GLUCOMTR-MCNC: 73 MG/DL (ref 70–99)
GLUCOSE BLDC GLUCOMTR-MCNC: 77 MG/DL (ref 70–99)
GLUCOSE SERPL-MCNC: 81 MG/DL (ref 70–99)
GLUCOSE UR STRIP-MCNC: NEGATIVE MG/DL
HCT VFR BLD AUTO: 41.9 % (ref 40–53)
HGB BLD-MCNC: 13.1 G/DL (ref 13.3–17.7)
HGB UR QL STRIP: NEGATIVE
INR PPP: 1.12 (ref 0.85–1.15)
KETONES UR STRIP-MCNC: NEGATIVE MG/DL
LEUKOCYTE ESTERASE UR QL STRIP: NEGATIVE
MCH RBC QN AUTO: 30.8 PG (ref 26.5–33)
MCHC RBC AUTO-ENTMCNC: 31.3 G/DL (ref 31.5–36.5)
MCV RBC AUTO: 98 FL (ref 78–100)
NITRATE UR QL: NEGATIVE
PH UR STRIP: 6.5 [PH] (ref 5–7)
PLATELET # BLD AUTO: 135 10E3/UL (ref 150–450)
POTASSIUM SERPL-SCNC: 4.9 MMOL/L (ref 3.4–5.3)
PROT SERPL-MCNC: 6.1 G/DL (ref 6.4–8.3)
RBC # BLD AUTO: 4.26 10E6/UL (ref 4.4–5.9)
RBC URINE: 0 /HPF
SARS-COV-2 RNA RESP QL NAA+PROBE: NEGATIVE
SODIUM SERPL-SCNC: 134 MMOL/L (ref 136–145)
SP GR UR STRIP: 1.01 (ref 1–1.03)
TROPONIN T SERPL HS-MCNC: 18 NG/L
UROBILINOGEN UR STRIP-MCNC: NORMAL MG/DL
WBC # BLD AUTO: 5.4 10E3/UL (ref 4–11)
WBC URINE: 0 /HPF

## 2022-09-29 PROCEDURE — C9803 HOPD COVID-19 SPEC COLLECT: HCPCS

## 2022-09-29 PROCEDURE — 70450 CT HEAD/BRAIN W/O DYE: CPT | Mod: XS

## 2022-09-29 PROCEDURE — 99220 PR INITIAL OBSERVATION CARE,LEVEL III: CPT | Performed by: PHYSICIAN ASSISTANT

## 2022-09-29 PROCEDURE — 0042T CT HEAD PERFUSION W CONTRAST: CPT

## 2022-09-29 PROCEDURE — 70496 CT ANGIOGRAPHY HEAD: CPT

## 2022-09-29 PROCEDURE — 250N000009 HC RX 250: Performed by: EMERGENCY MEDICINE

## 2022-09-29 PROCEDURE — 84484 ASSAY OF TROPONIN QUANT: CPT | Performed by: EMERGENCY MEDICINE

## 2022-09-29 PROCEDURE — G0378 HOSPITAL OBSERVATION PER HR: HCPCS

## 2022-09-29 PROCEDURE — 250N000011 HC RX IP 250 OP 636: Performed by: EMERGENCY MEDICINE

## 2022-09-29 PROCEDURE — 85027 COMPLETE CBC AUTOMATED: CPT | Performed by: EMERGENCY MEDICINE

## 2022-09-29 PROCEDURE — 250N000013 HC RX MED GY IP 250 OP 250 PS 637: Performed by: PHYSICIAN ASSISTANT

## 2022-09-29 PROCEDURE — 70498 CT ANGIOGRAPHY NECK: CPT

## 2022-09-29 PROCEDURE — 80053 COMPREHEN METABOLIC PANEL: CPT | Performed by: EMERGENCY MEDICINE

## 2022-09-29 PROCEDURE — 85610 PROTHROMBIN TIME: CPT | Performed by: EMERGENCY MEDICINE

## 2022-09-29 PROCEDURE — 93005 ELECTROCARDIOGRAM TRACING: CPT

## 2022-09-29 PROCEDURE — 82248 BILIRUBIN DIRECT: CPT | Performed by: EMERGENCY MEDICINE

## 2022-09-29 PROCEDURE — 81001 URINALYSIS AUTO W/SCOPE: CPT | Performed by: EMERGENCY MEDICINE

## 2022-09-29 PROCEDURE — 99285 EMERGENCY DEPT VISIT HI MDM: CPT | Mod: CS,25

## 2022-09-29 PROCEDURE — U0005 INFEC AGEN DETEC AMPLI PROBE: HCPCS | Performed by: EMERGENCY MEDICINE

## 2022-09-29 PROCEDURE — 85730 THROMBOPLASTIN TIME PARTIAL: CPT | Performed by: EMERGENCY MEDICINE

## 2022-09-29 PROCEDURE — 36415 COLL VENOUS BLD VENIPUNCTURE: CPT | Performed by: EMERGENCY MEDICINE

## 2022-09-29 PROCEDURE — 82962 GLUCOSE BLOOD TEST: CPT

## 2022-09-29 RX ORDER — ACETAMINOPHEN 650 MG/1
650 SUPPOSITORY RECTAL EVERY 6 HOURS PRN
Status: DISCONTINUED | OUTPATIENT
Start: 2022-09-29 | End: 2022-09-30 | Stop reason: HOSPADM

## 2022-09-29 RX ORDER — BISACODYL 10 MG
10 SUPPOSITORY, RECTAL RECTAL DAILY PRN
Status: DISCONTINUED | OUTPATIENT
Start: 2022-09-29 | End: 2022-09-30 | Stop reason: HOSPADM

## 2022-09-29 RX ORDER — AMOXICILLIN 250 MG
2 CAPSULE ORAL 2 TIMES DAILY PRN
Status: DISCONTINUED | OUTPATIENT
Start: 2022-09-29 | End: 2022-09-30 | Stop reason: HOSPADM

## 2022-09-29 RX ORDER — AMOXICILLIN 250 MG
1 CAPSULE ORAL 2 TIMES DAILY PRN
Status: DISCONTINUED | OUTPATIENT
Start: 2022-09-29 | End: 2022-09-30 | Stop reason: HOSPADM

## 2022-09-29 RX ORDER — ONDANSETRON 2 MG/ML
4 INJECTION INTRAMUSCULAR; INTRAVENOUS EVERY 6 HOURS PRN
Status: DISCONTINUED | OUTPATIENT
Start: 2022-09-29 | End: 2022-09-30 | Stop reason: HOSPADM

## 2022-09-29 RX ORDER — ONDANSETRON 4 MG/1
4 TABLET, ORALLY DISINTEGRATING ORAL EVERY 6 HOURS PRN
Status: DISCONTINUED | OUTPATIENT
Start: 2022-09-29 | End: 2022-09-30 | Stop reason: HOSPADM

## 2022-09-29 RX ORDER — ASPIRIN 81 MG/1
81 TABLET ORAL DAILY
Status: DISCONTINUED | OUTPATIENT
Start: 2022-09-29 | End: 2022-09-30 | Stop reason: HOSPADM

## 2022-09-29 RX ORDER — ATORVASTATIN CALCIUM 40 MG/1
40 TABLET, FILM COATED ORAL EVERY EVENING
Status: DISCONTINUED | OUTPATIENT
Start: 2022-09-29 | End: 2022-09-30 | Stop reason: HOSPADM

## 2022-09-29 RX ORDER — IOPAMIDOL 755 MG/ML
500 INJECTION, SOLUTION INTRAVASCULAR ONCE
Status: COMPLETED | OUTPATIENT
Start: 2022-09-29 | End: 2022-09-29

## 2022-09-29 RX ORDER — ACETAMINOPHEN 325 MG/1
650 TABLET ORAL EVERY 6 HOURS PRN
Status: DISCONTINUED | OUTPATIENT
Start: 2022-09-29 | End: 2022-09-30 | Stop reason: HOSPADM

## 2022-09-29 RX ORDER — POLYETHYLENE GLYCOL 3350 17 G/17G
17 POWDER, FOR SOLUTION ORAL DAILY PRN
Status: DISCONTINUED | OUTPATIENT
Start: 2022-09-29 | End: 2022-09-30 | Stop reason: HOSPADM

## 2022-09-29 RX ADMIN — ASPIRIN 81 MG: 81 TABLET, COATED ORAL at 17:34

## 2022-09-29 RX ADMIN — IOPAMIDOL 125 ML: 755 INJECTION, SOLUTION INTRAVENOUS at 11:41

## 2022-09-29 RX ADMIN — SODIUM CHLORIDE 95 ML: 9 INJECTION, SOLUTION INTRAVENOUS at 11:41

## 2022-09-29 RX ADMIN — LOSARTAN POTASSIUM 37.5 MG: 25 TABLET, FILM COATED ORAL at 20:48

## 2022-09-29 RX ADMIN — ATORVASTATIN CALCIUM 40 MG: 40 TABLET, FILM COATED ORAL at 20:48

## 2022-09-29 ASSESSMENT — ACTIVITIES OF DAILY LIVING (ADL)
ADLS_ACUITY_SCORE: 35
ADLS_ACUITY_SCORE: 33
ADLS_ACUITY_SCORE: 35
ADLS_ACUITY_SCORE: 35
ADLS_ACUITY_SCORE: 33
ADLS_ACUITY_SCORE: 33

## 2022-09-29 NOTE — CONSULTS
[facility]    Stroke Telephone Note    I was called by Javi Rodriguez on 09/29/22 regarding patient Benjamin Elizabeth. The patient is a 87 year old male with pertinent past medical history of recent admission about 2 weeks ago for possible stroke (symptoms dizziness and gait instability), upon review of my colleague, Alisa Doan' note the MRI DWI change to deep right frontal white matter was reviewed with neuroradiology and felt not to be acute infarct. 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    He presents to [facility] ED today for evaluation. He was LKW before bed last night then woke up at 0630 this AM with dizziness (similar symptoms at prior presentation), also has R facial pain. /76.    Stroke Code Data (for stroke code without tele)  Stroke code activated 09/29/22   1130   Stroke provider first response  09/29/22   1133            Last known normal 09/28/22   (S)  (last night before bed)        Time of discovery   (or onset of symptoms) 09/29/22   0630   Head CT read by Stroke Neuro Dr/Provider 09/29/22   1148   Was stroke code de-escalated? Yes 09/29/22 1205          Imaging Findings   CTH:  1. No evidence of acute intracranial hemorrhage, mass, or herniation.  2. Mild diffuse parenchymal volume loss and white matter changes most  likely due to chronic microvascular ischemic disease.  3. 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  CTA head and neck:   1. Patent arteries in the neck without evidence of dissection. Mild  atherosclerotic disease in the carotid arteries bilaterally without  significant stenosis by NASCET criteria.  2. Patent proximal major intracranial arteries without vascular  cutoff. No significant stenosis. No aneurysm identified.  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), the lacunars suspected due to vascular risk factors/small vessel disease, suspect possible underlying atrial fibrillation    Recommendations   -admit for observation  -interrogate PPM to look for Afib or other concerning arrhythmia that could be etiology of recurrent dizziness, if atrial fibrillation found would need to discuss anticoagulation  -repeat CTH in 24 hours to look for any areas of infarct if unable to get MRI, please call stroke neurology once resulted and we can see via telemedicine video consult   -continue PTA ASA for now pending work-up as unclear etiology  -r/o metabolic, infectious, toxic etiologies that could cause prior cerebellar stroke recrudescence    My recommendations are based on the information provided over the phone by Benjamin Elizabeth's in-person providers. They are not intended to replace the clinical judgment of his in-person providers. I was not requested to personally see or examine the patient at this time.    Brittanie Cedeno PA-C  Vascular Neurology  To page me or covering stroke neurology  "team member, click here: AMCOM   Choose \"On Call\" tab at top, then search dropdown box for \"Neurology Adult\", select location, press Enter, then look for stroke/neuro ICU/telestroke.      "

## 2022-09-29 NOTE — PHARMACY-ADMISSION MEDICATION HISTORY
Admission medication history interview status for this patient is complete. See [facility] admission navigator for allergy information, prior to admission medications and immunization status.     Medication history interview was completed by [facility] 9/29/22    Prior to Admission medications    Medication Sig Last Dose Taking? Auth Provider Long Term End Date   aspirin 81 MG EC tablet Take 81 mg by mouth daily 9/28/2022 at Unknown time Yes Unknown, Entered By History     atorvastatin (LIPITOR) 40 MG tablet Take 40 mg by mouth daily 9/28/2022 at Unknown time Yes Unknown, Entered By History Yes    losartan (COZAAR) 25 MG tablet Take 37.5 mg by mouth daily 9/28/2022 at Unknown time Yes Unknown, Entered By History Yes    multivitamin, therapeutic (THERA-VIT) TABS tablet Take 1 tablet by mouth daily 9/28/2022 at Unknown time Yes Unknown, Entered By History     cyanocobalamin (CYANOCOBALAMIN) 1000 MCG/ML injection Inject 1 mL (1,000 mcg) into the muscle every 7 days Not started  Shira Julian MD

## 2022-09-29 NOTE — H&P
[facility]    History and Physical - Hospitalist Service       Date of Admission:  9/29/2022    Assessment & Plan      Benjamin Elizabeth is a 87 year old male with past medical history significant for HTN, HLD, chronic anemia and BLE edema who was admitted on 9/29/2022 for further evaluation of gait instability.    Of note, patient was admitted for possible stroke between 9/19-9/21 after he experienced symptoms of dizziness and gait instability. At that time, imaging notable for an MRI that demonstrated a lacunar infarct of the right frontal lobe concerning for acute CVA. He was seen by Stroke Neurology. MRI's were reviewed with neuro radiology where neurology endorsed that the right frontal DWI abnormality seen on MRI was not felt to clearly represent stroke and was favored to be artifact, in addition to there being no clear clinical or radiographic evidence of stroke. No additional stroke work-up was recommended. Patient was resumed on ASA and statin. In addition, initial ECG at that time showed Mobitz Type I with telemetry evidence revealing Mobitz Type II heart block. His heart rates maintained in the 40's even with exertion. Cardiology was consulted and patient had permanent pacemaker placed at [facility].     In the ED, patient is hemodynamically stable. CXR unremarkable. Likewise, CTA, CTH and CTP images unremarkable. EKG demonstrated AV dual-paced rhythm. CBC demonstrated a hgb of 13.1, RBC count of 4.26 and PLT count of 135, improved from one week ago. CMP notable for a slight hyponatremia of 134 and hypocalcemia of 8.4. Troponin WNL. INR and PTT WNL. UA normal other than a few amorphous crystals. Patient is COVID negative. Interrogation of pacer negative for evidence of arrhythmia, please see note from cardiology 9/29.     #Gait instability   #Right Frontal DWI Abnormality (Seen on MRI 9/19/2022) likely Artifact   - Patient was recently admittedfor dizziness and gait instability where he  was found to have possible stroke and also a heart block requiring placement of a permanent pacemaker. He states that following his discharge two weeks ago, he has overall been feeling improved following his pacemaker placement. Patient reports he's been able to walk increasing distances and has had no recurrence of gait instability until this morning when he awoke at 0630 and had difficulty rising from a sitting position while trying to get out of bed. He states this sensation of instability while raising from a sitting position has continued throughout the day, which is why he presented to the ED. He denies room spinning dizziness but describes it more as instability while trying to stand   - MRI images from 9/19 were reviewed and the right frontal DWI abnormality is not felt to clearly represent stroke and favored to be artifact, per neurology   - Continue ASA 81 mg and Atorvastatin 40 mg daily  - PT consult   - Telemetry   - Fall risk   - Orthostatic blood pressures this afternoon   - Repeat CTH tomorrow morning to look for any areas of infarct, per neurology, as patient is unable to receive MRI given recently implanted pacemaker    - Neurology following     #Hypertension  #Hyperlipidemia  #CAD  - Denies chest pain, palpitations or SOB today   - Coronary angio in 2019 showing mild CAD  - Please see note from cardiology 9/29 of interrogation (patient noted to be in sinus rhythm with no arrhythmia noted)  - Continue PTA regimen: Losartan 37.5 mg and Atorvastatin 40 mg daily (if BPs low, consider adjusting losartan)   - Orthostatic blood pressures, per above  - Telemetry monitoring, per above     #Chronic Anemia   #Chronic Thrombocytopenia  #Vitamin B12 deficiency  - Has followed with Heme/Onc OP with anemia noted in June 2016  - B12 level at 166 one week ago. Started on 1000 mcg vitamin B12 injections weekly for up to 6 doses followed by repeat check. Check B12 today   - Hgb 13.1, improved from 11.9 9/22  - PLTs  135, increased from 107 9/22  - Continue to monitor with repeat CBC tomorrow a.m.      Diet: Regular Diet Adult    DVT Prophylaxis: Pneumatic Compression Devices  Santos Catheter: Not present  Central Lines: None  Cardiac Monitoring: ACTIVE order. Indication: Syncope- high cardiac risk (48 hours)  Code Status: Full Code    Disposition Plan  Discharge in 1-2 days pending improvement in clinical presentation.      Expected Discharge Date: 09/30/2022                The patient's care was discussed with the Patient.  Pt seen and examined and plan as outlined as above.       DIVYA AmbrizS   Seen in conjunction with Antonia Dean PA-C   Hospitalist Service  [facility]  Securely message with the Vocera Web Console (learn more here)  Text page via [facility] Paging/Directory   ______________________________________________________________________    Chief Complaint   Gait instability     History is obtained from the patient    History of Present Illness   Benjamin Elizabeth is a 87 year old male with past medical history significant for HTN, HLD, chronic anemia and BLE edema who was admitted on 9/29/2022 for further evaluation of gait instability.    Patient states he was recently admitted several weeks ago for dizziness and gait instability where he was found to have possible stroke and also a heart block requiring placement of a permanent pacemaker. He states that following his discharge two weeks ago, he has overall been feeling improved following his pacemaker implant. He states he's been able to walk increasing distances and has had no recurrence of gait instability until this morning when he awoke at 0630 and had difficulty rising from a sitting position while trying to get out of bed. He states this sensation of instability while raising from a sitting position has continued throughout the day, which is why he presented to the ED. He denies room spinning dizziness but describes it  more as instability while trying to stand. He denies other focal abnormalities. No facial pain/droop, extremity weakness, confusion, word finding difficulty, changes in vision/hearing, decreased sensation, N/V, abdominal pain, chest pain, palpitations, sob or changes to bowel movements. He does endorse increased urinary frequency over the past week, however, denies any other urinary symptoms such as dysuria or hematuria. No recent fever or chills. No exposure to sick contacts. No recent falls or head trauma as a result of his gait instability. He has been eating and drinking well over the past several weeks with no recent alcohol or recreational drug use. He denies recent changes to his medications or double doses. He continues to take his ASA 81 mg daily. He denies being on additional blood thinners. No additional concerns or complaints at this time.     Review of Systems    The 10 point Review of Systems is negative other than noted in the HPI or here.     Past Medical History    I have reviewed this patient's medical history and updated it with pertinent information if needed.   No past medical history on file.    Past Surgical History   I have reviewed this patient's surgical history and updated it with pertinent information if needed.  Past Surgical History:   Procedure Laterality Date     EP PACEMAKER DEVICE & LEAD IMPLANT- RIGHT ATRIAL & LEFT VENTRICULAR N/A 9/22/2022    Procedure: Pacemaker Device & Lead Implant- Right Atrial & Left Ventricular;  Surgeon: Blaze Cain MD;  Location:  [facility]       Social History   I have reviewed this patient's social history and updated it with pertinent information if needed.     Family History     No significant family history.    Prior to Admission Medications   Prior to Admission Medications   Prescriptions Last Dose Informant Patient Reported? Taking?   aspirin 81 MG EC tablet 9/28/2022 at Unknown time  Yes Yes   Sig: Take 81 mg by mouth daily  (with dinner)   atorvastatin (LIPITOR) 40 MG tablet 9/28/2022 at Unknown time  Yes Yes   Sig: Take 40 mg by mouth daily (with dinner)   cyanocobalamin (CYANOCOBALAMIN) 1000 MCG/ML injection Not started  No No   Sig: Inject 1 mL (1,000 mcg) into the muscle every 7 days   losartan (COZAAR) 25 MG tablet 9/28/2022 at Unknown time  Yes Yes   Sig: Take 37.5 mg by mouth daily (with dinner)   multivitamin, therapeutic (THERA-VIT) TABS tablet 9/28/2022 at Unknown time  Yes Yes   Sig: Take 1 tablet by mouth daily (with dinner)      Facility-Administered Medications: None     Allergies   No Known Allergies    Physical Exam   Vital Signs: Temp: 97.8  F (36.6  C) Temp src: Axillary BP: (!) 166/75 Pulse: 64   Resp: 18 SpO2: 99 % O2 Device: None (Room air)    Weight: 194 lbs 0 oz    GENERAL:  Pleasant, cooperative, alert. Well developed, well nourished. Sitting comfortably in bed upon examination.   HEENT: Normocephalic, atraumatic. Extra occular mm intact.  Sclera clear. PERRL. Mild wax in bilateral ear canals without occlusion. Mucous membranes moist. No erythema; uvula midline. Neck supple with no adenopathy.   PULMONOLOGY: Clear to auscultation bilaterally with good exchange at the bases and no wheeze or crackle.  CARDIAC: Regular rate and rhythm (AV paced). No appreciated murmur.   ABDOMEN: Soft, nontender non distended.   MUSCULOSKELETAL:  Moving x 4 spontaneously with CMS intact x4.  Normal bulk and tone. 2+ pitting LE edema bilaterally.     NEURO: Alert and oriented x3. CN II-XII grossly intact and symmetric. Nonfocal exam. Rhomberg negative. Heel-to-shin negative. Rapid alternating movements intact.     Data   Data reviewed today: I reviewed all medications, new labs and imaging results over the last 24 hours.     Recent Labs   Lab 09/29/22  1236 09/29/22  1211 09/29/22  1131 09/29/22  1124 09/22/22  1859 09/22/22  1858   WBC  --   --  5.4  --   --   --    HGB  --   --  13.1*  --   --  11.9*   MCV  --   --  98  --   --    --    PLT  --   --  135*  --   --   --    INR  --  1.12  --   --   --   --    *  --   --   --  139  --    POTASSIUM 4.9  --   --   --  4.0  --    CHLORIDE 104  --   --   --  110*  --    CO2 22  --   --   --  24  --    BUN 20.9  --   --   --  21  --    CR 0.91  --   --   --  0.94  --    ANIONGAP 8  --   --   --  5  --    ERASTO 8.4*  --   --   --  8.2*  --    GLC 81  --   --  77 113*  --    ALBUMIN 3.1*  --   --   --   --   --    PROTTOTAL 6.1*  --   --   --   --   --    BILITOTAL 0.6  --   --   --   --   --    ALKPHOS 66  --   --   --   --   --    ALT 10  --   --   --   --   --    AST 28  --   --   --   --   --      Most Recent 3 CBC's:Recent Labs   Lab Test 09/29/22  1131 09/22/22  1858 09/22/22  0625 09/21/22  0642   WBC 5.4  --  6.6 5.4   HGB 13.1* 11.9* 11.7* 11.8*   MCV 98  --  97 96   *  --  107* 118*     Most Recent 3 BMP's:Recent Labs   Lab Test 09/29/22  1236 09/29/22  1124 09/22/22  1859 09/22/22  1805 09/22/22  0625   *  --  139  --  143   POTASSIUM 4.9  --  4.0  --  3.9   CHLORIDE 104  --  110*  --  113*   CO2 22  --  24  --  25   BUN 20.9  --  21  --  22   CR 0.91  --  0.94  --  0.95   ANIONGAP 8  --  5  --  5   ERASTO 8.4*  --  8.2*  --  8.2*   GLC 81 77 113*   < > 86    < > = values in this interval not displayed.     Most Recent 3 INR's:Recent Labs   Lab Test 09/29/22  1211 09/21/22  0642   INR 1.12 1.07     5.4    \    13.1 (L)    /    135 (L)   N N/A    L N/A    134 (L)    104    20.9 /   ------------------------------------ 81   ALT 10   AST 28   AP 66   ALB 3.1 (L)   Ca 8.4 (L)  4.9    22    0.91 \    % RETIC N/A    LDH N/A  Troponin N/A    BNP N/A    CK N/A  INR 1.12   PTT 28    D-dimer N/A    Fibrinogen N/A    Antithrombin N/A  Ferritin N/A  CRP N/A    IL-6 N/A  Recent Results (from the past 24 hour(s))   CT Head w/o Contrast    Narrative    CT SCAN OF THE HEAD WITHOUT CONTRAST   9/29/2022 11:46 AM     HISTORY: Code stroke tier 2. Dizziness.    TECHNIQUE:  Axial images of the  head and coronal reformations without  IV contrast material. Radiation dose for this scan was reduced using  automated exposure control, adjustment of the mA and/or kV according  to patient size, or iterative reconstruction technique.    COMPARISON: Brain MR 9/19/2022.    FINDINGS: No acute intracranial hemorrhage. No mass effect or midline  shift. Mild diffuse parenchymal volume loss. Patchy periventricular  white matter hypodensities are nonspecific, but most likely related to  chronic microvascular ischemic disease. Cortical hypodensities in the  left middle frontal lobe gyrus and left parietal lobe are compatible  with previous cortical infarcts. Ventricular size is within normal  limits without evidence of hydrocephalus.    Small left mastoid effusion. The bony calvarium and bones of the skull  base appear intact.       Impression    IMPRESSION:     1. No evidence of acute intracranial hemorrhage, mass, or herniation.  2. Mild diffuse parenchymal volume loss and white matter changes most  likely due to chronic microvascular ischemic disease.  3. 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  4. Evaluation for acute ischemia would be better assessed with brain  MRI.      GAIL ESPINAL MD         SYSTEM ID:  [id number]   CTA Head Neck w Contrast    Narrative    CT ANGIOGRAM OF THE HEAD AND NECK WITH CONTRAST  CT HEAD PERFUSION WITH CONTRAST September 29, 2022 11:49 AM     HISTORY: Code stroke tier 2. Dizziness.    TECHNIQUE: CT angiography with an injection of 75mL Isovue-370  (accession [id number]), 50mL Isovue-370 (accession [id number]) IV with  scans through the head and neck. Images were transferred to a separate  3-D workstation where multiplanar reformations and 3-D images were  created. Estimates of carotid stenoses are made relative to the distal  internal carotid artery diameters except as noted. Radiation dose for  this scan was  reduced using automated exposure control, adjustment of  the mA and/or kV according to patient size, or iterative  reconstruction technique.     Perfusion scans were performed with injection of additional IV  contrast. These images were processed on a separate 3-D workstation.     COMPARISON: MRA head and neck 9/19/2022.     CT HEAD FINDINGS: No contrast enhancing lesions. CT perfusion images  of the head are unremarkable.     CT ANGIOGRAM HEAD FINDINGS: The major intracranial arteries including  the proximal branches of the anterior cerebral, middle cerebral, and  posterior cerebral arteries appear patent without vascular cutoff. No  aneurysm identified. No significant stenosis. Venous circulation is  unremarkable.     Small vertebrobasilar system with patent bilateral posterior  communicating arteries supplying the PCA territories.    CT ANGIOGRAM NECK FINDINGS: Normal origin of the great vessels from  the aortic arch.     Right carotid artery: The right common and internal carotid arteries  are patent. Mild atherosclerotic disease at the carotid bifurcation  and proximal internal carotid artery without stenosis.     Left carotid artery: The left common and internal carotid arteries are  patent. Mild atherosclerotic disease at the carotid bifurcation and  proximal internal carotid artery without stenosis.     Vertebral arteries: Vertebral arteries are patent without evidence of  dissection. No significant stenosis. Right vertebral artery appears to  terminate primarily in the right PICA.    Other findings: Multilevel degenerative changes throughout the spine.       Impression    IMPRESSION:   1. Patent arteries in the neck without evidence of dissection. Mild  atherosclerotic disease in the carotid arteries bilaterally without  significant stenosis by NASCET criteria.  2. Patent proximal major intracranial arteries without vascular  cutoff. No significant stenosis. No aneurysm identified.  3. Unremarkable CT  perfusion images of the head.    Results discussed with Javi Rodriguez at 12:00 PM on 9/29/2022.     GAIL ESPINAL MD         SYSTEM ID:  [id number]   CT Head Perfusion w Contrast    Narrative    CT ANGIOGRAM OF THE HEAD AND NECK WITH CONTRAST  CT HEAD PERFUSION WITH CONTRAST September 29, 2022 11:49 AM     HISTORY: Code stroke tier 2. Dizziness.    TECHNIQUE: CT angiography with an injection of 75mL Isovue-370  (accession [id number]), 50mL Isovue-[id number] (accession [id number]) IV with  scans through the head and neck. Images were transferred to a separate  3-D workstation where multiplanar reformations and 3-D images were  created. Estimates of carotid stenoses are made relative to the distal  internal carotid artery diameters except as noted. Radiation dose for  this scan was reduced using automated exposure control, adjustment of  the mA and/or kV according to patient size, or iterative  reconstruction technique.     Perfusion scans were performed with injection of additional IV  contrast. These images were processed on a separate 3-D workstation.     COMPARISON: MRA head and neck 9/19/2022.     CT HEAD FINDINGS: No contrast enhancing lesions. CT perfusion images  of the head are unremarkable.     CT ANGIOGRAM HEAD FINDINGS: The major intracranial arteries including  the proximal branches of the anterior cerebral, middle cerebral, and  posterior cerebral arteries appear patent without vascular cutoff. No  aneurysm identified. No significant stenosis. Venous circulation is  unremarkable.     Small vertebrobasilar system with patent bilateral posterior  communicating arteries supplying the PCA territories.    CT ANGIOGRAM NECK FINDINGS: Normal origin of the great vessels from  the aortic arch.     Right carotid artery: The right common and internal carotid arteries  are patent. Mild atherosclerotic disease at the carotid bifurcation  and proximal internal carotid artery without stenosis.     Left carotid artery: The left  common and internal carotid arteries are  patent. Mild atherosclerotic disease at the carotid bifurcation and  proximal internal carotid artery without stenosis.     Vertebral arteries: Vertebral arteries are patent without evidence of  dissection. No significant stenosis. Right vertebral artery appears to  terminate primarily in the right PICA.    Other findings: Multilevel degenerative changes throughout the spine.       Impression    IMPRESSION:   1. Patent arteries in the neck without evidence of dissection. Mild  atherosclerotic disease in the carotid arteries bilaterally without  significant stenosis by NASCET criteria.  2. Patent proximal major intracranial arteries without vascular  cutoff. No significant stenosis. No aneurysm identified.  3. Unremarkable CT perfusion images of the head.    Results discussed with Javi Rodriguez at 12:00 PM on 9/29/2022.     GAIL ESPINAL MD         SYSTEM ID:  [id number]

## 2022-09-29 NOTE — ED NOTES
[facility]  ED Nurse Handoff Report    Benjamin Elizabeth is a 87 year old male   ED Chief complaint: Dizziness and Facial Pain  . ED Diagnosis:   Final diagnoses:   Dizziness     Allergies: No Known Allergies    Code Status: Full Code  Activity level - Baseline/Home:  Independent. Activity Level - Current:   Stand by Assist. Lift room needed: No. Bariatric: No   Needed: No   Isolation: No. Infection: Not Applicable.     Vital Signs:   Vitals:    09/29/22 1215 09/29/22 1230 09/29/22 1245 09/29/22 1315   BP: (!) 160/72 (!) 162/76 (!) 159/72 (!) 158/73   Pulse: 60 61 60 61   Resp:       Temp:       TempSrc:       SpO2: 100% 99% 98% 98%       Cardiac Rhythm:  ,      Pain level:    Patient confused: No. Patient Falls Risk: Yes.   Elimination Status: Has voided   Patient Report - Initial Complaint: dizziness. Focused Assessment: Pt woke up with dizziness between 6-7am and right-sided facial pain. Pacemaker placed one week ago, admitted for stroke two weeks ago. Slurred speech, but wife states this is normal. States he is not taking any blood thinners. ABCs intact. A&OX4.    Tests Performed: labs, imaging. Abnormal Results:   Labs Ordered and Resulted from Time of ED Arrival to Time of ED Departure   CBC WITH PLATELETS - Abnormal       Result Value    WBC Count 5.4      RBC Count 4.26 (*)     Hemoglobin 13.1 (*)     Hematocrit 41.9      MCV 98      MCH 30.8      MCHC 31.3 (*)     RDW 14.1      Platelet Count 135 (*)    BASIC METABOLIC PANEL - Abnormal    Sodium 134 (*)     Potassium 4.9      Chloride 104      Carbon Dioxide (CO2) 22      Anion Gap 8      Urea Nitrogen 20.9      Creatinine 0.91      Calcium 8.4 (*)     Glucose 81      GFR Estimate 83     HEPATIC FUNCTION PANEL - Abnormal    Protein Total 6.1 (*)     Albumin 3.1 (*)     Bilirubin Total 0.6      Alkaline Phosphatase 66      AST 28      ALT 10      Bilirubin Direct <0.20     ROUTINE UA WITH MICROSCOPIC REFLEX TO CULTURE - Abnormal     Color Urine Light Yellow      Appearance Urine Clear      Glucose Urine Negative      Bilirubin Urine Negative      Ketones Urine Negative      Specific Gravity Urine 1.010      Blood Urine Negative      pH Urine 6.5      Protein Albumin Urine Negative      Urobilinogen Urine Normal      Nitrite Urine Negative      Leukocyte Esterase Urine Negative      Amorphous Crystals Urine Few (*)     RBC Urine 0      WBC Urine 0     PARTIAL THROMBOPLASTIN TIME - Normal    aPTT 28     INR - Normal    INR 1.12     TROPONIN T, HIGH SENSITIVITY - Normal    Troponin T, High Sensitivity 18     GLUCOSE BY METER - Normal    GLUCOSE BY METER POCT 77     GLUCOSE MONITOR NURSING POCT     CT Head Perfusion w Contrast   Final Result   IMPRESSION:    1. Patent arteries in the neck without evidence of dissection. Mild   atherosclerotic disease in the carotid arteries bilaterally without   significant stenosis by NASCET criteria.   2. Patent proximal major intracranial arteries without vascular   cutoff. No significant stenosis. No aneurysm identified.   3. Unremarkable CT perfusion images of the head.      Results discussed with Javi Rodriguez at 12:00 PM on 9/29/2022.       GAIL ESPINAL MD            SYSTEM ID:  [id number]      CTA Head Neck w Contrast   Final Result   IMPRESSION:    1. Patent arteries in the neck without evidence of dissection. Mild   atherosclerotic disease in the carotid arteries bilaterally without   significant stenosis by NASCET criteria.   2. Patent proximal major intracranial arteries without vascular   cutoff. No significant stenosis. No aneurysm identified.   3. Unremarkable CT perfusion images of the head.      Results discussed with Javi Rodriguez at 12:00 PM on 9/29/2022.       GAIL ESPINAL MD            SYSTEM ID:  [id number]      CT Head w/o Contrast   Final Result   IMPRESSION:      1. No evidence of acute intracranial hemorrhage, mass, or herniation.   2. Mild diffuse parenchymal volume loss and white  matter changes most   likely due to chronic microvascular ischemic disease.   3. Previous cortical infarcts in the left middle frontal lobe gyrus   and left parietal lobe, also seen on brain MR 9/19/2022.   4. Evaluation for acute ischemia would be better assessed with brain   MRI.         GAIL ESPINAL MD            SYSTEM ID:  [id number]        .   Treatments provided: see MAR  Family Comments: N/A  OBS brochure/video discussed/provided to patient:  Yes  ED Medications:   Medications   CT Scan Flush (95 mLs Intravenous Given 9/29/22 1141)   iopamidol (ISOVUE-370) solution 500 mL (125 mLs Intravenous Given 9/29/22 1141)     Drips infusing:  No  For the majority of the shift, the patient's behavior Green. Interventions performed were N/A.    Sepsis treatment initiated: No     Patient tested for COVID 19 prior to admission: YES    ED Nurse Name/Phone Number: Bobby White RN,   2:11 PM    RECEIVING UNIT ED HANDOFF REVIEW    Above ED Nurse Handoff Report was reviewed: Yes  Reviewed by: Davida Gibbons RN on September 29, 2022 at 2:44 PM

## 2022-09-29 NOTE — ED PROVIDER NOTES
History     Chief Complaint:  Dizziness and Facial Pain       HPI   Benjamin Elizabeth is a 87 year old male who presents for evaluation of dizziness.  He presents with his wife.  He woke up this morning about 6 6:30 AM and immediately noticed that he was quite dizzy.  He had difficulty walking and cannot walk secondary to the dizziness.  He also has a pain in the right frontal forehead area.  No vision changes or eye pain.  He has no numbness or weakness of extremities.  He reports he recently had a stroke and was transferred to [facility] after presentation here.  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  No pain or swelling of the insertion site.  He states the symptoms are similar to when he had a stroke last time.    ROS:  Review of Systems   Constitutional: Negative for chills and fever.   Eyes: Negative for visual disturbance.   Respiratory: Negative for cough.    Cardiovascular: Negative for chest pain.   Gastrointestinal: Negative for abdominal pain, diarrhea, nausea and vomiting.   Neurological: Positive for dizziness and headaches. Negative for speech difficulty, weakness and numbness.   Psychiatric/Behavioral: Negative for confusion.   All other systems reviewed and are negative.        Allergies:  No Known Allergies     Medications:    No current outpatient medications on file.      Past Medical History:    No past medical history on file.    Past Surgical History:    Past Surgical History:   Procedure Laterality Date     EP PACEMAKER DEVICE & LEAD IMPLANT- RIGHT ATRIAL & LEFT VENTRICULAR N/A 9/22/2022    Procedure: Pacemaker Device & Lead Implant- Right Atrial & Left Ventricular;  Surgeon: Blaze Cain MD;  Location:  [facility] CATH LAB        Family History:    family history is not on file.    Social History:   Presents with wife  PCP: Clinic, [facility]     Physical Exam     Patient Vitals for the past 24 hrs:   BP Temp Temp src Pulse  "Resp SpO2 Height Weight   09/29/22 1605 (!) 166/75 97.8  F (36.6  C) Axillary 64 18 99 % 1.854 m (6' 1\") 88 kg (194 lb)   09/29/22 1550 (!) 149/94 -- -- 62 -- 100 % -- --   09/29/22 1315 (!) 158/73 -- -- 61 -- 98 % -- --   09/29/22 1245 (!) 159/72 -- -- 60 -- 98 % -- --   09/29/22 1230 (!) 162/76 -- -- 61 -- 99 % -- --   09/29/22 1215 (!) 160/72 -- -- 60 -- 100 % -- --   09/29/22 1200 (!) 168/76 -- -- 64 -- 100 % -- --   09/29/22 1145 (!) 158/69 -- -- 65 18 98 % -- --   09/29/22 1130 (!) 159/74 -- -- 64 -- 97 % -- --   09/29/22 1128 (!) 165/79 97.8  F (36.6  C) Oral 66 18 100 % -- --        Physical Exam  Constitutional: Well appearing.  HEENT: Atraumatic.  PERRL.  EOMI.  Moist mucous membranes.  Neck: Soft.  Supple.   Cardiac: Regular rate and rhythm.  No murmur or rub.  Respiratory: Clear to auscultation bilaterally.  No respiratory distress.   Abdomen: Soft and nontender.  Nondistended.  Musculoskeletal: No edema.  Normal range of motion.  Neurologic: Alert and oriented x3.  Normal tone and bulk.  No facial drooping. Speech slurred but baseline per wife and patient.  5/5 strength in bilateral upper and lower extremities.  Sensation to light touch intact throughout.   Skin: No rashes.  No edema.  Psych: Normal affect.  Normal behavior.        Emergency Department Course   ECG:  ECG results from 09/29/22   EKG 12-lead, tracing only     Value    Systolic Blood Pressure     Diastolic Blood Pressure     Ventricular Rate 63    Atrial Rate 63    NY Interval 206    QRS Duration 174        QTc 528    P Axis     R AXIS 270    T Axis 68    Interpretation ECG      AV dual-paced rhythm  Abnormal ECG  When compared with ECG of 22-SEP-2022 18:32,  Vent. rate has increased BY   3 BPM         Imaging:  CT Head Perfusion w Contrast   Final Result   IMPRESSION:    1. Patent arteries in the neck without evidence of dissection. Mild   atherosclerotic disease in the carotid arteries bilaterally without   significant stenosis by " NASCET criteria.   2. Patent proximal major intracranial arteries without vascular   cutoff. No significant stenosis. No aneurysm identified.   3. Unremarkable CT perfusion images of the head.      Results discussed with Javi Rodriguez at 12:00 PM on 9/29/2022.       GAIL ESPINAL MD            SYSTEM ID:  [id number]      CTA Head Neck w Contrast   Final Result   IMPRESSION:    1. Patent arteries in the neck without evidence of dissection. Mild   atherosclerotic disease in the carotid arteries bilaterally without   significant stenosis by NASCET criteria.   2. Patent proximal major intracranial arteries without vascular   cutoff. No significant stenosis. No aneurysm identified.   3. Unremarkable CT perfusion images of the head.      Results discussed with Javi Rodriguez at 12:00 PM on 9/29/2022.       GAIL ESPINAL MD            SYSTEM ID:  [id number]      CT Head w/o Contrast   Final Result   IMPRESSION:      1. No evidence of acute intracranial hemorrhage, mass, or herniation.   2. Mild diffuse parenchymal volume loss and white matter changes most   likely due to chronic microvascular ischemic disease.   3. Previous cortical infarcts in the left middle frontal lobe gyrus   and left parietal lobe, also seen on brain MR 9/19/2022.   4. Evaluation for acute ischemia would be better assessed with brain   MRI.         GAIL ESPINAL MD            SYSTEM ID:  [id number]         Report per radiology    Laboratory:  Labs Ordered and Resulted from Time of ED Arrival to Time of ED Departure   CBC WITH PLATELETS - Abnormal       Result Value    WBC Count 5.4      RBC Count 4.26 (*)     Hemoglobin 13.1 (*)     Hematocrit 41.9      MCV 98      MCH 30.8      MCHC 31.3 (*)     RDW 14.1      Platelet Count 135 (*)    BASIC METABOLIC PANEL - Abnormal    Sodium 134 (*)     Potassium 4.9      Chloride 104      Carbon Dioxide (CO2) 22      Anion Gap 8      Urea Nitrogen 20.9      Creatinine 0.91      Calcium 8.4 (*)     Glucose 81       GFR Estimate 83     HEPATIC FUNCTION PANEL - Abnormal    Protein Total 6.1 (*)     Albumin 3.1 (*)     Bilirubin Total 0.6      Alkaline Phosphatase 66      AST 28      ALT 10      Bilirubin Direct <0.20     ROUTINE UA WITH MICROSCOPIC REFLEX TO CULTURE - Abnormal    Color Urine Light Yellow      Appearance Urine Clear      Glucose Urine Negative      Bilirubin Urine Negative      Ketones Urine Negative      Specific Gravity Urine 1.010      Blood Urine Negative      pH Urine 6.5      Protein Albumin Urine Negative      Urobilinogen Urine Normal      Nitrite Urine Negative      Leukocyte Esterase Urine Negative      Amorphous Crystals Urine Few (*)     RBC Urine 0      WBC Urine 0     PARTIAL THROMBOPLASTIN TIME - Normal    aPTT 28     INR - Normal    INR 1.12     TROPONIN T, HIGH SENSITIVITY - Normal    Troponin T, High Sensitivity 18     GLUCOSE BY METER - Normal    GLUCOSE BY METER POCT 77     COVID-19 VIRUS (CORONAVIRUS) BY PCR - Normal    SARS CoV2 PCR Negative          Procedures       Emergency Department Course:             Reviewed:  I reviewed nursing notes, vitals and past medical history        Interventions:  Medications   aspirin EC tablet 81 mg (has no administration in time range)   atorvastatin (LIPITOR) tablet 40 mg (has no administration in time range)   losartan (COZAAR) half-tab 37.5 mg (has no administration in time range)   Medication Instructions - Avoid dextrose in IV solutions. (has no administration in time range)   medication instruction - No oral meds if patient didn't pass dysphagia screen (has no administration in time range)   acetaminophen (TYLENOL) tablet 650 mg (has no administration in time range)     Or   acetaminophen (TYLENOL) Suppository 650 mg (has no administration in time range)   melatonin tablet 1 mg (has no administration in time range)   senna-docusate (SENOKOT-S/PERICOLACE) 8.6-50 MG per tablet 1 tablet (has no administration in time range)     Or   senna-docusate  (SENOKOT-S/PERICOLACE) 8.6-50 MG per tablet 2 tablet (has no administration in time range)   polyethylene glycol (MIRALAX) Packet 17 g (has no administration in time range)   bisacodyl (DULCOLAX) suppository 10 mg (has no administration in time range)   ondansetron (ZOFRAN ODT) ODT tab 4 mg (has no administration in time range)     Or   ondansetron (ZOFRAN) injection 4 mg (has no administration in time range)   CT Scan Flush (95 mLs Intravenous Given 9/29/22 1141)   iopamidol (ISOVUE-370) solution 500 mL (125 mLs Intravenous Given 9/29/22 1141)        Disposition:  The patient was discharged to home.     Impression & Plan      Medical Decision Making:  Benjamin Elizabeth is an 87-year-old man who is afebrile and he is hemodynamically stable.  Given the wake-up stroke, tier 2 code stroke was initiated.  CT head and CTA of the head revealed no acute abnormalities.  Stroke a stroke neurology.  Review of the stroke notes.  Reveal discussion of the MRI in the right frontal lobe findings were artifact and not in fact stroke, however, he has a history of chronic appearing infarcts that appear to be true infarcts including in the cerebellum.  Stroke neurology recommends observation admission and repeat head imaging as we cannot obtain an MRI due to his recent pacemaker insertion.  Discussed this with patient and he is in agreement.  He had spontaneous improvement here and is no longer dizzy.  Lab work-up is reassuring.  He was in stable condition at time of admission.    Diagnosis:    ICD-10-CM    1. Dizziness  R42          9/29/2022   MD Michael Saleh Nicholas J, MD  09/30/22 1051

## 2022-09-29 NOTE — PLAN OF CARE
ROOM # 228    Living Situation (if not independent, order SW consult):   Facility name:  :   Miya Elizabeth Spouse [phone]     Activity level at baseline: Independent w/ cane  Activity level on admit: Ax1 w/ walker     Who will be transporting you at discharge: Miya (spouse)     Patient registered to observation; given Patient Bill of Rights; given the opportunity to ask questions about observation status and their plan of care.  Patient has been oriented to the observation room, bathroom and call light is in place.    Discussed discharge goals and expectations with patient/family.

## 2022-09-29 NOTE — ED TRIAGE NOTES
Pt woke up with dizziness between 6-7am and right-sided facial pain. Pacemaker placed one week ago, admitted for stroke two weeks ago. Slurred speech, but wife states this is normal. States he is not taking any blood thinners. ABCs intact. A&OX4.      Triage Assessment     Row Name 09/29/22 1121       Triage Assessment (Adult)    Airway WDL WDL       Respiratory WDL    Respiratory WDL WDL       Skin Circulation/Temperature WDL    Skin Circulation/Temperature WDL WDL       Cardiac WDL    Cardiac WDL WDL       Peripheral/Neurovascular WDL    Peripheral Neurovascular WDL WDL       Cognitive/Neuro/Behavioral WDL    Cognitive/Neuro/Behavioral WDL WDL

## 2022-09-29 NOTE — TELEPHONE ENCOUNTER
"Pt left VM, he said he is having dizziness right now and he was told to call if he has dizziness.     Chart reviewed. Pt had [employer] PPM implanted on 9/22/2022 for 2nd degree AVB. His 1 week PPM check is scheduled for 10/4/2022. It looks like he was sent home with a remote monitor because [facility] website says status is \"monitored.\"     Called pt back. He said the dizziness started about 7:00 this morning and he is still feeling dizzy right now at 8:40am. He is sitting in a chair. I asked pt to send a manual transmission. His wife helped him, I talked them through sending manually. Awaiting transmission in clinic. Told pt I will call him back with results.     ADDENDUM 8:50am. Transmission received, lead measurements are stable, no arrhythmias, and presenting rhythm is AS/ and AP/. Called pt back and informed him the pacemaker check was completely normal.     Asked pt more information about his dizziness. He said since he got the pacemaker he has been feeling great until this morning. He has been active and walking around without using his cane. Now this morning he's not sure he can walk even with using his cane. I told pt that if his dizziness is severe he should go to ER for work up. Pt states understanding, he will monitor for a while, but may go to ER today if he doesn't feel better.   "

## 2022-09-30 ENCOUNTER — APPOINTMENT (OUTPATIENT)
Dept: PHYSICAL THERAPY | Facility: CLINIC | Age: 87
End: 2022-09-30
Attending: PHYSICIAN ASSISTANT
Payer: COMMERCIAL

## 2022-09-30 ENCOUNTER — APPOINTMENT (OUTPATIENT)
Dept: CT IMAGING | Facility: CLINIC | Age: 87
End: 2022-09-30
Attending: PHYSICIAN ASSISTANT
Payer: COMMERCIAL

## 2022-09-30 ENCOUNTER — APPOINTMENT (OUTPATIENT)
Dept: CARDIOLOGY | Facility: CLINIC | Age: 87
End: 2022-09-30
Attending: PHYSICIAN ASSISTANT
Payer: COMMERCIAL

## 2022-09-30 VITALS
BODY MASS INDEX: 25.71 KG/M2 | SYSTOLIC BLOOD PRESSURE: 115 MMHG | HEIGHT: 73 IN | WEIGHT: 194 LBS | OXYGEN SATURATION: 97 % | TEMPERATURE: 98.6 F | HEART RATE: 62 BPM | DIASTOLIC BLOOD PRESSURE: 55 MMHG | RESPIRATION RATE: 16 BRPM

## 2022-09-30 LAB
ANION GAP SERPL CALCULATED.3IONS-SCNC: 6 MMOL/L (ref 7–15)
ATRIAL RATE - MUSE: 63 BPM
BUN SERPL-MCNC: 17.8 MG/DL (ref 8–23)
CALCIUM SERPL-MCNC: 8.4 MG/DL (ref 8.8–10.2)
CHLORIDE SERPL-SCNC: 105 MMOL/L (ref 98–107)
CREAT SERPL-MCNC: 0.92 MG/DL (ref 0.67–1.17)
DEPRECATED HCO3 PLAS-SCNC: 27 MMOL/L (ref 22–29)
DIASTOLIC BLOOD PRESSURE - MUSE: NORMAL MMHG
ERYTHROCYTE [DISTWIDTH] IN BLOOD BY AUTOMATED COUNT: 13.8 % (ref 10–15)
GFR SERPL CREATININE-BSD FRML MDRD: 82 ML/MIN/1.73M2
GLUCOSE BLDC GLUCOMTR-MCNC: 140 MG/DL (ref 70–99)
GLUCOSE BLDC GLUCOMTR-MCNC: 86 MG/DL (ref 70–99)
GLUCOSE SERPL-MCNC: 84 MG/DL (ref 70–99)
HCT VFR BLD AUTO: 36.1 % (ref 40–53)
HGB BLD-MCNC: 11.4 G/DL (ref 13.3–17.7)
INTERPRETATION ECG - MUSE: NORMAL
LVEF ECHO: NORMAL
MCH RBC QN AUTO: 30.5 PG (ref 26.5–33)
MCHC RBC AUTO-ENTMCNC: 31.6 G/DL (ref 31.5–36.5)
MCV RBC AUTO: 97 FL (ref 78–100)
P AXIS - MUSE: NORMAL DEGREES
PLATELET # BLD AUTO: 123 10E3/UL (ref 150–450)
POTASSIUM SERPL-SCNC: 4.3 MMOL/L (ref 3.4–5.3)
PR INTERVAL - MUSE: 206 MS
QRS DURATION - MUSE: 174 MS
QT - MUSE: 516 MS
QTC - MUSE: 528 MS
R AXIS - MUSE: 270 DEGREES
RBC # BLD AUTO: 3.74 10E6/UL (ref 4.4–5.9)
SODIUM SERPL-SCNC: 138 MMOL/L (ref 136–145)
SYSTOLIC BLOOD PRESSURE - MUSE: NORMAL MMHG
T AXIS - MUSE: 68 DEGREES
VENTRICULAR RATE- MUSE: 63 BPM
WBC # BLD AUTO: 4.4 10E3/UL (ref 4–11)

## 2022-09-30 PROCEDURE — 255N000002 HC RX 255 OP 636: Performed by: HOSPITALIST

## 2022-09-30 PROCEDURE — G0378 HOSPITAL OBSERVATION PER HR: HCPCS

## 2022-09-30 PROCEDURE — 93321 DOPPLER ECHO F-UP/LMTD STD: CPT

## 2022-09-30 PROCEDURE — 93308 TTE F-UP OR LMTD: CPT | Mod: 26 | Performed by: INTERNAL MEDICINE

## 2022-09-30 PROCEDURE — 70450 CT HEAD/BRAIN W/O DYE: CPT

## 2022-09-30 PROCEDURE — 85027 COMPLETE CBC AUTOMATED: CPT | Performed by: PHYSICIAN ASSISTANT

## 2022-09-30 PROCEDURE — 82962 GLUCOSE BLOOD TEST: CPT

## 2022-09-30 PROCEDURE — 99207 PR NO BILLABLE SERVICE THIS VISIT: CPT | Performed by: STUDENT IN AN ORGANIZED HEALTH CARE EDUCATION/TRAINING PROGRAM

## 2022-09-30 PROCEDURE — 250N000013 HC RX MED GY IP 250 OP 250 PS 637: Performed by: PHYSICIAN ASSISTANT

## 2022-09-30 PROCEDURE — 97161 PT EVAL LOW COMPLEX 20 MIN: CPT | Mod: GP

## 2022-09-30 PROCEDURE — 93321 DOPPLER ECHO F-UP/LMTD STD: CPT | Mod: 26 | Performed by: INTERNAL MEDICINE

## 2022-09-30 PROCEDURE — 93325 DOPPLER ECHO COLOR FLOW MAPG: CPT | Mod: 26 | Performed by: INTERNAL MEDICINE

## 2022-09-30 PROCEDURE — 36415 COLL VENOUS BLD VENIPUNCTURE: CPT | Performed by: PHYSICIAN ASSISTANT

## 2022-09-30 PROCEDURE — 93325 DOPPLER ECHO COLOR FLOW MAPG: CPT

## 2022-09-30 PROCEDURE — G0426 INPT/ED TELECONSULT50: HCPCS | Mod: G0 | Performed by: PHYSICIAN ASSISTANT

## 2022-09-30 PROCEDURE — 82310 ASSAY OF CALCIUM: CPT | Performed by: PHYSICIAN ASSISTANT

## 2022-09-30 RX ORDER — METOPROLOL SUCCINATE 25 MG/1
12.5 TABLET, EXTENDED RELEASE ORAL DAILY
Qty: 30 TABLET | Refills: 0 | Status: SHIPPED | OUTPATIENT
Start: 2022-09-30 | End: 2024-06-03

## 2022-09-30 RX ADMIN — HUMAN ALBUMIN MICROSPHERES AND PERFLUTREN 3 ML: 10; .22 INJECTION, SOLUTION INTRAVENOUS at 12:44

## 2022-09-30 RX ADMIN — ASPIRIN 81 MG: 81 TABLET, COATED ORAL at 08:56

## 2022-09-30 ASSESSMENT — ACTIVITIES OF DAILY LIVING (ADL)
ADLS_ACUITY_SCORE: 35

## 2022-09-30 ASSESSMENT — ENCOUNTER SYMPTOMS
NAUSEA: 0
CONFUSION: 0
VOMITING: 0
ABDOMINAL PAIN: 0
SPEECH DIFFICULTY: 0
WEAKNESS: 0
HEADACHES: 1
FEVER: 0
CHILLS: 0
COUGH: 0
DIARRHEA: 0
DIZZINESS: 1
NUMBNESS: 0

## 2022-09-30 NOTE — PROGRESS NOTES
09/30/22 1019   Quick Adds   Type of Visit Initial PT Evaluation   Living Environment   People in Home spouse   Current Living Arrangements house   Home Accessibility stairs to enter home;stairs within home   Number of Stairs, Main Entrance 2   Stair Railings, Main Entrance railings safe and in good condition   Number of Stairs, Within Home, Primary greater than 10 stairs  (to bedroom level)   Stair Railings, Within Home, Primary railings safe and in good condition   Transportation Anticipated family or friend will provide   Living Environment Comments Patient lives in a 2 story home   Self-Care   Usual Activity Tolerance good   Current Activity Tolerance moderate   Equipment Currently Used at Home cane, straight   Fall history within last six months yes   Number of times patient has fallen within last six months 1   Activity/Exercise/Self-Care Comment Patient reports that at baseline, he does not use an assistive device with gait.  Patient was admitted to [facility] on 9/19 and was evaluated by PT who recommended use of walker vs cane with mobility at discharge.  Patient was transferred to [facility] and reports not using an assistive device for ambulation since discharge from [facility] approx 1 week ago.  Wife reports that patient was able to independently walk in home and accesss [employer] on 9/28, but reported increased unsteadiness on 9/29.   General Information   Onset of Illness/Injury or Date of Surgery 09/29/22   Referring Physician Antonia Dean PA-C   Patient/Family Therapy Goals Statement (PT) return home with spouse   Pertinent History of Current Problem (include personal factors and/or comorbidities that impact the POC) Per medical chart: Benjamin Elizabeth is a 87 year old male with past medical history significant for HTN, HLD, chronic anemia and BLE edema who was admitted on 9/29/2022 for further evaluation of gait instability.  Patient with recent admission at [facility] on 9/19 for gait instability and  lightheadedness.  Patient was transferred to [facility] for pacemaker placement secondary to new A-fib.  Patient was discharged from [facility] approx 1 week ago.   Existing Precautions/Restrictions fall;pacemaker  (Pacemaker approx 1 week ago)   Heart Disease Risk Factors Medical history   General Observations patient in bed; agreeable to session, hard of hearing   Cognition   Orientation Status (Cognition) oriented x 4   Pain Assessment   Patient Currently in Pain No   Posture    Posture Forward head position;Protracted shoulders   Range of Motion (ROM)   ROM Comment WFL   Strength (Manual Muscle Testing)   Strength Comments WFL   Bed Mobility   Comment, (Bed Mobility) Independent   Transfers   Transfers sit-stand transfer   Sit-Stand Transfer   Sit-Stand Dickson (Transfers) supervision   Assistive Device (Sit-Stand Transfers) walker, front-wheeled   Comment, (Sit-Stand Transfer) With use of walker, patient is Modified independent with transfers.  Without assistive device, patient required graded assist from CGA to SBA.   Gait/Stairs (Locomotion)   Dickson Level (Gait) supervision   Assistive Device (Gait) walker, front-wheeled   Distance in Feet (Required for LE Total Joints) 250+150   Negotiation (Stairs) stairs independence;handrail location;number of steps;ascending technique;descending technique   Handrail Location (Stairs) left side (ascending);right side (descending)   Number of Steps (Stairs) 14   Ascending Technique (Stairs) step-to-step;step-over-step  (demonstrated both)   Descending Technique (Stairs) step-to-step  (reports descent is more difficult, reports this is baseline)   Comment, (Gait/Stairs) With ambulation with walker, patient is modified independent.  With ambulation with cane, patient requires supervision.  With ambulation without assistive device, patient demonstrates decreased gait speed, reports increased feeling of instability.   Balance   Balance Comments Patient with history of fall  within last 6 months.  Patient with decreased balance with narrowed base of support/tandem stance.  Requires UE support at assistive device with mobility.  Patient with no loss of balance, instability (knee buckling or lateral deviations) noted with use of 2WW.  Walker recommended for all mobility.   Sensory Examination   Sensory Perception patient reports no sensory changes   Coordination   Coordination no deficits were identified   Muscle Tone   Muscle Tone no deficits were identified   Clinical Impression   Criteria for Skilled Therapeutic Intervention Evaluation only   PT Diagnosis (PT) Impaired functional mobility   Influenced by the following impairments decreased balance, reports of instability   Functional limitations due to impairments impaired independence with gait   Clinical Presentation (PT Evaluation Complexity) Stable/Uncomplicated   Clinical Presentation Rationale clinical judgement; level of assist   Clinical Decision Making (Complexity) low complexity   Anticipated Equipment Needs at Discharge (PT) walker, rolling  (pending progress)   Risk & Benefits of therapy have been explained evaluation/treatment results reviewed;care plan/treatment goals reviewed;risks/benefits reviewed;current/potential barriers reviewed;participants voiced agreement with care plan;participants included;patient   PT Discharge Planning   PT Discharge Recommendation (DC Rec) home with outpatient physical therapy   PT Rationale for DC Rec Patient seen by physical therapy for evaluation.  Patient reports no dizziness with mobility, however endorses feeling unstable with transfers and ambulation (began 9/29).  Patient with improved balance/stability and independence with ambulation with use of walker; patient previously independent with mobility.  Recommend walker with all mobility; will issue walker prior to discharge.  Recommend OP PT to address decreased stability, strength and activity tolerance.   Total Evaluation Time    Total Evaluation Time (Minutes) 25

## 2022-09-30 NOTE — CONSULTS
[facility]    Stroke Consult Note    Reason for Consult:  History of stroke    Chief Complaint: Dizziness and Facial Pain       HPI  Benjamin Elizabeth is a 87 year old male with pertinent past medical history of CAD, HTN, HLD, chronic anemia, recent admission about 2 weeks ago for possible stroke (symptoms dizziness and gait instability), upon review of my colleague, NP Alisa Doan' note the MRI DWI change to deep right frontal white matter was reviewed with neuroradiology and felt not to be acute infarct (but imaging does show that there are chronic infarcts to b/l frontal and L parietal, and cerebellum. 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    He presented to [facility] ED 9/29/22 with recurrent dizziness (similar to prior presentation), also had R facial pain. /76. CT/CTA showed mild b/l athero to carotids without significant stenosis. CTP unremarkable. MRI not able to be obtained due to recent PPM. Repeat CTH today was negative for acute infarct.    He was seen via telemedicine video eval. Symptoms resolved yesterday after about 2 hours. Discussed that although not able to definitively rule out acute stroke without the MRI, given that he states these symptoms are similar to recent presentation and that MRI was negative for acute stroke and the CTH x 2 this admission negative as well then low suspicion that this is from stroke/TIA. Interrogation was negative but discussed that given chronic strokes it is beneficial to do the TTE and secondary prevention risk factor management. All risk factors well controlled other than potentially blood pressure which he monitors at home and at times says SBP in 150s. Discussed bringing the log to his PCP f/u to review. Given that no recent strokes it is reassuring that he is currently on appropriate management. However if Afib found in the future will need to consider  anticoagulation--will need ongoing PPM interrogations. He does state that with walking outside he occasionally gets light chest pressure that quickly resolves, no current chest pain this admission or with presenting symptoms. Trops and ECG had been checked as well. Discussed that he should bring up at PCP visit who may refer him for stress testing or cardiology evaluation if indicated.     Evaluation Summarized    MRI/Head CT MRI: not able to get for PPM  CTH repeat: negative for acute stroke  CTP: unremarkable  CTH: negative for acute stroke, chronic SVID, chronic L middle frontal lobe gyrus and L parietal lobe infarcts   Intracranial/Cervical Vasculature CTA head/neck: mild b/l carotid athero without significant stenosis     Echocardiogram TTE: Pending   EKG/Telemetry AV dual-paced rhythm    Other Testing Not Applicable     LDL  9/19/2022: 45 mg/dL   A1C  9/19/2022: 5.6 %   Troponin 9/29/2022: 18 ng/L       Impression  Intermittent dizziness and gait imbalance, repeat CTH negative for stroke (unable to definitively rule out since can't have MRI due to recent PPM), otherwise likely symptoms may be caused by abnormal heart rhythm, recent PPM placed for 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), the lacunars suspected due to vascular risk factors/small vessel disease, suspect possible underlying atrial fibrillation, currently vascular risk factors well controlled other  "than possible BP above goal-- will benefit from secondary prevention    Recommendations  -Discussed with vascular neurology attending,   -Neuro checks and vitals every 4 hours  -TTE pending  - no need for permissive HTN, recommend treatment for any potential metabolic/infectious/toxic sources that could cause stroke recrudescence or any arrhythmia or peripheral source of vertigo  -long term outpatient blood pressure goal <130/80, recommend home monitoring twice daily in AM and PM, keep log and bring to f/u with PCP  -continue PTA ASA 81 mg daily  -LDL 45, continue PTA Lipitor 40 mg daily, follow-up with PCP for titration to goal LDL 40-70, <40 increases risk of Intracranial hemorrhage  -Blood glucose monitoring, Hgb A1c 5.6%, (at goal <7% for secondary stroke prevention), follow-up with PCP  -Mediterranean diet can be beneficial for overall decreased cardiovascular risk, please print hand out for patient at discharge   -telemetry, will need ongoing interrogations of PPM to look for atrial fibrillation, if found then will need to discuss anticoagulation  -PT/OT/SPT  -Smoking screen: former smoker x 3 years, quit 60 years ago,  -Sleep Apnea screen: denies significant snoring or any apneic periods witnessed by family  -Euthermia, euglycemia, eunatremia  -Stroke Education  -Stroke Class per [facility]    Patient Follow-up    -f/u with PCP in 1-2 weeks  -f/u with neurology in 6-8 weeks (ordered)  -f/u with PT for vestibular evaluation (ordered)    Thank you for this consult. We will follow peripherally for results of TTE. If no concerns then we will sign off.    Brittanie Cedeno PA-C  Vascular Neurology  To page me or covering stroke neurology team member, click here: [facility]   Choose \"On Call\" tab at top, then search dropdown box for \"Neurology Adult\", select location, press Enter, then look for stroke/neuro " ICU/telestroke.    _____________________________________________________    Clinically Significant Risk Factors Present on Admission             # Hypoalbuminemia: Albumin = 3.1 g/dL (Ref range: 3.5 - 5.2 g/dL) on admission, will monitor as appropriate   # Thrombocytopenia: Plts = 123 10e3/uL (Ref range: 150 - 450 10e3/uL) on admission, will monitor for bleeding       Past Medical History   No past medical history on file.  Past Surgical History   Past Surgical History:   Procedure Laterality Date     EP PACEMAKER DEVICE & LEAD IMPLANT- RIGHT ATRIAL & LEFT VENTRICULAR N/A 9/22/2022    Procedure: Pacemaker Device & Lead Implant- Right Atrial & Left Ventricular;  Surgeon: Blaze Cain MD;  Location:  [facility]     Medications   Home Meds  Prior to Admission medications    Medication Sig Start Date End Date Taking? Authorizing Provider   aspirin 81 MG EC tablet Take 81 mg by mouth daily (with dinner)   Yes Unknown, Entered By History   atorvastatin (LIPITOR) 40 MG tablet Take 40 mg by mouth daily (with dinner)   Yes Unknown, Entered By History   losartan (COZAAR) 25 MG tablet Take 37.5 mg by mouth daily (with dinner)   Yes Unknown, Entered By History   multivitamin, therapeutic (THERA-VIT) TABS tablet Take 1 tablet by mouth daily (with dinner)   Yes Unknown, Entered By History   cyanocobalamin (CYANOCOBALAMIN) 1000 MCG/ML injection Inject 1 mL (1,000 mcg) into the muscle every 7 days 9/23/22   Shira Julian MD       Scheduled Meds    aspirin  81 mg Oral Daily     atorvastatin  40 mg Oral QPM     losartan  37.5 mg Oral QPM       Infusion Meds    - MEDICATION INSTRUCTIONS -       - MEDICATION INSTRUCTIONS -         PRN Meds  acetaminophen **OR** acetaminophen, bisacodyl, - MEDICATION INSTRUCTIONS -, - MEDICATION INSTRUCTIONS -, melatonin, ondansetron **OR** ondansetron, polyethylene glycol, senna-docusate **OR** senna-docusate    Allergies   No Known Allergies  Family History   No family  history on file.  Social History        Review of Systems   The 10 point Review of Systems is negative other than noted in the HPI or here.        PHYSICAL EXAMINATION   Temp:  [97.5  F (36.4  C)-98.5  F (36.9  C)] 98.2  F (36.8  C)  Pulse:  [60-66] 63  Resp:  [16-18] 16  BP: (111-168)/(62-94) 127/65  SpO2:  [95 %-100 %] 98 %    General Exam  General:  patient lying in bed without any acute distress    HEENT:  normocephalic/atraumatic  Pulmonary:  no respiratory distress    Neuro Exam  Mental Status:  alert, oriented x 3, follows commands, speech clear and fluent, naming and repetition normal  Cranial Nerves:  visual fields intact (tested by nurse), EOMI with normal smooth pursuit, facial sensation intact and symmetric (tested by nurse), facial movements symmetric, hearing not formally tested but intact to conversation, no dysarthria, tongue protrusion midline  Motor:  no abnormal movements, able to move all limbs antigravity spontaneously with no signs of hemiparesis observed, no pronator drift   Reflexes:  unable to test (telestroke)  Sensory:  light touch sensation intact and symmetric throughout upper and lower extremities (assessed by nurse), no extinction on double simultaneous stimulation (assessed by nurse)  Coordination:  normal finger-to-nose and heel-to-shin bilaterally without dysmetria, rapid alternating movements symmetric  Station/Gait:  unable to test due to telestroke    Dysphagia Screen  Per RN    Stroke Scales  National Institutes of Health Stroke Scale  Exam Interval: Stroke Neurology inpt eval   Score    Level of consciousness: (0)   Alert, keenly responsive    LOC questions: (0)   Answers both questions correctly    LOC commands: (0)   Performs both tasks correctly    Best gaze: (0)   Normal    Visual: (0)   No visual loss    Facial palsy: (0)   Normal symmetrical movements    Motor arm (left): (0)   No drift    Motor arm (right): (0)   No drift    Motor leg (left): (0)   No drift    Motor leg  (right): (0)   No drift    Limb ataxia: (0)   Absent    Sensory: (0)   Normal- no sensory loss    Best language: (0)   Normal- no aphasia    Dysarthria: (0)   Normal    Extinction and inattention: (0)   No abnormality        Total Score:  0       Modified Teresa Score (Pre-morbid) and (post- work-up)  1-No significant disability despite symptoms     Imaging  I personally reviewed all imaging; relevant findings per HPI.    Labs Data   CBC  Recent Labs   Lab 09/30/22  0625 09/29/22  1131   WBC 4.4 5.4   RBC 3.74* 4.26*   HGB 11.4* 13.1*   HCT 36.1* 41.9   * 135*     Basic Metabolic Panel   Recent Labs   Lab 09/30/22  0832 09/30/22  0625 09/29/22  2142 09/29/22  1714 09/29/22  1236   NA  --  138  --   --  134*   POTASSIUM  --  4.3  --   --  4.9   CHLORIDE  --  105  --   --  104   CO2  --  27  --   --  22   BUN  --  17.8  --   --  20.9   CR  --  0.92  --   --  0.91   GLC 86 84 120*   < > 81   ERASTO  --  8.4*  --   --  8.4*    < > = values in this interval not displayed.     Liver Panel  Recent Labs   Lab 09/29/22  1236   PROTTOTAL 6.1*   ALBUMIN 3.1*   BILITOTAL 0.6   ALKPHOS 66   AST 28   ALT 10     INR    Recent Labs   Lab Test 09/29/22  1211 09/21/22  0642   INR 1.12 1.07      Lipid Profile    Recent Labs   Lab Test 09/19/22  1946   CHOL 117   HDL 63   LDL 45   TRIG 46     A1C    Recent Labs   Lab Test 09/19/22  1946   A1C 5.6     Troponin    Recent Labs   Lab 09/29/22  1131   CTROPT 18          Stroke Consult Data Data   Telestroke Service Details  (for non-emergent stroke consult with tele)  Video start time 09/30/22   1102   Video end time 09/30/22   1144   Type of service telemedicine diagnostic assessment of acute neurological changes   Reason telemedicine is appropriate patient requires assessment with a specialist for diagnosis and treatment of neurological symptoms   Mode of transmission secure interactive audio and video communication per Musa   Originating site (patient location[facility]    Distant site (provider location) [facility] [place]     Billing: I have personally spent a total of 70 minutes providing care today, time spent in reviewing medical records and reviewing tests, examining the patient and obtaining history, coordination of care, and discussion with the patient and/or family regarding diagnostic results, prognosis, symptom management, risks and benefits of management options, and development of plan of care. Greater than 50% was spent in counseling and coordination of care.

## 2022-09-30 NOTE — CONSULTS
Patient declined need for another stroke education class- he had stroke class on 9/21 during a previous admission.    Paula Renee RN  [facility]  [phone]

## 2022-09-30 NOTE — PROGRESS NOTES
Patient's After Visit Summary was reviewed with patient and/or spouse.   Patient verbalized understanding of After Visit Summary, recommended follow up and was given an opportunity to ask questions. IV's removed.  Discharge medications sent home with patient/family: No   Discharged with spouse

## 2022-09-30 NOTE — CONSULTS
Care Management Follow Up    Length of Stay (days): 0    Expected Discharge Date: 09/30/2022     Concerns to be Addressed:       Patient plan of care discussed at interdisciplinary rounds: Yes    Anticipated Discharge Disposition:       Anticipated Discharge Services:    Anticipated Discharge DME:      Patient/family educated on Medicare website which has current facility and service quality ratings:    Education Provided on the Discharge Plan:    Patient/Family in Agreement with the Plan:      Referrals Placed by CM/SW:    Private pay costs discussed: Not applicable    Additional Information:  PT recommended outpatient therapy. Writer met with pt to complete [place] letter. Pt is eager to return home.  No further needs from SW at this time.       JERICHO Brothers

## 2022-09-30 NOTE — PROGRESS NOTES
Interval:  Patient was discharged prior to our review of TTE; however on review of TTE it shows LV mildly dilated, LV systolic function severely reduced, EF 30-35%, severe hypokinesis of essentially all mid to apical segments of LV, septal wall motion abnormality may reflect PPM activation, no LV thrombus seen, mild-moderate biatrial enlargement, mild aortic root dilation, moderate dilation of ascending aorta, interval deterioration of LV systolic function compared to prior study 9/20/2022    Recommendations  - Discussed findings EF 30% with attending Dr. Vasquez, given that his strokes are chronic and unclear when they occurred and no recent embolic infarcts seen on CTh, do not recommend anticoagulation from stroke perspective at this time, if he were to have further embolic events would need to consider anticoagulation, hospitalist did refer to cardiology which I feel is appropriate based on all findings above  - No other changes to recommendations as outlined in consult note from earlier today, will need to follow-up as indicated with stroke team  - We will sign off

## 2022-09-30 NOTE — DISCHARGE SUMMARY
[facility]  Hospitalist Discharge Summary      Date of Admission:  9/29/2022  Date of Discharge:  9/30/2022  Discharging Provider: NOLAN Ambriz seen in conjunction with Surekha Sebastian PA-C   Discharge Service: Hospitalist Service    Discharge Diagnoses   1. Gait instability   2. Right frontal DWI abnormality seen on MRI 9/19/2022, likely artifact   3. HTN   4. Hyperlipidemia   5. CAD   6. Hyponatremia, resolved    7. Hypocalcemia   8. Chronic anemia   9. Chronic thrombocytopenia   10. Vitamin B12 deficiency   11. Cardiomyopathy, new    Follow-ups Needed After Discharge   Follow-up Appointments    Follow-up and recommended labs and tests      Follow up with primary care provider, [facility], within 7-14 days for hospital follow- up. No follow up labs or test are needed.   Sleep Apnea screen  Follow up with Neurology in 6-8 weeks  Outpatient Physical Therapy referral made        Discharge Disposition   Discharged to home  Condition at discharge: Stable    Hospital Course   Benjamin Elizabeth is a 87 year old male with past medical history significant for HTN, HLD, chronic anemia and BLE edema who was admitted on 9/29/2022 for further evaluation of gait instability.     Of note, patient was admitted for possible stroke between 9/19-9/21 after he experienced symptoms of dizziness and gait instability. At that time, imaging notable for an MRI that demonstrated a lacunar infarct of the right frontal lobe concerning for acute CVA. He was seen by Stroke Neurology. MRI's were reviewed with neuro radiology where neurology endorsed that the right frontal DWI abnormality seen on MRI was not felt to clearly represent stroke and was favored to be artifact, in addition to there being no clear clinical or radiographic evidence of stroke. No additional stroke work-up was recommended. Patient was resumed on ASA and statin. In addition, initial ECG at that time showed Mobitz  Type I with telemetry evidence revealing Mobitz Type II heart block. His heart rates maintained in the 40's even with exertion. Cardiology was consulted and patient had permanent pacemaker placed at [facility].      In the ED, patient was hemodynamically stable. CXR unremarkable. Likewise, CTA, CTH and CTP images unremarkable. EKG demonstrated AV dual-paced rhythm. CBC demonstrated a hgb of 13.1, RBC count of 4.26 and PLT count of 135, improved from one week ago. CMP notable for a slight hyponatremia of 134 and hypocalcemia of 8.4. Troponin WNL. INR and PTT WNL. UA normal other than a few amorphous crystals. Patient is COVID negative. Interrogation of pacer negative for evidence of arrhythmia, please see note from cardiology 9/29.      #Gait instability   #Right Frontal DWI Abnormality (Seen on MRI 9/19/2022) likely Artifact   - Patient was recently admitted for dizziness and gait instability where he was found to have possible stroke and also a heart block requiring placement of a permanent pacemaker. Upon further review, MRI was thought to be artifact rather than acute stroke. He states that following his discharge two weeks ago, he has overall been feeling improved following his pacemaker placement. Patient reports he's been able to walk increasing distances and has had no recurrence of gait instability until this morning when he awoke at 0630 and had difficulty rising from a sitting position while trying to get out of bed. He states this sensation of instability while raising from a sitting position has continued throughout the day, which is why he presented to the ED. He denies room spinning dizziness but describes it more as instability while trying to stand. Initial work up in ED is unremarkable.  - MRI images from 9/19 were reviewed and the right frontal DWI abnormality is not felt to clearly represent stroke and favored to be artifact, per neurology   - Repeat CTH to look for any areas of infarct, per neurology,  unchanged and demonstrated no evidence for acute intracranial pathology  - Patient is unable to receive MRI given recently implanted pacemaker   - Orthostatic blood pressures unremarkable   - limited echo obtained per neurology. Compared to echo on 09/20/2022, EF has reduced from 55-60% to 30-35% with severe hypokinesis of mid to apical segments, no thrombus noted.   - Telemetry revealed AV paced with HR in 60's. Patient will need to have ongoing interrogations of PPM to look for atrial fibrillation, if found will need anticoagulation per neurology.  - Neurology does not suspect TIA and believes it's unlikely a prior cerebellar stroke recrudescence given no concerns found of metabolic/infectious/toxic etiologies. Patient to follow-up with neurology in 6-8 weeks for further evaluation  - Continue ASA 81 mg and Atorvastatin 40 mg daily. Follow-up with PCP for titration of statin PRN to ensure LDL between 40-70  - PT consulted, instability improved with walker, will discharge with a walker. Outpatient PT consult placed    #Cardiomyopathy, new  Limited echo repeated per neurology for stroke work up. No evidence of LV thrombus. EF has reduced from 55-60% on 9/20 to 30-35% today with hypokinesis of mid to apical segments. Pt denies chest pain, palpitations or SOB. Suspect stress cardiomyopathy   - echo results returned after discharge, pt was called and discussed results  - will add low dose metoprolol XL 12.5 mg daily  - Urgent Cardiology referral placed     #Hyponatremia, resolved   #Hypocalcemia   - Na 134 in the ED. Resolved at 138 today   - Ca 8.4. Continues to be 8.4 today. PCP to continue to monitor in 1-2 weeks upon discharge     #Hypertension  #Hyperlipidemia  #CAD  - Denies chest pain, palpitations or SOB   - Coronary angio in 2019 showing mild CAD  - Please see note from cardiology 9/29 of interrogation (patient noted to be in sinus rhythm with no arrhythmia noted)  - Continue PTA regimen: Losartan 37.5 mg and  Atorvastatin 40 mg daily (if BPs low, consider adjusting losartan)   - Orthostatic blood pressures unremarkable, per above  - Telemetry monitoring, per above   - Long term outpatient blood pressure goal <130/80, recommend home monitoring twice daily in AM and PM, keep log and bring to f/u with PCP in 1-2 weeks   - Patient will need to have ongoing interrogations of PPM to look for atrial fibrillation, if found will need anticoagulation per neurology     #Chronic Anemia   #Chronic Thrombocytopenia  #Vitamin B12 deficiency  - Has followed with Heme/Onc OP with anemia noted in June 2016  - B12 level at 166 one week ago. Started on 1000 mcg vitamin B12 injections weekly for up to 6 doses followed by repeat check. Check B12 today   - Hgb 13.1, improved from 11.9 9/22  - PLTs 135, increased from 107 9/22  - PCP to continue to monitor in 1-2 weeks upon discharge     Consultations This Hospital Stay   PATIENT LEARNING CENTER IP CONSULT  PHARMACY IP CONSULT  PHYSICAL THERAPY ADULT IP CONSULT  CARE MANAGEMENT / SOCIAL WORK IP CONSULT  NEUROLOGY IP STROKE CONSULT  PATIENT LEARNING CENTER IP CONSULT    Code Status   Full Code    Time Spent on this Encounter   I, Tiera Odonnell, personally saw the patient today and spent less than or equal to 30 minutes discharging this patient.       Tiera Odonnell PA-S   Seen in conjunction with Surekha Sebastian PA-C   [facility] OBSERVATION DEPT  [address] [place] [address]  Phone: [phone]  ______________________________________________________________________    Physical Exam   Vital Signs: Temp: 98.6  F (37  C) Temp src: Oral BP: 115/55 Pulse: 62   Resp: 16 SpO2: 97 % O2 Device: None (Room air)    Weight: 194 lbs 0 oz     GENERAL:  Pleasant, cooperative, alert. Well developed, well nourished. Sitting comfortably in bed upon examination.   HEENT: Normocephalic, atraumatic. Extra occular mm intact.  Sclera clear. PERRL. Mild wax build-up  in bilateral ear canals without impaction. Mucous membranes moist. No erythema; uvula midline. Neck supple with no adenopathy.   PULMONOLOGY: Clear to auscultation bilaterally with good exchange at the bases and no wheeze or crackle.  CARDIAC: Regular rate and rhythm (AV paced). No appreciated murmur.   ABDOMEN: Soft, nontender non distended.   MUSCULOSKELETAL:  Moving x 4 spontaneously with CMS intact x4.  Normal bulk and tone. 2+ pitting LE edema bilaterally.     NEURO: Alert and oriented x3. CN II-XII grossly intact and symmetric. Nonfocal exam. Rhomberg negative. Heel-to-shin negative. Rapid alternating movements intact.     Primary Care Physician   [facility]    Discharge Orders      Physical Therapy Referral      Physical Therapy Referral      Reason for your hospital stay    Gait instability. You had similar symptoms 9/19 and were admitted for stroke work up which was unremarkable. You did have second degree AV block and a pacemaker was placed. Initial work up here was unremarkable. CT head and CTA head and neck were negative for acute process. Unable to get an MRI due to pacemaker. Repeat CT head negative for any acute change suggesting stroke. Stroke Neurology consulted, no changes to your regimen were made. A repeat echocardiogram was recommended and pending. Outpatient physical therapy and sleep apnea screen recommended.     Follow-up and recommended labs and tests     Follow up with primary care provider, [facility], within 7-14 days for hospital follow- up.  No follow up labs or test are needed. Sleep Apnea screen  Follow up with Neurology in 6-8 weeks  Outpatient Physical Therapy referral made     Activity    Your activity upon discharge: activity as tolerated     When to contact your care team    Call your primary doctor if you have any of the following: focal neurological deficit, one sides weakness, facial, droop, speech difficulties or vision loss.     Walker Order  for DME - ONLY FOR DME    I, the undersigned, certify that the above prescribed supplies are medically necessary for this patient and is both reasonable and necessary in reference to accepted standards of medical and necessary in reference to accepted standards of medical practice in the treatment of this patient's condition and is not prescribed as a convenience.      Diet    Follow this diet upon discharge: Regular     Stroke Hospital Follow Up    [facility] [place] will call you to coordinate care as prescribed by your provider. If you don t hear from a representative within 2 business days, please call [phone].         Significant Results and Procedures   Most Recent 3 CBC's:Recent Labs   Lab Test 09/30/22  0625 09/29/22  1131 09/22/22  1858 09/22/22  0625   WBC 4.4 5.4  --  6.6   HGB 11.4* 13.1* 11.9* 11.7*   MCV 97 98  --  97   * 135*  --  107*     Most Recent 3 BMP's:Recent Labs   Lab Test 09/30/22  0832 09/30/22  0625 09/29/22  2142 09/29/22  1714 09/29/22  1236 09/29/22  1124 09/22/22  1859   NA  --  138  --   --  134*  --  139   POTASSIUM  --  4.3  --   --  4.9  --  4.0   CHLORIDE  --  105  --   --  104  --  110*   CO2  --  27  --   --  22  --  24   BUN  --  17.8  --   --  20.9  --  21   CR  --  0.92  --   --  0.91  --  0.94   ANIONGAP  --  6*  --   --  8  --  5   ERASTO  --  8.4*  --   --  8.4*  --  8.2*   GLC 86 84 120*   < > 81   < > 113*    < > = values in this interval not displayed.   ,   Results for orders placed or performed during the hospital encounter of 09/29/22   CT Head Perfusion w Contrast    Narrative    CT ANGIOGRAM OF THE HEAD AND NECK WITH CONTRAST  CT HEAD PERFUSION WITH CONTRAST September 29, 2022 11:49 AM     HISTORY: Code stroke tier 2. Dizziness.    TECHNIQUE: CT angiography with an injection of 75mL Isovue-370  (accession [id number]), 50mL Isovue-370 (accession [id number]) IV with  scans through the head and neck. Images were transferred to a separate  3-D workstation where  multiplanar reformations and 3-D images were  created. Estimates of carotid stenoses are made relative to the distal  internal carotid artery diameters except as noted. Radiation dose for  this scan was reduced using automated exposure control, adjustment of  the mA and/or kV according to patient size, or iterative  reconstruction technique.     Perfusion scans were performed with injection of additional IV  contrast. These images were processed on a separate 3-D workstation.     COMPARISON: MRA head and neck 9/19/2022.     CT HEAD FINDINGS: No contrast enhancing lesions. CT perfusion images  of the head are unremarkable.     CT ANGIOGRAM HEAD FINDINGS: The major intracranial arteries including  the proximal branches of the anterior cerebral, middle cerebral, and  posterior cerebral arteries appear patent without vascular cutoff. No  aneurysm identified. No significant stenosis. Venous circulation is  unremarkable.     Small vertebrobasilar system with patent bilateral posterior  communicating arteries supplying the PCA territories.    CT ANGIOGRAM NECK FINDINGS: Normal origin of the great vessels from  the aortic arch.     Right carotid artery: The right common and internal carotid arteries  are patent. Mild atherosclerotic disease at the carotid bifurcation  and proximal internal carotid artery without stenosis.     Left carotid artery: The left common and internal carotid arteries are  patent. Mild atherosclerotic disease at the carotid bifurcation and  proximal internal carotid artery without stenosis.     Vertebral arteries: Vertebral arteries are patent without evidence of  dissection. No significant stenosis. Right vertebral artery appears to  terminate primarily in the right PICA.    Other findings: Multilevel degenerative changes throughout the spine.       Impression    IMPRESSION:   1. Patent arteries in the neck without evidence of dissection. Mild  atherosclerotic disease in the carotid arteries  bilaterally without  significant stenosis by NASCET criteria.  2. Patent proximal major intracranial arteries without vascular  cutoff. No significant stenosis. No aneurysm identified.  3. Unremarkable CT perfusion images of the head.    Results discussed with Javi Rodriguez at 12:00 PM on 9/29/2022.     GAIL ESPINAL MD         SYSTEM ID:  [id number]   CTA Head Neck w Contrast    Narrative    CT ANGIOGRAM OF THE HEAD AND NECK WITH CONTRAST  CT HEAD PERFUSION WITH CONTRAST September 29, 2022 11:49 AM     HISTORY: Code stroke tier 2. Dizziness.    TECHNIQUE: CT angiography with an injection of 75mL Isovue-370  (accession [id number]), 50mL Isovue-370 (accession [id number]) IV with  scans through the head and neck. Images were transferred to a separate  3-D workstation where multiplanar reformations and 3-D images were  created. Estimates of carotid stenoses are made relative to the distal  internal carotid artery diameters except as noted. Radiation dose for  this scan was reduced using automated exposure control, adjustment of  the mA and/or kV according to patient size, or iterative  reconstruction technique.     Perfusion scans were performed with injection of additional IV  contrast. These images were processed on a separate 3-D workstation.     COMPARISON: MRA head and neck 9/19/2022.     CT HEAD FINDINGS: No contrast enhancing lesions. CT perfusion images  of the head are unremarkable.     CT ANGIOGRAM HEAD FINDINGS: The major intracranial arteries including  the proximal branches of the anterior cerebral, middle cerebral, and  posterior cerebral arteries appear patent without vascular cutoff. No  aneurysm identified. No significant stenosis. Venous circulation is  unremarkable.     Small vertebrobasilar system with patent bilateral posterior  communicating arteries supplying the PCA territories.    CT ANGIOGRAM NECK FINDINGS: Normal origin of the great vessels from  the aortic arch.     Right carotid artery: The  right common and internal carotid arteries  are patent. Mild atherosclerotic disease at the carotid bifurcation  and proximal internal carotid artery without stenosis.     Left carotid artery: The left common and internal carotid arteries are  patent. Mild atherosclerotic disease at the carotid bifurcation and  proximal internal carotid artery without stenosis.     Vertebral arteries: Vertebral arteries are patent without evidence of  dissection. No significant stenosis. Right vertebral artery appears to  terminate primarily in the right PICA.    Other findings: Multilevel degenerative changes throughout the spine.       Impression    IMPRESSION:   1. Patent arteries in the neck without evidence of dissection. Mild  atherosclerotic disease in the carotid arteries bilaterally without  significant stenosis by NASCET criteria.  2. Patent proximal major intracranial arteries without vascular  cutoff. No significant stenosis. No aneurysm identified.  3. Unremarkable CT perfusion images of the head.    Results discussed with Javi Rodriguez at 12:00 PM on 9/29/2022.     GAIL ESPINAL MD         SYSTEM ID:  [id number]   CT Head w/o Contrast    Narrative    CT SCAN OF THE HEAD WITHOUT CONTRAST   9/29/2022 11:46 AM     HISTORY: Code stroke tier 2. Dizziness.    TECHNIQUE:  Axial images of the head and coronal reformations without  IV contrast material. Radiation dose for this scan was reduced using  automated exposure control, adjustment of the mA and/or kV according  to patient size, or iterative reconstruction technique.    COMPARISON: Brain MR 9/19/2022.    FINDINGS: No acute intracranial hemorrhage. No mass effect or midline  shift. Mild diffuse parenchymal volume loss. Patchy periventricular  white matter hypodensities are nonspecific, but most likely related to  chronic microvascular ischemic disease. Cortical hypodensities in the  left middle frontal lobe gyrus and left parietal lobe are compatible  with previous  cortical infarcts. Ventricular size is within normal  limits without evidence of hydrocephalus.    Small left mastoid effusion. The bony calvarium and bones of the skull  base appear intact.       Impression    IMPRESSION:     1. No evidence of acute intracranial hemorrhage, mass, or herniation.  2. Mild diffuse parenchymal volume loss and white matter changes most  likely due to chronic microvascular ischemic disease.  3. 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  4. Evaluation for acute ischemia would be better assessed with brain  MRI.      GAIL ESPINAL MD         SYSTEM ID:  [id number]   CT Head w/o Contrast    Narrative    CT OF THE HEAD WITHOUT CONTRAST 9/30/2022 8:15 AM     COMPARISON: Head CT 9/29/2022    HISTORY: Follow up on CT scan to rule out CVA.    TECHNIQUE: 5 mm thick axial CT images of the head were acquired  without IV contrast material.    FINDINGS: There is moderate diffuse cerebral volume loss. There are  subtle patchy areas of decreased density in the cerebral white matter  bilaterally that are consistent with sequela of chronic small vessel  ischemic disease. Small chronic ischemic infarct in the left parietal  lobe again noted.    The ventricles and basal cisterns are within normal limits in  configuration given the degree of cerebral volume loss. There is no  midline shift. There are no extra-axial fluid collections.    No intracranial hemorrhage, mass or recent infarct.    The visualized paranasal sinuses are well-aerated. There is no  mastoiditis. There are no fractures of the visualized bones.      Impression    IMPRESSION: Diffuse cerebral volume loss and cerebral white matter  changes consistent with chronic small vessel ischemic disease. No  evidence for acute intracranial pathology.      Radiation dose for this scan was reduced using automated exposure  control, adjustment of the mA and/or kV according to  patient size, or  iterative reconstruction technique    MERRICK LYNCH MD         SYSTEM ID:  [id number]       Discharge Medications   Discharge Medication List as of 9/30/2022  2:15 PM      CONTINUE these medications which have NOT CHANGED    Details   aspirin 81 MG EC tablet Take 81 mg by mouth daily (with dinner), Historical      atorvastatin (LIPITOR) 40 MG tablet Take 40 mg by mouth daily (with dinner), Historical      losartan (COZAAR) 25 MG tablet Take 37.5 mg by mouth daily (with dinner), Historical      multivitamin, therapeutic (THERA-VIT) TABS tablet Take 1 tablet by mouth daily (with dinner), Historical      cyanocobalamin (CYANOCOBALAMIN) 1000 MCG/ML injection Inject 1 mL (1,000 mcg) into the muscle every 7 days, Disp-1 mL, R-4, E-Prescribe         Metoprolol succinate XL 12.5 mg daily (1/2 tab)    Allergies   No Known Allergies

## 2022-09-30 NOTE — PLAN OF CARE
PRIMARY DIAGNOSIS: GAIT INSTABILITY, DIZZINESS    OUTPATIENT/OBSERVATION GOALS TO BE MET BEFORE DISCHARGE  1. Orthostatic performed: No    2. Completion of appropriate imaging: Yes, head CT in morning     3. Tolerating PO medications: Yes    4. Return to near baseline physical activity: Yes    5. Cleared for discharge by consultants (if involved): No    Discharge Planner Nurse   Safe discharge environment identified: Yes  Barriers to discharge: Yes       Entered by: Florinda Uribe RN 09/30/2022 5:04 AM     Vitals are Temp: 98.2  F (36.8  C) Temp src: Oral BP: 111/62 Pulse: 65   Resp: 16 SpO2: 98 %.  Patient is Alert and Oriented x4. They are SBA with gait belt and walker.  Pt is a regular diet.  They are denying pain, nausea, and dizziness. All neuros intact. Strength equal and strong. Tele AV paced with hr in 60's. Patient is Saline locked. Plan: repeat head CT, elestroke consult.      Please review provider order for any additional goals.   Nurse to notify provider when observation goals have been met and patient is ready for discharge.

## 2022-09-30 NOTE — PLAN OF CARE
PRIMARY DIAGNOSIS: Gait Instability/Dizziness  OUTPATIENT/OBSERVATION GOALS TO BE MET BEFORE DISCHARGE:  1. ADLs back to baseline: Yes    2. Activity and level of assistance: Up with standby assistance. Using walker and gait belt.    3. Pain status: Pain free.    4. Return to near baseline physical activity: Yes     Discharge Planner Nurse   Safe discharge environment identified: Yes  Barriers to discharge: Yes       Entered by: Chelsy Mcclain RN 09/30/2022 10:21 AM   Pt is A/O x 4. Pt is SBA with gait belt and walker. Head CT completed this morning. Pt has Tele Stroke video consult scheduled for 11:00 AM.  Please review provider order for any additional goals.   Nurse to notify provider when observation goals have been met and patient is ready for discharge.

## 2022-09-30 NOTE — PLAN OF CARE
PRIMARY DIAGNOSIS: Gait Instability/Dizziness  OUTPATIENT/OBSERVATION GOALS TO BE MET BEFORE DISCHARGE:  1. ADLs back to baseline: Yes    2. Activity and level of assistance: Up with standby assistance.    3. Pain status: Pain free. Up with gait belt and walker.    4. Return to near baseline physical activity: Yes     Discharge Planner Nurse   Safe discharge environment identified: Yes  Barriers to discharge: Yes       Entered by: Chelsy Mcclain RN 09/30/2022 11:58 AM   Pt had 11AM appt with Tele Stroke provider via video consult. Wife and son are in room with Pt. Pt is alert and oriented x 4. No change in mental status. Neuro assessment performed and is normal.  Please review provider order for any additional goals.   Nurse to notify provider when observation goals have been met and patient is ready for discharge.

## 2022-09-30 NOTE — PLAN OF CARE
PRIMARY DIAGNOSIS: GAIT INSTABILITY, DIZZINESS    OUTPATIENT/OBSERVATION GOALS TO BE MET BEFORE DISCHARGE  1. Orthostatic performed: No    2. Completion of appropriate imaging: Yes, head CT in morning     3. Tolerating PO medications: Yes    4. Return to near baseline physical activity: Yes    5. Cleared for discharge by consultants (if involved): No    Discharge Planner Nurse   Safe discharge environment identified: Yes  Barriers to discharge: Yes       Entered by: Florinda Uribe RN 09/30/2022 2:59 AM     Vitals are Temp: 98.5  F (36.9  C) Temp src: Oral BP: (!) 153/72 Pulse: 66   Resp: 16 SpO2: 95 %.  Patient is Alert and Oriented x4. They are SBA with gait belt and walker.  Pt is a regular diet.  They are denying pain, nausea, and dizziness. All neuros intact. Strength equal and strong. Patient is Saline locked. Will continue to monitor and provide supportive cares.       Please review provider order for any additional goals.   Nurse to notify provider when observation goals have been met and patient is ready for discharge.

## 2022-09-30 NOTE — PLAN OF CARE
PRIMARY DIAGNOSIS: GAIT INSTABILITY, DIZZINESS    OUTPATIENT/OBSERVATION GOALS TO BE MET BEFORE DISCHARGE  1. Orthostatic performed: No    2. Completion of appropriate imaging: Yes, head CT in morning     3. Tolerating PO medications: Yes    4. Return to near baseline physical activity: Yes    5. Cleared for discharge by consultants (if involved): No    Discharge Planner Nurse   Safe discharge environment identified: Yes  Barriers to discharge: Yes       Entered by: Florinad Uribe RN 09/29/2022 9:15 PM     Vitals are Temp: 97.5  F (36.4  C) Temp src: Oral BP: 128/66 Pulse: 64   Resp: 18 SpO2: 99 %.  Patient is Alert and Oriented x4. They are SBA with gait belt and walker.  Pt is a Regular diet.  They are denying pain, nausea, and dizziness. All neuros intact. Strength equal and strong. Patient is Saline locked. Will continue to monitor and provide supportive cares.       Please review provider order for any additional goals.   Nurse to notify provider when observation goals have been met and patient is ready for discharge.

## 2022-10-04 ENCOUNTER — ANCILLARY PROCEDURE (OUTPATIENT)
Dept: CARDIOLOGY | Facility: CLINIC | Age: 87
End: 2022-10-04
Attending: INTERNAL MEDICINE
Payer: COMMERCIAL

## 2022-10-04 ENCOUNTER — TELEPHONE (OUTPATIENT)
Dept: CARDIOLOGY | Facility: CLINIC | Age: 87
End: 2022-10-04

## 2022-10-04 DIAGNOSIS — Z95.0 CARDIAC PACEMAKER IN SITU: ICD-10-CM

## 2022-10-04 DIAGNOSIS — I44.1 SECOND DEGREE ATRIOVENTRICULAR BLOCK: Primary | ICD-10-CM

## 2022-10-04 DIAGNOSIS — I44.1 SECOND DEGREE ATRIOVENTRICULAR BLOCK: ICD-10-CM

## 2022-10-04 PROCEDURE — 93280 PM DEVICE PROGR EVAL DUAL: CPT | Performed by: INTERNAL MEDICINE

## 2022-10-04 NOTE — TELEPHONE ENCOUNTER
----- Message from Eleonora Perry RN sent at 10/4/2022  1:41 PM CDT -----    ----- Message -----  From: Blaze Cain MD  Sent: 10/4/2022  10:00 AM CDT  To: [facility] Heart Ep Nurse    Echo showed new drop in EF, which could be related to pacing.  He should follow-up in clinic to evaluate heart failure symptoms and optimize medical therapy, prior to decide on device upgrade.    Blaze        Called pt and left a VM to call clinic back so we can update him in regards to results above.  Also placed follow-up cardiology appointments.

## 2022-10-06 NOTE — TELEPHONE ENCOUNTER
Pt called back, he is returning call for echo results.       9/30/2022 echo:   Left ventricular systolic function is severely reduced. The visual ejection fraction is 30-35%.....  In direct comparison to the previous study dated 09/20/2022, there has been an interval deterioration in left ventricular systolic function and the patient is status post pacemaker implantation.    9/20/2022 echo:   Left ventricular systolic function is normal. The visual ejection fraction is 55-60%.      Called pt back. Explained echo results and plan for OV. Pt states understanding. Sent message to scheduling to call pt.

## 2022-10-13 ENCOUNTER — OFFICE VISIT (OUTPATIENT)
Dept: NEUROLOGY | Facility: CLINIC | Age: 87
End: 2022-10-13
Attending: PHYSICIAN ASSISTANT
Payer: COMMERCIAL

## 2022-10-13 VITALS
SYSTOLIC BLOOD PRESSURE: 128 MMHG | DIASTOLIC BLOOD PRESSURE: 76 MMHG | OXYGEN SATURATION: 99 % | HEIGHT: 73 IN | HEART RATE: 61 BPM | BODY MASS INDEX: 25.71 KG/M2 | WEIGHT: 194 LBS

## 2022-10-13 DIAGNOSIS — E53.8 VITAMIN B12 DEFICIENCY (NON ANEMIC): ICD-10-CM

## 2022-10-13 DIAGNOSIS — R29.818 DIFFICULTY BALANCING: ICD-10-CM

## 2022-10-13 DIAGNOSIS — I63.9 CEREBROVASCULAR ACCIDENT (CVA), UNSPECIFIED MECHANISM (H): Primary | ICD-10-CM

## 2022-10-13 LAB
MDC_IDC_LEAD_IMPLANT_DT: NORMAL
MDC_IDC_LEAD_IMPLANT_DT: NORMAL
MDC_IDC_LEAD_LOCATION: NORMAL
MDC_IDC_LEAD_LOCATION: NORMAL
MDC_IDC_LEAD_LOCATION_DETAIL_1: NORMAL
MDC_IDC_LEAD_LOCATION_DETAIL_1: NORMAL
MDC_IDC_LEAD_MFG: NORMAL
MDC_IDC_LEAD_MFG: NORMAL
MDC_IDC_LEAD_MODEL: NORMAL
MDC_IDC_LEAD_MODEL: NORMAL
MDC_IDC_LEAD_POLARITY_TYPE: NORMAL
MDC_IDC_LEAD_POLARITY_TYPE: NORMAL
MDC_IDC_LEAD_SERIAL: NORMAL
MDC_IDC_LEAD_SERIAL: NORMAL
MDC_IDC_MSMT_BATTERY_DTM: NORMAL
MDC_IDC_MSMT_BATTERY_REMAINING_LONGEVITY: 150 MO
MDC_IDC_MSMT_BATTERY_REMAINING_PERCENTAGE: 100 %
MDC_IDC_MSMT_BATTERY_STATUS: NORMAL
MDC_IDC_MSMT_LEADCHNL_RA_IMPEDANCE_VALUE: 556 OHM
MDC_IDC_MSMT_LEADCHNL_RA_PACING_THRESHOLD_AMPLITUDE: 0.7 V
MDC_IDC_MSMT_LEADCHNL_RA_PACING_THRESHOLD_PULSEWIDTH: 0.4 MS
MDC_IDC_MSMT_LEADCHNL_RV_IMPEDANCE_VALUE: 781 OHM
MDC_IDC_MSMT_LEADCHNL_RV_PACING_THRESHOLD_AMPLITUDE: 0.6 V
MDC_IDC_MSMT_LEADCHNL_RV_PACING_THRESHOLD_PULSEWIDTH: 0.4 MS
MDC_IDC_PG_IMPLANT_DTM: NORMAL
MDC_IDC_PG_MFG: NORMAL
MDC_IDC_PG_MODEL: NORMAL
MDC_IDC_PG_SERIAL: NORMAL
MDC_IDC_PG_TYPE: NORMAL
MDC_IDC_SESS_CLINIC_NAME: NORMAL
MDC_IDC_SESS_DTM: NORMAL
MDC_IDC_SESS_TYPE: NORMAL
MDC_IDC_SET_BRADY_AT_MODE_SWITCH_MODE: NORMAL
MDC_IDC_SET_BRADY_AT_MODE_SWITCH_RATE: 170 {BEATS}/MIN
MDC_IDC_SET_BRADY_LOWRATE: 60 {BEATS}/MIN
MDC_IDC_SET_BRADY_MAX_SENSOR_RATE: 130 {BEATS}/MIN
MDC_IDC_SET_BRADY_MAX_TRACKING_RATE: 130 {BEATS}/MIN
MDC_IDC_SET_BRADY_MODE: NORMAL
MDC_IDC_SET_BRADY_PAV_DELAY_HIGH: 150 MS
MDC_IDC_SET_BRADY_PAV_DELAY_LOW: 200 MS
MDC_IDC_SET_BRADY_SAV_DELAY_HIGH: 150 MS
MDC_IDC_SET_BRADY_SAV_DELAY_LOW: 200 MS
MDC_IDC_SET_LEADCHNL_RA_PACING_AMPLITUDE: 3.5 V
MDC_IDC_SET_LEADCHNL_RA_PACING_CAPTURE_MODE: NORMAL
MDC_IDC_SET_LEADCHNL_RA_PACING_POLARITY: NORMAL
MDC_IDC_SET_LEADCHNL_RA_PACING_PULSEWIDTH: 0.4 MS
MDC_IDC_SET_LEADCHNL_RA_SENSING_ADAPTATION_MODE: NORMAL
MDC_IDC_SET_LEADCHNL_RA_SENSING_POLARITY: NORMAL
MDC_IDC_SET_LEADCHNL_RA_SENSING_SENSITIVITY: 0.25 MV
MDC_IDC_SET_LEADCHNL_RV_PACING_AMPLITUDE: 1.2 V
MDC_IDC_SET_LEADCHNL_RV_PACING_CAPTURE_MODE: NORMAL
MDC_IDC_SET_LEADCHNL_RV_PACING_POLARITY: NORMAL
MDC_IDC_SET_LEADCHNL_RV_PACING_PULSEWIDTH: 0.4 MS
MDC_IDC_SET_LEADCHNL_RV_SENSING_ADAPTATION_MODE: NORMAL
MDC_IDC_SET_LEADCHNL_RV_SENSING_POLARITY: NORMAL
MDC_IDC_SET_LEADCHNL_RV_SENSING_SENSITIVITY: 1.5 MV
MDC_IDC_SET_ZONE_DETECTION_INTERVAL: 375 MS
MDC_IDC_SET_ZONE_TYPE: NORMAL
MDC_IDC_SET_ZONE_VENDOR_TYPE: NORMAL
MDC_IDC_STAT_AT_BURDEN_PERCENT: 0 %
MDC_IDC_STAT_AT_DTM_END: NORMAL
MDC_IDC_STAT_AT_DTM_START: NORMAL
MDC_IDC_STAT_BRADY_DTM_END: NORMAL
MDC_IDC_STAT_BRADY_DTM_START: NORMAL
MDC_IDC_STAT_BRADY_RA_PERCENT_PACED: 73 %
MDC_IDC_STAT_BRADY_RV_PERCENT_PACED: 99 %
MDC_IDC_STAT_EPISODE_RECENT_COUNT: 0
MDC_IDC_STAT_EPISODE_RECENT_COUNT_DTM_END: NORMAL
MDC_IDC_STAT_EPISODE_RECENT_COUNT_DTM_START: NORMAL
MDC_IDC_STAT_EPISODE_TYPE: NORMAL
MDC_IDC_STAT_EPISODE_VENDOR_TYPE: NORMAL

## 2022-10-13 PROCEDURE — 99215 OFFICE O/P EST HI 40 MIN: CPT | Performed by: PSYCHIATRY & NEUROLOGY

## 2022-10-13 ASSESSMENT — PAIN SCALES - GENERAL: PAINLEVEL: NO PAIN (0)

## 2022-10-13 NOTE — PROGRESS NOTES
ESTABLISHED PATIENT NEUROLOGY NOTE    DATE OF VISIT: 10/13/2022  CLINIC LOCATION: [facility]  MRN: [id number]  PATIENT NAME: Benjamin Elizabeth  YOB: 1935    REASON FOR VISIT:   Chief Complaint   Patient presents with     Stroke     Cerebrovascular accident (CVA)     SUBJECTIVE:                                                      HISTORY OF PRESENT ILLNESS: Patient is new to me, but was previously seen by stroke neurology team.  History and prior evaluation are briefly summarized below.  Please refer to previous neurology notes for more detailed information.    Per chart review, the patient has history of hypertension, hyperlipidemia, anemia, vitamin B12 deficiency, and coronary artery disease.  On 9/29/2022 he presented to [facility] with recurrent dizziness and gait instability.  TTE demonstrated severe hypokinesis of mid to apical segments along with significant reduction of ejection fraction to 30 to 35% versus 55 to 60% on 9/20/2022, when he was initially admitted for dizziness and gait instability.  Initial MRI raised concerns for questionable acute infarct, but on detailed review with neuroradiology, the agreement was reached that it was artifactual.  Given the fact that the patient had chronic cortical infarcts of bilateral frontal and left parietal regions, concerns for embolic events were raised.  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    Head MRI from 9/19/2022 demonstrated equivocal punctate focus of ischemic lacunar infarction in the deep right frontal white matter (later felt artifactual) along with chronic cortical infarct in the left parietal/occipital junction, left middle frontal gyrus, and right middle frontal gyrus along with chronic lacunar infarct in the left cerebellum and right lentiform nucleus.  Head and neck MRA were unremarkable.  Head CT 9/29/2022 was  "negative for acute intracranial pathology.  CT perfusion images were unremarkable.  CTA of head and neck were normal.  MRI of the brain was not repeated due to recent pacemaker placement.  All images were personally reviewed and independently interpreted.    Recent laboratory evaluation demonstrated normal hemoglobin A1C (5.6), LDL of 45 (normal), normal TSH (3.94) and low vitamin B-12 (166).  He was discharged home on B12 injections, but he and his wife are not comfortable to administer them.    At the present time, the patient continues to report intermittent mild balance difficulty, but states that his dizziness completely resolved.  For secondary headache prevention, he is on aspirin and atorvastatin.  He is scheduled to see cardiology in November 2022.  According to inpatient stroke neurology team, should consider anticoagulation if atrial fibrillation is found.  EXAM:                                                    Physical Exam:   Vitals: BP (!) 148/59   Pulse 61   Ht 1.854 m (6' 1\")   Wt 88 kg (194 lb)   SpO2 99%   BMI 25.60 kg/m      General: pt is in NAD, cooperative.  Skin: normal turgor, moist mucous membranes, no lesions/rashes noticed.  HEENT: ATNC, white sclera, normal conjunctiva.  Respiratory: Symmetric lung excursion, no accessory respiratory muscle use.  Abdomen: Non distended.  Neurological: awake, cooperative, follows commands, no aphasia or dysarthria noted, cranial nerves II-XII: no ptosis, extraocular motility is full, face is symmetric, tongue is midline, equally moves all extremities, strength is 5/5 bilaterally in upper and lower extremities, no pronator drift, deep tendon reflexes are equal bilaterally (except achilles reflexes that are absent bilaterally), sensation to the light touch, vibration, and pinprick is intact bilaterally, finger to nose and heel-knee-shin tests are intact bilaterally, Romberg is positive, casual gait is mildly unsteady and cautious.  ASSESSMENT AND " PLAN:                                                    Assessment: 87 year old male patient presents for a follow-up of 2 recent hospitalizations regarding gait instability/dizziness in context of chronic embolic strokes and significant reduction of ejection fraction.  We reviewed the performed in the hospital work-up for stroke as well as recommendations regarding stroke prevention.    I discussed with the patient that balance difficulty is likely multifactorial, related to previous history of cerebellar stroke and vitamin B12 deficiency.  I encouraged the patient to contact his primary care provider clinic to initiate B12 injections with repeat B12 level in 1 to 2 months.    I do not think that follow-up in neurology clinic is necessary unless his symptoms worsen in the future or he develop new neurological symptoms.    Benjamin to follow up with Primary Care provider regarding elevated blood pressure.     Diagnoses:    ICD-10-CM    1. Cerebrovascular accident (CVA), unspecified mechanism (H)  I63.9 Stroke Hospital Follow Up      2. Vitamin B12 deficiency (non anemic)  E53.8       3. Difficulty balancing  R29.818         Plan: At today's visit we thoroughly discussed current symptoms, results of stroke evaluation, symptoms and signs of stroke, secondary stroke prevention measures, and the plan.    Symptoms and signs of stroke were discussed.  The patient was advised to call 911 with any SUDDEN onset of weakness, vision loss, speech problems, numbness, or severe headache of unknown cause.    For secondary stroke prevention, I counseled the patient to:  -Continue aspirin 81 mg daily unless his cardiac monitoring demonstrates atrial fibrillation.  In such case, aspirin needs to be switched to oral anticoagulation under the guidance of his primary care provider or cardiologist  -Continue atorvastatin with listed below long-term LDL goals  -Long-term blood pressure goal is less than 130/85  -Long-term LDL goal is  40-70  -Long-term goal of hemoglobin A1C is less than 7.0  -Low-salt low-fat diet  -Regular aerobic exercise 30 minutes 3-4 times per week    Regarding his balance difficulty, I advised the patient to start vitamin B12 injections as recommended in the hospital and recheck vitamin B12 level in 1 to 2 months after that through his primary care provider office.    Next follow-up appointment is on as needed basis.    Total Time: 61 minutes spent on the date of the encounter doing chart review, history and exam, documentation and further activities per the note.    Alexandre Carr MD  [facility]  (Chart documentation was completed in part with Dragon voice-recognition software. Even though reviewed, some grammatical, spelling, and word errors may remain.)

## 2022-10-13 NOTE — PATIENT INSTRUCTIONS
AFTER VISIT SUMMARY (AVS):    At today's visit we thoroughly discussed current symptoms, results of stroke evaluation, symptoms and signs of stroke, secondary stroke prevention measures, and the plan.    Symptoms and signs of stroke were discussed.  You should call 911 with any SUDDEN onset of weakness, vision loss, speech problems, numbness, or severe headache of unknown cause.    For secondary stroke prevention:  -Continue aspirin 81 mg daily unless your cardiac monitoring demonstrates atrial fibrillation.  In such case aspirin needs to be switched to oral anticoagulation under the guidance of your primary care provider or cardiologist  -Continue atorvastatin with listed below long-term LDL goals  -Long-term blood pressure goal is less than 130/85  -Long-term LDL goal is 40-70  -Long-term goal of hemoglobin A1C is less than 7.0  -Low-salt low-fat diet  -Regular aerobic exercise 30 minutes 3-4 times per week    Please start vitamin B12 injections as recommended in the hospital and recheck your vitamin B12 level in 1 to 2 months after that through your primary care provider.    Next follow-up appointment is on as needed basis.    Please do not hesitate to call me with any questions or concerns.    Thanks.

## 2022-10-13 NOTE — LETTER
10/13/2022         RE: Benjamin Elizabeth  [address] [place] [address]        Dear Colleague,    Thank you for referring your patient, Benjamin Elizabeth, to the [facility]. Please see a copy of my visit note below.    ESTABLISHED PATIENT NEUROLOGY NOTE    DATE OF VISIT: 10/13/2022  CLINIC LOCATION: [facility]  MRN: [id number]  PATIENT NAME: Benjamin Elizabeth  YOB: 1935    REASON FOR VISIT:   Chief Complaint   Patient presents with     Stroke     Cerebrovascular accident (CVA)     SUBJECTIVE:                                                      HISTORY OF PRESENT ILLNESS: Patient is new to me, but was previously seen by stroke neurology team.  History and prior evaluation are briefly summarized below.  Please refer to previous neurology notes for more detailed information.    Per chart review, the patient has history of hypertension, hyperlipidemia, anemia, vitamin B12 deficiency, and coronary artery disease.  On 9/29/2022 he presented to [facility] with recurrent dizziness and gait instability.  TTE demonstrated severe hypokinesis of mid to apical segments along with significant reduction of ejection fraction to 30 to 35% versus 55 to 60% on 9/20/2022, when he was initially admitted for dizziness and gait instability.  Initial MRI raised concerns for questionable acute infarct, but on detailed review with neuroradiology, the agreement was reached that it was artifactual.  Given the fact that the patient had chronic cortical infarcts of bilateral frontal and left parietal regions, concerns for embolic events were raised.  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    Head MRI from 9/19/2022 demonstrated equivocal punctate focus of ischemic lacunar infarction in the deep right frontal white matter (later felt artifactual) along with chronic cortical  "infarct in the left parietal/occipital junction, left middle frontal gyrus, and right middle frontal gyrus along with chronic lacunar infarct in the left cerebellum and right lentiform nucleus.  Head and neck MRA were unremarkable.  Head CT 9/29/2022 was negative for acute intracranial pathology.  CT perfusion images were unremarkable.  CTA of head and neck were normal.  MRI of the brain was not repeated due to recent pacemaker placement.  All images were personally reviewed and independently interpreted.    Recent laboratory evaluation demonstrated normal hemoglobin A1C (5.6), LDL of 45 (normal), normal TSH (3.94) and low vitamin B-12 (166).  He was discharged home on B12 injections, but he and his wife are not comfortable to administer them.    At the present time, the patient continues to report intermittent mild balance difficulty, but states that his dizziness completely resolved.  For secondary headache prevention, he is on aspirin and atorvastatin.  He is scheduled to see cardiology in November 2022.  According to inpatient stroke neurology team, should consider anticoagulation if atrial fibrillation is found.  EXAM:                                                    Physical Exam:   Vitals: BP (!) 148/59   Pulse 61   Ht 1.854 m (6' 1\")   Wt 88 kg (194 lb)   SpO2 99%   BMI 25.60 kg/m      General: pt is in NAD, cooperative.  Skin: normal turgor, moist mucous membranes, no lesions/rashes noticed.  HEENT: ATNC, white sclera, normal conjunctiva.  Respiratory: Symmetric lung excursion, no accessory respiratory muscle use.  Abdomen: Non distended.  Neurological: awake, cooperative, follows commands, no aphasia or dysarthria noted, cranial nerves II-XII: no ptosis, extraocular motility is full, face is symmetric, tongue is midline, equally moves all extremities, strength is 5/5 bilaterally in upper and lower extremities, no pronator drift, deep tendon reflexes are equal bilaterally (except achilles reflexes " that are absent bilaterally), sensation to the light touch, vibration, and pinprick is intact bilaterally, finger to nose and heel-knee-shin tests are intact bilaterally, Romberg is positive, casual gait is mildly unsteady and cautious.  ASSESSMENT AND PLAN:                                                    Assessment: 87 year old male patient presents for a follow-up of 2 recent hospitalizations regarding gait instability/dizziness in context of chronic embolic strokes and significant reduction of ejection fraction.  We reviewed the performed in the hospital work-up for stroke as well as recommendations regarding stroke prevention.    I discussed with the patient that balance difficulty is likely multifactorial, related to previous history of cerebellar stroke and vitamin B12 deficiency.  I encouraged the patient to contact his primary care provider clinic to initiate B12 injections with repeat B12 level in 1 to 2 months.    I do not think that follow-up in neurology clinic is necessary unless his symptoms worsen in the future or he develop new neurological symptoms.    Benjamin to follow up with Primary Care provider regarding elevated blood pressure.     Diagnoses:    ICD-10-CM    1. Cerebrovascular accident (CVA), unspecified mechanism (H)  I63.9 Stroke Hospital Follow Up      2. Vitamin B12 deficiency (non anemic)  E53.8       3. Difficulty balancing  R29.818         Plan: At today's visit we thoroughly discussed current symptoms, results of stroke evaluation, symptoms and signs of stroke, secondary stroke prevention measures, and the plan.    Symptoms and signs of stroke were discussed.  The patient was advised to call 911 with any SUDDEN onset of weakness, vision loss, speech problems, numbness, or severe headache of unknown cause.    For secondary stroke prevention, I counseled the patient to:  -Continue aspirin 81 mg daily unless his cardiac monitoring demonstrates atrial fibrillation.  In such case, aspirin  needs to be switched to oral anticoagulation under the guidance of his primary care provider or cardiologist  -Continue atorvastatin with listed below long-term LDL goals  -Long-term blood pressure goal is less than 130/85  -Long-term LDL goal is 40-70  -Long-term goal of hemoglobin A1C is less than 7.0  -Low-salt low-fat diet  -Regular aerobic exercise 30 minutes 3-4 times per week    Regarding his balance difficulty, I advised the patient to start vitamin B12 injections as recommended in the hospital and recheck vitamin B12 level in 1 to 2 months after that through his primary care provider office.    Next follow-up appointment is on as needed basis.    Total Time: 61 minutes spent on the date of the encounter doing chart review, history and exam, documentation and further activities per the note.    Alexandre Carr MD  [facility]  (Chart documentation was completed in part with Dragon voice-recognition software. Even though reviewed, some grammatical, spelling, and word errors may remain.)       Again, thank you for allowing me to participate in the care of your patient.        Sincerely,        Alexandre Carr MD

## 2022-11-16 ENCOUNTER — ANCILLARY PROCEDURE (OUTPATIENT)
Dept: CARDIOLOGY | Facility: CLINIC | Age: 87
End: 2022-11-16
Attending: INTERNAL MEDICINE
Payer: COMMERCIAL

## 2022-11-16 DIAGNOSIS — Z95.0 CARDIAC PACEMAKER IN SITU: ICD-10-CM

## 2022-11-16 DIAGNOSIS — I44.1 SECOND DEGREE ATRIOVENTRICULAR BLOCK: ICD-10-CM

## 2022-11-16 PROCEDURE — 93280 PM DEVICE PROGR EVAL DUAL: CPT | Performed by: INTERNAL MEDICINE

## 2022-11-18 ENCOUNTER — OFFICE VISIT (OUTPATIENT)
Dept: CARDIOLOGY | Facility: CLINIC | Age: 87
End: 2022-11-18
Payer: COMMERCIAL

## 2022-11-18 VITALS
WEIGHT: 199.9 LBS | BODY MASS INDEX: 26.49 KG/M2 | SYSTOLIC BLOOD PRESSURE: 130 MMHG | HEART RATE: 60 BPM | DIASTOLIC BLOOD PRESSURE: 70 MMHG | HEIGHT: 73 IN

## 2022-11-18 DIAGNOSIS — Z95.0 CARDIAC PACEMAKER IN SITU: ICD-10-CM

## 2022-11-18 DIAGNOSIS — I44.1 SECOND DEGREE ATRIOVENTRICULAR BLOCK: ICD-10-CM

## 2022-11-18 PROCEDURE — 99024 POSTOP FOLLOW-UP VISIT: CPT | Performed by: INTERNAL MEDICINE

## 2022-11-18 NOTE — LETTER
"11/18/2022    [facility]  [address] [place] [address], [address]  [facility] [address]    RE: Benjamin Elizabeth       Dear Colleague,     I had the pleasure of seeing Benjamin PEDRO Elizabeth in the [facility].  Electrophysiology/ Clinic Note         H&P and Plan:     REASON FOR VISIT: Electrophysiology evaluation.      HISTORY OF PRESENT ILLNESS: Patient is a pleasant 87-year old lady with a history of hypertension, hyperlipidemia, and ascending aortic root dilation, who underwent pacemaker implantation (90/22/2022) due to second-degree AV block and is here today for routine evaluation.      Today, he informs he is doing well.  He has no complaints during this visit.  He denies any sense of chest pain, shortness of breath, lightheadedness, near-syncope or syncope.    Device was interrogated 11/16/2022.  He is atrially paced 64% time ventricular paced 99% of the time.  Lead parameters are stable.     PREVIOUS STUDIES (personally reviewed):  -Cath (11/2019): Minimal nonobstructive disease.  -Echo (90/20/22): Normal V function.  EF of 55 to 60%.  Mild MR/TR.     ASSESSMENT AND PLAN:   1.  Device care.  Continue device checks every 3 months.    2.  Hypertension.  Continue therapy with metoprolol and losartan.    Blaze Cain MD    Physical Exam:  Vitals: /70   Pulse 60   Ht 1.854 m (6' 1\")   Wt 90.7 kg (199 lb 14.4 oz)   BMI 26.37 kg/m      Constitutional:  AAO x3.  Pt is in NAD.  HEAD: normocephalic.  SKIN: Skin normal color, texture and turgor with no lesions or eruptions.  Eyes: PERRL, EOMI.  ENT:  Supple, normal JVP. No lymphadenopathy or thyroid enlargement.  Chest:  CTAB.  Cardiac:   RRR, normal  S1 and S2.  No murmurs rubs or gallop.    Abdomen:  Normal BS.  Soft, non-tender and non-distended.  No rebound or guarding.    Extremities:  Pedious pulses palpable B/L.  No LE edema noticed.   Neurological: Strength and sensation grossly symmetric and intact throughout.       CURRENT " MEDICATIONS:  Current Outpatient Medications   Medication Sig Dispense Refill     aspirin 81 MG EC tablet Take 81 mg by mouth daily (with dinner)       atorvastatin (LIPITOR) 40 MG tablet Take 40 mg by mouth daily (with dinner)       losartan (COZAAR) 25 MG tablet Take 37.5 mg by mouth daily (with dinner)       metoprolol succinate ER (TOPROL XL) 25 MG 24 hr tablet Take 0.5 tablets (12.5 mg) by mouth daily 30 tablet 0     multivitamin, therapeutic (THERA-VIT) TABS tablet Take 1 tablet by mouth daily (with dinner)         ALLERGIES   No Known Allergies    PAST MEDICAL HISTORY:  No past medical history on file.    PAST SURGICAL HISTORY:  Past Surgical History:   Procedure Laterality Date     EP PACEMAKER DEVICE & LEAD IMPLANT- RIGHT ATRIAL & LEFT VENTRICULAR N/A 9/22/2022    Procedure: Pacemaker Device & Lead Implant- Right Atrial & Left Ventricular;  Surgeon: Blaze Cain MD;  Location:  HEART CARDIAC CATH LAB       FAMILY HISTORY:  The patient's family history was reviewed and is non-contributory for heart disease.    SOCIAL HISTORY:  Social History     Socioeconomic History     Marital status:      Spouse name: None     Number of children: None     Years of education: None     Highest education level: None   Tobacco Use     Smoking status: Never     Smokeless tobacco: Never       Review of Systems:  Skin:  not assessed     Eyes:  not assessed    ENT:  not assessed    Respiratory:  Negative    Cardiovascular:    Positive for;palpitations;edema  Gastroenterology: not assessed    Genitourinary:  not assessed    Musculoskeletal:  not assessed    Neurologic:  not assessed    Psychiatric:  not assessed    Heme/Lymph/Imm:  not assessed    Endocrine:  Negative        Recent Lab Results:  LIPID RESULTS:  Lab Results   Component Value Date    CHOL 117 09/19/2022    HDL 63 09/19/2022    LDL 45 09/19/2022    TRIG 46 09/19/2022       LIVER ENZYME RESULTS:  Lab Results   Component Value Date    AST 28  09/29/2022    ALT 10 09/29/2022       CBC RESULTS:  Lab Results   Component Value Date    WBC 4.4 09/30/2022    RBC 3.74 (L) 09/30/2022    HGB 11.4 (L) 09/30/2022    HCT 36.1 (L) 09/30/2022    MCV 97 09/30/2022    MCH 30.5 09/30/2022    MCHC 31.6 09/30/2022    RDW 13.8 09/30/2022     (L) 09/30/2022       BMP RESULTS:  Lab Results   Component Value Date     09/30/2022    POTASSIUM 4.3 09/30/2022    POTASSIUM 4.0 09/22/2022    CHLORIDE 105 09/30/2022    CHLORIDE 110 (H) 09/22/2022    CO2 27 09/30/2022    CO2 24 09/22/2022    ANIONGAP 6 (L) 09/30/2022    ANIONGAP 5 09/22/2022     (H) 09/30/2022     (H) 09/22/2022    BUN 17.8 09/30/2022    BUN 21 09/22/2022    CR 0.92 09/30/2022    GFRESTIMATED 82 09/30/2022    ERASTO 8.4 (L) 09/30/2022        A1C RESULTS:  Lab Results   Component Value Date    A1C 5.6 09/19/2022       INR RESULTS:  Lab Results   Component Value Date    INR 1.12 09/29/2022    INR 1.07 09/21/2022         ECHOCARDIOGRAM  No results found for this or any previous visit (from the past 8760 hour(s)).      No orders of the defined types were placed in this encounter.    No orders of the defined types were placed in this encounter.    There are no discontinued medications.      Encounter Diagnoses   Name Primary?     Second degree atrioventricular block      Cardiac pacemaker in situ          CC  Referred Self,     Thank you for allowing me to participate in the care of your patient.      Sincerely,     Blaze Cain MD     [facility]

## 2022-11-18 NOTE — PROGRESS NOTES
"Electrophysiology/ Clinic Note         H&P and Plan:     REASON FOR VISIT: Electrophysiology evaluation.      HISTORY OF PRESENT ILLNESS: Patient is a pleasant 87-year old lady with a history of hypertension, hyperlipidemia, and ascending aortic root dilation, who underwent pacemaker implantation (90/22/2022) due to second-degree AV block and is here today for routine evaluation.      Today, he informs he is doing well.  He has no complaints during this visit.  He denies any sense of chest pain, shortness of breath, lightheadedness, near-syncope or syncope.    Device was interrogated 11/16/2022.  He is atrially paced 64% time ventricular paced 99% of the time.  Lead parameters are stable.     PREVIOUS STUDIES (personally reviewed):  -Cath (11/2019): Minimal nonobstructive disease.  -Echo (90/20/22): Normal V function.  EF of 55 to 60%.  Mild MR/TR.     ASSESSMENT AND PLAN:   1.  Device care.  Continue device checks every 3 months.    2.  Hypertension.  Continue therapy with metoprolol and losartan.    Blaze Cain MD    Physical Exam:  Vitals: /70   Pulse 60   Ht 1.854 m (6' 1\")   Wt 90.7 kg (199 lb 14.4 oz)   BMI 26.37 kg/m      Constitutional:  AAO x3.  Pt is in NAD.  HEAD: normocephalic.  SKIN: Skin normal color, texture and turgor with no lesions or eruptions.  Eyes: PERRL, EOMI.  ENT:  Supple, normal JVP. No lymphadenopathy or thyroid enlargement.  Chest:  CTAB.  Cardiac:   RRR, normal  S1 and S2.  No murmurs rubs or gallop.    Abdomen:  Normal BS.  Soft, non-tender and non-distended.  No rebound or guarding.    Extremities:  Pedious pulses palpable B/L.  No LE edema noticed.   Neurological: Strength and sensation grossly symmetric and intact throughout.       CURRENT MEDICATIONS:  Current Outpatient Medications   Medication Sig Dispense Refill     aspirin 81 MG EC tablet Take 81 mg by mouth daily (with dinner)       atorvastatin (LIPITOR) 40 MG tablet Take 40 mg by mouth daily (with dinner)       " losartan (COZAAR) 25 MG tablet Take 37.5 mg by mouth daily (with dinner)       metoprolol succinate ER (TOPROL XL) 25 MG 24 hr tablet Take 0.5 tablets (12.5 mg) by mouth daily 30 tablet 0     multivitamin, therapeutic (THERA-VIT) TABS tablet Take 1 tablet by mouth daily (with dinner)         ALLERGIES   No Known Allergies    PAST MEDICAL HISTORY:  No past medical history on file.    PAST SURGICAL HISTORY:  Past Surgical History:   Procedure Laterality Date     EP PACEMAKER DEVICE & LEAD IMPLANT- RIGHT ATRIAL & LEFT VENTRICULAR N/A 9/22/2022    Procedure: Pacemaker Device & Lead Implant- Right Atrial & Left Ventricular;  Surgeon: Blaze Cain MD;  Location:  [facility]       FAMILY HISTORY:  The patient's family history was reviewed and is non-contributory for heart disease.    SOCIAL HISTORY:  Social History     Socioeconomic History     Marital status:      Spouse name: None     Number of children: None     Years of education: None     Highest education level: None   Tobacco Use     Smoking status: Never     Smokeless tobacco: Never       Review of Systems:  Skin:  not assessed     Eyes:  not assessed    ENT:  not assessed    Respiratory:  Negative    Cardiovascular:    Positive for;palpitations;edema  Gastroenterology: not assessed    Genitourinary:  not assessed    Musculoskeletal:  not assessed    Neurologic:  not assessed    Psychiatric:  not assessed    Heme/Lymph/Imm:  not assessed    Endocrine:  Negative        Recent Lab Results:  LIPID RESULTS:  Lab Results   Component Value Date    CHOL 117 09/19/2022    HDL 63 09/19/2022    LDL 45 09/19/2022    TRIG 46 09/19/2022       LIVER ENZYME RESULTS:  Lab Results   Component Value Date    AST 28 09/29/2022    ALT 10 09/29/2022       CBC RESULTS:  Lab Results   Component Value Date    WBC 4.4 09/30/2022    RBC 3.74 (L) 09/30/2022    HGB 11.4 (L) 09/30/2022    HCT 36.1 (L) 09/30/2022    MCV 97 09/30/2022    MCH 30.5 09/30/2022    MCHC  31.6 09/30/2022    RDW 13.8 09/30/2022     (L) 09/30/2022       BMP RESULTS:  Lab Results   Component Value Date     09/30/2022    POTASSIUM 4.3 09/30/2022    POTASSIUM 4.0 09/22/2022    CHLORIDE 105 09/30/2022    CHLORIDE 110 (H) 09/22/2022    CO2 27 09/30/2022    CO2 24 09/22/2022    ANIONGAP 6 (L) 09/30/2022    ANIONGAP 5 09/22/2022     (H) 09/30/2022     (H) 09/22/2022    BUN 17.8 09/30/2022    BUN 21 09/22/2022    CR 0.92 09/30/2022    GFRESTIMATED 82 09/30/2022    ERASTO 8.4 (L) 09/30/2022        A1C RESULTS:  Lab Results   Component Value Date    A1C 5.6 09/19/2022       INR RESULTS:  Lab Results   Component Value Date    INR 1.12 09/29/2022    INR 1.07 09/21/2022         ECHOCARDIOGRAM  No results found for this or any previous visit (from the past 8760 hour(s)).      No orders of the defined types were placed in this encounter.    No orders of the defined types were placed in this encounter.    There are no discontinued medications.      Encounter Diagnoses   Name Primary?     Second degree atrioventricular block      Cardiac pacemaker in situ          CC  Referred Self, MD  No address on file

## 2022-11-18 NOTE — PATIENT INSTRUCTIONS
Your blood pressure was elevated at your appointment today.  Elevated blood pressure can increase your risk of a heart attack, stroke and heart failure.  For this reason, we feel it is important to monitor your blood pressure closely.  If you have access to a home blood pressure monitor or are able to check your blood pressure at a local pharmacy in the next week we would like you to do so and call our clinic with those readings. Please call [phone] (Candace) and leave a message with your name, date of birth, blood pressure reading, date and location it was completed. If your blood pressure remains elevated your care team will be notified.  We appreciate being a part of your healthcare team and look forward to hearing from you soon.      Shanae Hamilton Paramjit Concepcion

## 2022-11-23 LAB
MDC_IDC_LEAD_IMPLANT_DT: NORMAL
MDC_IDC_LEAD_IMPLANT_DT: NORMAL
MDC_IDC_LEAD_LOCATION: NORMAL
MDC_IDC_LEAD_LOCATION: NORMAL
MDC_IDC_LEAD_LOCATION_DETAIL_1: NORMAL
MDC_IDC_LEAD_LOCATION_DETAIL_1: NORMAL
MDC_IDC_LEAD_MFG: NORMAL
MDC_IDC_LEAD_MFG: NORMAL
MDC_IDC_LEAD_MODEL: NORMAL
MDC_IDC_LEAD_MODEL: NORMAL
MDC_IDC_LEAD_POLARITY_TYPE: NORMAL
MDC_IDC_LEAD_POLARITY_TYPE: NORMAL
MDC_IDC_LEAD_SERIAL: NORMAL
MDC_IDC_LEAD_SERIAL: NORMAL
MDC_IDC_MSMT_BATTERY_DTM: NORMAL
MDC_IDC_MSMT_BATTERY_REMAINING_LONGEVITY: 144 MO
MDC_IDC_MSMT_BATTERY_REMAINING_PERCENTAGE: 100 %
MDC_IDC_MSMT_BATTERY_STATUS: NORMAL
MDC_IDC_MSMT_LEADCHNL_RA_IMPEDANCE_VALUE: 600 OHM
MDC_IDC_MSMT_LEADCHNL_RA_PACING_THRESHOLD_AMPLITUDE: 1.1 V
MDC_IDC_MSMT_LEADCHNL_RA_PACING_THRESHOLD_PULSEWIDTH: 0.4 MS
MDC_IDC_MSMT_LEADCHNL_RV_IMPEDANCE_VALUE: 1012 OHM
MDC_IDC_MSMT_LEADCHNL_RV_PACING_THRESHOLD_AMPLITUDE: 0.6 V
MDC_IDC_MSMT_LEADCHNL_RV_PACING_THRESHOLD_PULSEWIDTH: 0.4 MS
MDC_IDC_PG_IMPLANT_DTM: NORMAL
MDC_IDC_PG_MFG: NORMAL
MDC_IDC_PG_MODEL: NORMAL
MDC_IDC_PG_SERIAL: NORMAL
MDC_IDC_PG_TYPE: NORMAL
MDC_IDC_SESS_CLINIC_NAME: NORMAL
MDC_IDC_SESS_DTM: NORMAL
MDC_IDC_SESS_TYPE: NORMAL
MDC_IDC_SET_BRADY_AT_MODE_SWITCH_MODE: NORMAL
MDC_IDC_SET_BRADY_AT_MODE_SWITCH_RATE: 170 {BEATS}/MIN
MDC_IDC_SET_BRADY_LOWRATE: 60 {BEATS}/MIN
MDC_IDC_SET_BRADY_MAX_SENSOR_RATE: 130 {BEATS}/MIN
MDC_IDC_SET_BRADY_MAX_TRACKING_RATE: 130 {BEATS}/MIN
MDC_IDC_SET_BRADY_MODE: NORMAL
MDC_IDC_SET_BRADY_PAV_DELAY_HIGH: 150 MS
MDC_IDC_SET_BRADY_PAV_DELAY_LOW: 200 MS
MDC_IDC_SET_BRADY_SAV_DELAY_HIGH: 150 MS
MDC_IDC_SET_BRADY_SAV_DELAY_LOW: 200 MS
MDC_IDC_SET_LEADCHNL_RA_PACING_AMPLITUDE: 3.5 V
MDC_IDC_SET_LEADCHNL_RA_PACING_CAPTURE_MODE: NORMAL
MDC_IDC_SET_LEADCHNL_RA_PACING_POLARITY: NORMAL
MDC_IDC_SET_LEADCHNL_RA_PACING_PULSEWIDTH: 0.4 MS
MDC_IDC_SET_LEADCHNL_RA_SENSING_ADAPTATION_MODE: NORMAL
MDC_IDC_SET_LEADCHNL_RA_SENSING_POLARITY: NORMAL
MDC_IDC_SET_LEADCHNL_RA_SENSING_SENSITIVITY: 0.25 MV
MDC_IDC_SET_LEADCHNL_RV_PACING_AMPLITUDE: 1.1 V
MDC_IDC_SET_LEADCHNL_RV_PACING_CAPTURE_MODE: NORMAL
MDC_IDC_SET_LEADCHNL_RV_PACING_POLARITY: NORMAL
MDC_IDC_SET_LEADCHNL_RV_PACING_PULSEWIDTH: 0.4 MS
MDC_IDC_SET_LEADCHNL_RV_SENSING_ADAPTATION_MODE: NORMAL
MDC_IDC_SET_LEADCHNL_RV_SENSING_POLARITY: NORMAL
MDC_IDC_SET_LEADCHNL_RV_SENSING_SENSITIVITY: 1.5 MV
MDC_IDC_SET_ZONE_DETECTION_INTERVAL: 375 MS
MDC_IDC_SET_ZONE_TYPE: NORMAL
MDC_IDC_SET_ZONE_VENDOR_TYPE: NORMAL
MDC_IDC_STAT_AT_BURDEN_PERCENT: 0 %
MDC_IDC_STAT_AT_DTM_END: NORMAL
MDC_IDC_STAT_AT_DTM_START: NORMAL
MDC_IDC_STAT_BRADY_DTM_END: NORMAL
MDC_IDC_STAT_BRADY_DTM_START: NORMAL
MDC_IDC_STAT_BRADY_RA_PERCENT_PACED: 64 %
MDC_IDC_STAT_BRADY_RV_PERCENT_PACED: 99 %
MDC_IDC_STAT_EPISODE_RECENT_COUNT: 0
MDC_IDC_STAT_EPISODE_RECENT_COUNT_DTM_END: NORMAL
MDC_IDC_STAT_EPISODE_RECENT_COUNT_DTM_START: NORMAL
MDC_IDC_STAT_EPISODE_TYPE: NORMAL
MDC_IDC_STAT_EPISODE_VENDOR_TYPE: NORMAL

## 2023-02-21 ENCOUNTER — ANCILLARY PROCEDURE (OUTPATIENT)
Dept: CARDIOLOGY | Facility: CLINIC | Age: 88
End: 2023-02-21
Attending: INTERNAL MEDICINE
Payer: COMMERCIAL

## 2023-02-21 DIAGNOSIS — Z95.0 CARDIAC PACEMAKER IN SITU: ICD-10-CM

## 2023-02-21 PROCEDURE — 93294 REM INTERROG EVL PM/LDLS PM: CPT | Performed by: INTERNAL MEDICINE

## 2023-02-21 PROCEDURE — 93296 REM INTERROG EVL PM/IDS: CPT | Performed by: INTERNAL MEDICINE

## 2023-02-23 LAB
MDC_IDC_EPISODE_DTM: NORMAL
MDC_IDC_EPISODE_ID: NORMAL
MDC_IDC_EPISODE_TYPE: NORMAL
MDC_IDC_LEAD_IMPLANT_DT: NORMAL
MDC_IDC_LEAD_IMPLANT_DT: NORMAL
MDC_IDC_LEAD_LOCATION: NORMAL
MDC_IDC_LEAD_LOCATION: NORMAL
MDC_IDC_LEAD_LOCATION_DETAIL_1: NORMAL
MDC_IDC_LEAD_LOCATION_DETAIL_1: NORMAL
MDC_IDC_LEAD_MFG: NORMAL
MDC_IDC_LEAD_MFG: NORMAL
MDC_IDC_LEAD_MODEL: NORMAL
MDC_IDC_LEAD_MODEL: NORMAL
MDC_IDC_LEAD_POLARITY_TYPE: NORMAL
MDC_IDC_LEAD_POLARITY_TYPE: NORMAL
MDC_IDC_LEAD_SERIAL: NORMAL
MDC_IDC_LEAD_SERIAL: NORMAL
MDC_IDC_MSMT_BATTERY_DTM: NORMAL
MDC_IDC_MSMT_BATTERY_REMAINING_LONGEVITY: 138 MO
MDC_IDC_MSMT_BATTERY_REMAINING_PERCENTAGE: 100 %
MDC_IDC_MSMT_BATTERY_STATUS: NORMAL
MDC_IDC_MSMT_LEADCHNL_RA_IMPEDANCE_VALUE: 595 OHM
MDC_IDC_MSMT_LEADCHNL_RA_PACING_THRESHOLD_AMPLITUDE: 0.4 V
MDC_IDC_MSMT_LEADCHNL_RA_PACING_THRESHOLD_PULSEWIDTH: 0.4 MS
MDC_IDC_MSMT_LEADCHNL_RV_IMPEDANCE_VALUE: 936 OHM
MDC_IDC_MSMT_LEADCHNL_RV_PACING_THRESHOLD_AMPLITUDE: 0.6 V
MDC_IDC_MSMT_LEADCHNL_RV_PACING_THRESHOLD_PULSEWIDTH: 0.4 MS
MDC_IDC_PG_IMPLANT_DTM: NORMAL
MDC_IDC_PG_MFG: NORMAL
MDC_IDC_PG_MODEL: NORMAL
MDC_IDC_PG_SERIAL: NORMAL
MDC_IDC_PG_TYPE: NORMAL
MDC_IDC_SESS_CLINIC_NAME: NORMAL
MDC_IDC_SESS_DTM: NORMAL
MDC_IDC_SESS_TYPE: NORMAL
MDC_IDC_SET_BRADY_AT_MODE_SWITCH_MODE: NORMAL
MDC_IDC_SET_BRADY_AT_MODE_SWITCH_RATE: 170 {BEATS}/MIN
MDC_IDC_SET_BRADY_LOWRATE: 60 {BEATS}/MIN
MDC_IDC_SET_BRADY_MAX_SENSOR_RATE: 130 {BEATS}/MIN
MDC_IDC_SET_BRADY_MAX_TRACKING_RATE: 130 {BEATS}/MIN
MDC_IDC_SET_BRADY_MODE: NORMAL
MDC_IDC_SET_BRADY_PAV_DELAY_HIGH: 150 MS
MDC_IDC_SET_BRADY_PAV_DELAY_LOW: 200 MS
MDC_IDC_SET_BRADY_SAV_DELAY_HIGH: 150 MS
MDC_IDC_SET_BRADY_SAV_DELAY_LOW: 200 MS
MDC_IDC_SET_LEADCHNL_RA_PACING_AMPLITUDE: 3.5 V
MDC_IDC_SET_LEADCHNL_RA_PACING_CAPTURE_MODE: NORMAL
MDC_IDC_SET_LEADCHNL_RA_PACING_POLARITY: NORMAL
MDC_IDC_SET_LEADCHNL_RA_PACING_PULSEWIDTH: 0.4 MS
MDC_IDC_SET_LEADCHNL_RA_SENSING_ADAPTATION_MODE: NORMAL
MDC_IDC_SET_LEADCHNL_RA_SENSING_POLARITY: NORMAL
MDC_IDC_SET_LEADCHNL_RA_SENSING_SENSITIVITY: 0.25 MV
MDC_IDC_SET_LEADCHNL_RV_PACING_AMPLITUDE: 1.1 V
MDC_IDC_SET_LEADCHNL_RV_PACING_CAPTURE_MODE: NORMAL
MDC_IDC_SET_LEADCHNL_RV_PACING_POLARITY: NORMAL
MDC_IDC_SET_LEADCHNL_RV_PACING_PULSEWIDTH: 0.4 MS
MDC_IDC_SET_LEADCHNL_RV_SENSING_ADAPTATION_MODE: NORMAL
MDC_IDC_SET_LEADCHNL_RV_SENSING_POLARITY: NORMAL
MDC_IDC_SET_LEADCHNL_RV_SENSING_SENSITIVITY: 1.5 MV
MDC_IDC_SET_ZONE_DETECTION_INTERVAL: 375 MS
MDC_IDC_SET_ZONE_TYPE: NORMAL
MDC_IDC_SET_ZONE_VENDOR_TYPE: NORMAL
MDC_IDC_STAT_AT_BURDEN_PERCENT: 0 %
MDC_IDC_STAT_AT_DTM_END: NORMAL
MDC_IDC_STAT_AT_DTM_START: NORMAL
MDC_IDC_STAT_BRADY_DTM_END: NORMAL
MDC_IDC_STAT_BRADY_DTM_START: NORMAL
MDC_IDC_STAT_BRADY_RA_PERCENT_PACED: 65 %
MDC_IDC_STAT_BRADY_RV_PERCENT_PACED: 99 %
MDC_IDC_STAT_EPISODE_RECENT_COUNT: 0
MDC_IDC_STAT_EPISODE_RECENT_COUNT_DTM_END: NORMAL
MDC_IDC_STAT_EPISODE_RECENT_COUNT_DTM_START: NORMAL
MDC_IDC_STAT_EPISODE_TYPE: NORMAL
MDC_IDC_STAT_EPISODE_VENDOR_TYPE: NORMAL

## 2023-05-01 ENCOUNTER — OFFICE VISIT (OUTPATIENT)
Dept: URGENT CARE | Facility: URGENT CARE | Age: 88
End: 2023-05-01
Payer: COMMERCIAL

## 2023-05-01 ENCOUNTER — ANCILLARY PROCEDURE (OUTPATIENT)
Dept: GENERAL RADIOLOGY | Facility: CLINIC | Age: 88
End: 2023-05-01
Attending: PHYSICIAN ASSISTANT
Payer: COMMERCIAL

## 2023-05-01 VITALS
TEMPERATURE: 97.7 F | WEIGHT: 200.6 LBS | BODY MASS INDEX: 26.47 KG/M2 | DIASTOLIC BLOOD PRESSURE: 73 MMHG | OXYGEN SATURATION: 99 % | HEART RATE: 62 BPM | SYSTOLIC BLOOD PRESSURE: 153 MMHG

## 2023-05-01 DIAGNOSIS — M19.019 ARTHRITIS OF SHOULDER: ICD-10-CM

## 2023-05-01 DIAGNOSIS — M25.512 ACUTE PAIN OF LEFT SHOULDER: Primary | ICD-10-CM

## 2023-05-01 PROCEDURE — 99214 OFFICE O/P EST MOD 30 MIN: CPT | Performed by: PHYSICIAN ASSISTANT

## 2023-05-01 PROCEDURE — 73030 X-RAY EXAM OF SHOULDER: CPT | Mod: TC | Performed by: RADIOLOGY

## 2023-05-01 RX ORDER — FUROSEMIDE 40 MG
1 TABLET ORAL
COMMUNITY
Start: 2023-04-14 | End: 2024-09-09

## 2023-05-01 RX ORDER — METHYLPREDNISOLONE 4 MG
TABLET, DOSE PACK ORAL
Qty: 21 TABLET | Refills: 0 | Status: SHIPPED | OUTPATIENT
Start: 2023-05-01 | End: 2024-09-09

## 2023-05-01 NOTE — PROGRESS NOTES
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      Follow up with primary clinic for ongoing abdominal issues as well.        SUBJECTIVE:   Benjamin Elizabeth is a pleasant 87-year with a history of hypertension, hyperlipidemia, and ascending aortic root dilation, who underwent pacemaker implantation (9/22/2022) due to second-degree AV block    He complains of left shoulder pain onset a week ago.  No trauma.  No pain at rest. Pain with certain movements of upper arm/shoulder.  Pain does not radiate.  It is at the front of the shoulder  joint and in the arm pit.  Worse with pressure on the area.      Last BM today but complains of left sided lower abdominal pain intermittent since October.      History reviewed. No pertinent past medical history.      Current Outpatient Medications   Medication Sig Dispense Refill     Multiple Vitamins-Iron (DAILY-SHAWN/IRON/BETA-CAROTENE) TABS TAKE 1 TABLET BY MOUTH DAILY. (Patient not taking: Reported on 10/19/2020) 30 tablet 7     Social History     Tobacco Use     Smoking status: Never Smoker     Smokeless tobacco: Never Used   Substance Use Topics     Alcohol use: Not on file     Family History   Problem Relation Age of Onset     Diabetes Mother      Diabetes Father          ROS:    10 point ROS of systems including  Constitutional, Eyes, Respiratory, Cardiovascular, Gastroenterology, Genitourinary, Integumentary, Muscularskeletal, Psychiatric ,neurological were all negative except for pertinent positives noted in my HPI       OBJECTIVE:  BP (!) 153/73   Pulse 62   Temp 97.7  F (36.5  C)   Wt 91 kg (200 lb 9.6 oz)   SpO2 99%   BMI 26.47 kg/m    Physical Exam:  GENERAL APPEARANCE: healthy, alert and no distress  EYES: EOMI,  PERRL, conjunctiva clear  HENT: ear canals and TM's normal.  Nose and mouth without ulcers, erythema or lesions  NECK: supple, nontender, no lymphadenopathy  RESP: lungs clear to auscultation - no rales, rhonchi or wheezes  CV: regular rates and rhythm, normal S1 S2, no murmur noted  ABDOMEN:  soft, nontender, no HSM or masses and bowel sounds normal  Extremities: tenderness at AC joint on left.  ROM is decreased on left secondary to pain.    SKIN: no suspicious lesions or rashes    X-Ray was done, my findings are: degenerative changes.

## 2023-05-01 NOTE — PATIENT INSTRUCTIONS
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      Follow up with primary clinic for ongoing abdominal issues as well.

## 2023-05-24 ENCOUNTER — ANCILLARY PROCEDURE (OUTPATIENT)
Dept: CARDIOLOGY | Facility: CLINIC | Age: 88
End: 2023-05-24
Attending: INTERNAL MEDICINE
Payer: COMMERCIAL

## 2023-05-24 DIAGNOSIS — Z95.0 CARDIAC PACEMAKER IN SITU: ICD-10-CM

## 2023-05-24 PROCEDURE — 93296 REM INTERROG EVL PM/IDS: CPT | Performed by: INTERNAL MEDICINE

## 2023-05-24 PROCEDURE — 93294 REM INTERROG EVL PM/LDLS PM: CPT | Performed by: INTERNAL MEDICINE

## 2023-06-02 LAB
MDC_IDC_EPISODE_DTM: NORMAL
MDC_IDC_EPISODE_DURATION: 1 S
MDC_IDC_EPISODE_DURATION: 88 S
MDC_IDC_EPISODE_ID: NORMAL
MDC_IDC_EPISODE_TYPE: NORMAL
MDC_IDC_LEAD_IMPLANT_DT: NORMAL
MDC_IDC_LEAD_IMPLANT_DT: NORMAL
MDC_IDC_LEAD_LOCATION: NORMAL
MDC_IDC_LEAD_LOCATION: NORMAL
MDC_IDC_LEAD_LOCATION_DETAIL_1: NORMAL
MDC_IDC_LEAD_LOCATION_DETAIL_1: NORMAL
MDC_IDC_LEAD_MFG: NORMAL
MDC_IDC_LEAD_MFG: NORMAL
MDC_IDC_LEAD_MODEL: NORMAL
MDC_IDC_LEAD_MODEL: NORMAL
MDC_IDC_LEAD_POLARITY_TYPE: NORMAL
MDC_IDC_LEAD_POLARITY_TYPE: NORMAL
MDC_IDC_LEAD_SERIAL: NORMAL
MDC_IDC_LEAD_SERIAL: NORMAL
MDC_IDC_MSMT_BATTERY_DTM: NORMAL
MDC_IDC_MSMT_BATTERY_REMAINING_LONGEVITY: 138 MO
MDC_IDC_MSMT_BATTERY_REMAINING_PERCENTAGE: 100 %
MDC_IDC_MSMT_BATTERY_STATUS: NORMAL
MDC_IDC_MSMT_LEADCHNL_RA_IMPEDANCE_VALUE: 586 OHM
MDC_IDC_MSMT_LEADCHNL_RA_PACING_THRESHOLD_AMPLITUDE: 0.5 V
MDC_IDC_MSMT_LEADCHNL_RA_PACING_THRESHOLD_PULSEWIDTH: 0.4 MS
MDC_IDC_MSMT_LEADCHNL_RV_IMPEDANCE_VALUE: 858 OHM
MDC_IDC_MSMT_LEADCHNL_RV_PACING_THRESHOLD_AMPLITUDE: 0.6 V
MDC_IDC_MSMT_LEADCHNL_RV_PACING_THRESHOLD_PULSEWIDTH: 0.4 MS
MDC_IDC_PG_IMPLANT_DTM: NORMAL
MDC_IDC_PG_MFG: NORMAL
MDC_IDC_PG_MODEL: NORMAL
MDC_IDC_PG_SERIAL: NORMAL
MDC_IDC_PG_TYPE: NORMAL
MDC_IDC_SESS_CLINIC_NAME: NORMAL
MDC_IDC_SESS_DTM: NORMAL
MDC_IDC_SESS_TYPE: NORMAL
MDC_IDC_SET_BRADY_AT_MODE_SWITCH_MODE: NORMAL
MDC_IDC_SET_BRADY_AT_MODE_SWITCH_RATE: 170 {BEATS}/MIN
MDC_IDC_SET_BRADY_LOWRATE: 60 {BEATS}/MIN
MDC_IDC_SET_BRADY_MAX_SENSOR_RATE: 130 {BEATS}/MIN
MDC_IDC_SET_BRADY_MAX_TRACKING_RATE: 130 {BEATS}/MIN
MDC_IDC_SET_BRADY_MODE: NORMAL
MDC_IDC_SET_BRADY_PAV_DELAY_HIGH: 150 MS
MDC_IDC_SET_BRADY_PAV_DELAY_LOW: 200 MS
MDC_IDC_SET_BRADY_SAV_DELAY_HIGH: 150 MS
MDC_IDC_SET_BRADY_SAV_DELAY_LOW: 200 MS
MDC_IDC_SET_LEADCHNL_RA_PACING_AMPLITUDE: 3.5 V
MDC_IDC_SET_LEADCHNL_RA_PACING_CAPTURE_MODE: NORMAL
MDC_IDC_SET_LEADCHNL_RA_PACING_POLARITY: NORMAL
MDC_IDC_SET_LEADCHNL_RA_PACING_PULSEWIDTH: 0.4 MS
MDC_IDC_SET_LEADCHNL_RA_SENSING_ADAPTATION_MODE: NORMAL
MDC_IDC_SET_LEADCHNL_RA_SENSING_POLARITY: NORMAL
MDC_IDC_SET_LEADCHNL_RA_SENSING_SENSITIVITY: 0.25 MV
MDC_IDC_SET_LEADCHNL_RV_PACING_AMPLITUDE: 1.1 V
MDC_IDC_SET_LEADCHNL_RV_PACING_CAPTURE_MODE: NORMAL
MDC_IDC_SET_LEADCHNL_RV_PACING_POLARITY: NORMAL
MDC_IDC_SET_LEADCHNL_RV_PACING_PULSEWIDTH: 0.4 MS
MDC_IDC_SET_LEADCHNL_RV_SENSING_ADAPTATION_MODE: NORMAL
MDC_IDC_SET_LEADCHNL_RV_SENSING_POLARITY: NORMAL
MDC_IDC_SET_LEADCHNL_RV_SENSING_SENSITIVITY: 1.5 MV
MDC_IDC_SET_ZONE_DETECTION_INTERVAL: 375 MS
MDC_IDC_SET_ZONE_TYPE: NORMAL
MDC_IDC_SET_ZONE_VENDOR_TYPE: NORMAL
MDC_IDC_STAT_AT_BURDEN_PERCENT: 1 %
MDC_IDC_STAT_AT_DTM_END: NORMAL
MDC_IDC_STAT_AT_DTM_START: NORMAL
MDC_IDC_STAT_BRADY_DTM_END: NORMAL
MDC_IDC_STAT_BRADY_DTM_START: NORMAL
MDC_IDC_STAT_BRADY_RA_PERCENT_PACED: 64 %
MDC_IDC_STAT_BRADY_RV_PERCENT_PACED: 99 %
MDC_IDC_STAT_EPISODE_RECENT_COUNT: 0
MDC_IDC_STAT_EPISODE_RECENT_COUNT: 2
MDC_IDC_STAT_EPISODE_RECENT_COUNT_DTM_END: NORMAL
MDC_IDC_STAT_EPISODE_RECENT_COUNT_DTM_START: NORMAL
MDC_IDC_STAT_EPISODE_TYPE: NORMAL
MDC_IDC_STAT_EPISODE_VENDOR_TYPE: NORMAL

## 2023-07-10 NOTE — Clinical Note
Tested the atrial lead. See vendor printout/MD dictation.   Ketoconazole Counseling:   Patient counseled regarding improving absorption with orange juice.  Adverse effects include but are not limited to breast enlargement, headache, diarrhea, nausea, upset stomach, liver function test abnormalities, taste disturbance, and stomach pain.  There is a rare possibility of liver failure that can occur when taking ketoconazole. The patient understands that monitoring of LFTs may be required, especially at baseline. The patient verbalized understanding of the proper use and possible adverse effects of ketoconazole.  All of the patient's questions and concerns were addressed.

## 2023-09-05 DIAGNOSIS — Z95.0 CARDIAC PACEMAKER IN SITU: Primary | ICD-10-CM

## 2023-09-06 DIAGNOSIS — I50.9 CHF (CONGESTIVE HEART FAILURE) (H): Primary | ICD-10-CM

## 2023-09-11 ENCOUNTER — LAB (OUTPATIENT)
Dept: LAB | Facility: CLINIC | Age: 88
End: 2023-09-11
Payer: COMMERCIAL

## 2023-09-11 DIAGNOSIS — I50.9 CHF (CONGESTIVE HEART FAILURE) (H): ICD-10-CM

## 2023-09-11 PROCEDURE — 83880 ASSAY OF NATRIURETIC PEPTIDE: CPT

## 2023-09-11 PROCEDURE — 80048 BASIC METABOLIC PNL TOTAL CA: CPT

## 2023-09-11 PROCEDURE — 36415 COLL VENOUS BLD VENIPUNCTURE: CPT

## 2023-09-12 ENCOUNTER — TELEPHONE (OUTPATIENT)
Dept: CARDIOLOGY | Facility: CLINIC | Age: 88
End: 2023-09-12
Payer: COMMERCIAL

## 2023-09-12 LAB
ANION GAP SERPL CALCULATED.3IONS-SCNC: 9 MMOL/L (ref 7–15)
BUN SERPL-MCNC: 24.7 MG/DL (ref 8–23)
CALCIUM SERPL-MCNC: 8.7 MG/DL (ref 8.8–10.2)
CHLORIDE SERPL-SCNC: 109 MMOL/L (ref 98–107)
CREAT SERPL-MCNC: 1.04 MG/DL (ref 0.67–1.17)
DEPRECATED HCO3 PLAS-SCNC: 23 MMOL/L (ref 22–29)
EGFRCR SERPLBLD CKD-EPI 2021: 70 ML/MIN/1.73M2
GLUCOSE SERPL-MCNC: 103 MG/DL (ref 70–99)
NT-PROBNP SERPL-MCNC: 847 PG/ML (ref 0–1800)
POTASSIUM SERPL-SCNC: 4.7 MMOL/L (ref 3.4–5.3)
SODIUM SERPL-SCNC: 141 MMOL/L (ref 136–145)

## 2023-09-12 NOTE — TELEPHONE ENCOUNTER
Spoke with patient. He is fine with rescheduling... I forwarded him to our scheduling line for assistance.  VANESA ORTIZ

## 2023-09-12 NOTE — TELEPHONE ENCOUNTER
----- Message from Monica Nguyen RN sent at 9/12/2023  4:11 PM CDT -----  Looks like he was supposed to have device check too.   ----- Message -----  From: Geni Steve PA-C  Sent: 9/12/2023   2:49 PM CDT  To: Demetrice ilda Heart Ep Nurse    Routine labs which were supposed to be done for annual follow up visit with me look fine. However he missed his visit with me and I don't see that it's been rescheduled. Can you please reach out to him and encourage him to do so? Thanks!

## 2023-11-02 ENCOUNTER — ANCILLARY PROCEDURE (OUTPATIENT)
Dept: CARDIOLOGY | Facility: CLINIC | Age: 88
End: 2023-11-02
Attending: INTERNAL MEDICINE
Payer: COMMERCIAL

## 2023-11-02 VITALS
OXYGEN SATURATION: 100 % | SYSTOLIC BLOOD PRESSURE: 126 MMHG | HEART RATE: 62 BPM | BODY MASS INDEX: 26.27 KG/M2 | WEIGHT: 198.2 LBS | HEIGHT: 73 IN | DIASTOLIC BLOOD PRESSURE: 58 MMHG

## 2023-11-02 DIAGNOSIS — Z95.0 CARDIAC PACEMAKER IN SITU: ICD-10-CM

## 2023-11-02 DIAGNOSIS — I44.1 SECOND DEGREE ATRIOVENTRICULAR BLOCK: ICD-10-CM

## 2023-11-02 DIAGNOSIS — I77.810 ASCENDING AORTA DILATATION (H): Primary | ICD-10-CM

## 2023-11-02 DIAGNOSIS — I51.9 ASYMPTOMATIC LV DYSFUNCTION: ICD-10-CM

## 2023-11-02 PROCEDURE — 93280 PM DEVICE PROGR EVAL DUAL: CPT | Performed by: INTERNAL MEDICINE

## 2023-11-02 PROCEDURE — 99214 OFFICE O/P EST MOD 30 MIN: CPT | Performed by: NURSE PRACTITIONER

## 2023-11-02 NOTE — LETTER
11/2/2023    [facility]  [address] [place] [address], [address]  [facility] [address]    RE: Benjamin Elizabeth       Dear Colleague,     I had the pleasure of seeing Benjamin Elizabeth in the [facility].    Electrophysiology Clinic Progress Note  Benjamin Elizabeth MRN# [id number]   YOB: 1935 Age: 88 year old     Primary cardiologist: Previously Dr. Cain    Reason for visit: Annual follow up    History of presenting illness:    Benjamin Elizabeth is a pleasant 88 year old patient with past medical history significant for:    Second degree AVB: s/p PPM 9/2022 ("[employer]  Hypertension  Hyperlipidemia  Ascending aortic dilation  LV dysfunction    Today the patient presents with his wife for an annual follow-up.  He had a device check prior to today's visit as noted below.  Blood pressure is well controlled and he denies any symptoms of chest discomfort or shortness of breath on exertion.  He has chronic but stable lower extremity edema without symptoms of PND, orthopnea or palpitations.    Diagnotic studies:  Device check (11/2/2023): AP 69%,  99% no ventricular or atrial arrhythmias noted.  Estimated battery life 11 years  Device check (5/2023): AP 64%,  99%, 2 mode switch episodes comprising <1% of the time.  EGMs for review show As>Vs longest episode lasting 1 minute and 28 seconds, ventricular rates controlled   Limited echocardiogram (9/30/2022): LVEF 30 to 35% with severe hypokinesis in the mid to apical segments of the LV  Echocardiogram (9/20/2022): LVEF of 55 to 60% without wall motion abnormalities.  Mild biatrial enlargement.  Mild aortic root dilatation with ascending aorta moderately dilated.  Aortic root 4.2 cm and ascending aorta 4.5 cm             Assessment and Plan:     ASSESSMENT:    Second-degree degree AV block  Status post dual-chamber permanent pacemaker in 9/2022 ("[employer]  Most recent device check as above    Hypertension  Well-controlled  Currently on losartan  "37.5 mg daily and metoprolol XL 12.5 mg daily    LV dysfunction  Noted on echo from 9/30/2022 of LVEF of 30 to 35% that had dropped from 9/20/2022 of LV EF of 55 to 60%  No signs and symptoms of heart failure, has chronic and stable lower extremity edema    Ascending aorta and aortic root dilatation  Ascending aorta 4.5 cm and aortic root 4.2 cm from echo 9/2022       Update echocardiogram  Routine device checks  Follow-up in clinic in 1 year or sooner if needed.       Orders this Visit:  Orders Placed This Encounter   Procedures    Follow-Up with Cardiology FREDRICK    Echocardiogram Complete     No orders of the defined types were placed in this encounter.    There are no discontinued medications.    Today's clinic visit entailed:  Review of the result(s) of each unique test - Echo, device check Labs  Assessment requiring an independent historian(s) - significant other - Wife  Ordering of each unique test  Prescription drug management  30 minutes spent by me on the date of the encounter doing chart review, history and exam, documentation and further activities per the note  Provider  Link to [facility] Help Grid     The level of medical decision making during this visit was of moderate complexity.           Review of Systems:     Review of Systems:  Skin:  Negative     Eyes:  Positive for glasses  ENT:  Negative    Respiratory:  Negative    Cardiovascular:    edema;Positive for  Gastroenterology: Negative    Genitourinary:  Negative    Musculoskeletal:  Negative    Neurologic:  Negative    Psychiatric:  Negative    Heme/Lymph/Imm:  Negative    Endocrine:  Negative              Physical Exam:     Vitals: /58   Pulse 62   Ht 1.854 m (6' 1\")   Wt 89.9 kg (198 lb 3.2 oz)   SpO2 100%   BMI 26.15 kg/m    Constitutional: Well nourished and in no apparent distress.  Eyes: Pupils equal, round. Sclerae anicteric.   HEENT: Normocephalic, atraumatic.   Neck: Supple.  Respiratory: Breathing non-labored. Lungs clear to " auscultation bilaterally. No crackles, wheezes, rhonchi, or rales.  Cardiovascular:  Regular rate and rhythm, normal S1 and S2. No murmur, rub, or gallop. PPM site CDI  Skin: Warm, dry. No rashes, cyanosis, or xanthelasma.  Extremities: 1+ LE Edema  Neurologic: No gross motor deficits. Alert, awake, and oriented to person, place and time.  Psychiatric: Affect appropriate.        CURRENT MEDICATIONS:  Current Outpatient Medications   Medication Sig Dispense Refill    aspirin 81 MG EC tablet Take 81 mg by mouth daily (with dinner)      atorvastatin (LIPITOR) 40 MG tablet Take 40 mg by mouth daily (with dinner)      losartan (COZAAR) 25 MG tablet Take 37.5 mg by mouth daily (with dinner)      metoprolol succinate ER (TOPROL XL) 25 MG 24 hr tablet Take 0.5 tablets (12.5 mg) by mouth daily 30 tablet 0    multivitamin, therapeutic (THERA-VIT) TABS tablet Take 1 tablet by mouth daily (with dinner)      furosemide (LASIX) 40 MG tablet Take 1 tablet by mouth daily at 2 pm (Patient not taking: Reported on 11/2/2023)      methylPREDNISolone (MEDROL DOSEPAK) 4 MG tablet therapy pack Follow Package Directions (Patient not taking: Reported on 11/2/2023) 21 tablet 0       ALLERGIES  No Known Allergies      PAST MEDICAL HISTORY:  No past medical history on file.    PAST SURGICAL HISTORY:  Past Surgical History:   Procedure Laterality Date    EP PACEMAKER DEVICE & LEAD IMPLANT- RIGHT ATRIAL & LEFT VENTRICULAR N/A 9/22/2022    Procedure: Pacemaker Device & Lead Implant- Right Atrial & Left Ventricular;  Surgeon: Blaze Cain MD;  Location: [facility] CARDI[facility] CATH LAB       FAMILY HISTORY:  No family history on file.    SOCIAL HISTORY:  Social History     Socioeconomic History    Marital status:      Spouse name: None    Number of children: None    Years of education: None    Highest education level: None   Tobacco Use    Smoking status: Never    Smokeless tobacco: Never   Vaping Use    Vaping Use: Never used                Thank you for allowing me to participate in the care of your patient.      Sincerely,     TONYA Han [facility]  cc:   Venkata Kinsey MD  [address]  [place] [address]

## 2023-11-02 NOTE — PATIENT INSTRUCTIONS
Today's Recommendations    Update echocardiogram  Continue all medications without changes.  Please follow up with cardiology in 1 year.    Please send [employer] message or call [phone] to the RN team with questions or concerns.     Scheduling and after hours number [phone]    TONYA Shipley, CNP

## 2023-11-02 NOTE — PROGRESS NOTES
Electrophysiology Clinic Progress Note  Benjamin Elizabeth MRN# [id number]   YOB: 1935 Age: 88 year old     Primary cardiologist: Melody Cain    Reason for visit: Annual follow up    History of presenting illness:    Benjamin Elizabeth is a pleasant 88 year old patient with past medical history significant for:    Second degree AVB: s/p PPM 9/2022 ([employer])  Hypertension  Hyperlipidemia  Ascending aortic dilation  LV dysfunction    Today the patient presents with his wife for an annual follow-up.  He had a device check prior to today's visit as noted below.  Blood pressure is well controlled and he denies any symptoms of chest discomfort or shortness of breath on exertion.  He has chronic but stable lower extremity edema without symptoms of PND, orthopnea or palpitations.    Diagnotic studies:  Device check (11/2/2023): AP 69%,  99% no ventricular or atrial arrhythmias noted.  Estimated battery life 11 years  Device check (5/2023): AP 64%,  99%, 2 mode switch episodes comprising <1% of the time.  EGMs for review show As>Vs longest episode lasting 1 minute and 28 seconds, ventricular rates controlled   Limited echocardiogram (9/30/2022): LVEF 30 to 35% with severe hypokinesis in the mid to apical segments of the LV  Echocardiogram (9/20/2022): LVEF of 55 to 60% without wall motion abnormalities.  Mild biatrial enlargement.  Mild aortic root dilatation with ascending aorta moderately dilated.  Aortic root 4.2 cm and ascending aorta 4.5 cm             Assessment and Plan:     ASSESSMENT:    Second-degree degree AV block  Status post dual-chamber permanent pacemaker in 9/2022 ([employer]  Most recent device check as above    Hypertension  Well-controlled  Currently on losartan 37.5 mg daily and metoprolol XL 12.5 mg daily    LV dysfunction  Noted on echo from 9/30/2022 of LVEF of 30 to 35% that had dropped from 9/20/2022 of LV EF of 55 to 60%  No signs and symptoms of heart failure, has chronic and  "stable lower extremity edema    Ascending aorta and aortic root dilatation  Ascending aorta 4.5 cm and aortic root 4.2 cm from echo 9/2022       Update echocardiogram  Routine device checks  Follow-up in clinic in 1 year or sooner if needed.       Orders this Visit:  Orders Placed This Encounter   Procedures    Follow-Up with Cardiology FREDRICK    Echocardiogram Complete     No orders of the defined types were placed in this encounter.    There are no discontinued medications.    Today's clinic visit entailed:  Review of the result(s) of each unique test - Echo, device check Labs  Assessment requiring an independent historian(s) - significant other - Wife  Ordering of each unique test  Prescription drug management  30 minutes spent by me on the date of the encounter doing chart review, history and exam, documentation and further activities per the note  Provider  Link to [facility] Help Grid     The level of medical decision making during this visit was of moderate complexity.           Review of Systems:     Review of Systems:  Skin:  Negative     Eyes:  Positive for glasses  ENT:  Negative    Respiratory:  Negative    Cardiovascular:    edema;Positive for  Gastroenterology: Negative    Genitourinary:  Negative    Musculoskeletal:  Negative    Neurologic:  Negative    Psychiatric:  Negative    Heme/Lymph/Imm:  Negative    Endocrine:  Negative              Physical Exam:     Vitals: /58   Pulse 62   Ht 1.854 m (6' 1\")   Wt 89.9 kg (198 lb 3.2 oz)   SpO2 100%   BMI 26.15 kg/m    Constitutional: Well nourished and in no apparent distress.  Eyes: Pupils equal, round. Sclerae anicteric.   HEENT: Normocephalic, atraumatic.   Neck: Supple.  Respiratory: Breathing non-labored. Lungs clear to auscultation bilaterally. No crackles, wheezes, rhonchi, or rales.  Cardiovascular:  Regular rate and rhythm, normal S1 and S2. No murmur, rub, or gallop. PPM site CDI  Skin: Warm, dry. No rashes, cyanosis, or " xanthelasma.  Extremities: 1+ LE Edema  Neurologic: No gross motor deficits. Alert, awake, and oriented to person, place and time.  Psychiatric: Affect appropriate.        CURRENT MEDICATIONS:  Current Outpatient Medications   Medication Sig Dispense Refill    aspirin 81 MG EC tablet Take 81 mg by mouth daily (with dinner)      atorvastatin (LIPITOR) 40 MG tablet Take 40 mg by mouth daily (with dinner)      losartan (COZAAR) 25 MG tablet Take 37.5 mg by mouth daily (with dinner)      metoprolol succinate ER (TOPROL XL) 25 MG 24 hr tablet Take 0.5 tablets (12.5 mg) by mouth daily 30 tablet 0    multivitamin, therapeutic (THERA-VIT) TABS tablet Take 1 tablet by mouth daily (with dinner)      furosemide (LASIX) 40 MG tablet Take 1 tablet by mouth daily at 2 pm (Patient not taking: Reported on 11/2/2023)      methylPREDNISolone (MEDROL DOSEPAK) 4 MG tablet therapy pack Follow Package Directions (Patient not taking: Reported on 11/2/2023) 21 tablet 0       ALLERGIES  No Known Allergies      PAST MEDICAL HISTORY:  No past medical history on file.    PAST SURGICAL HISTORY:  Past Surgical History:   Procedure Laterality Date    EP PACEMAKER DEVICE & LEAD IMPLANT- RIGHT ATRIAL & LEFT VENTRICULAR N/A 9/22/2022    Procedure: Pacemaker Device & Lead Implant- Right Atrial & Left Ventricular;  Surgeon: Blaze Cain MD;  Location:  [facility]       FAMILY HISTORY:  No family history on file.    SOCIAL HISTORY:  Social History     Socioeconomic History    Marital status:      Spouse name: None    Number of children: None    Years of education: None    Highest education level: None   Tobacco Use    Smoking status: Never    Smokeless tobacco: Never   Vaping Use    Vaping Use: Never used

## 2023-11-03 LAB
MDC_IDC_LEAD_CONNECTION_STATUS: NORMAL
MDC_IDC_LEAD_CONNECTION_STATUS: NORMAL
MDC_IDC_LEAD_IMPLANT_DT: NORMAL
MDC_IDC_LEAD_IMPLANT_DT: NORMAL
MDC_IDC_LEAD_LOCATION: NORMAL
MDC_IDC_LEAD_LOCATION: NORMAL
MDC_IDC_LEAD_LOCATION_DETAIL_1: NORMAL
MDC_IDC_LEAD_LOCATION_DETAIL_1: NORMAL
MDC_IDC_LEAD_MFG: NORMAL
MDC_IDC_LEAD_MFG: NORMAL
MDC_IDC_LEAD_MODEL: NORMAL
MDC_IDC_LEAD_MODEL: NORMAL
MDC_IDC_LEAD_POLARITY_TYPE: NORMAL
MDC_IDC_LEAD_POLARITY_TYPE: NORMAL
MDC_IDC_LEAD_SERIAL: NORMAL
MDC_IDC_LEAD_SERIAL: NORMAL
MDC_IDC_MSMT_BATTERY_DTM: NORMAL
MDC_IDC_MSMT_BATTERY_REMAINING_LONGEVITY: 150 MO
MDC_IDC_MSMT_BATTERY_REMAINING_PERCENTAGE: 100 %
MDC_IDC_MSMT_BATTERY_STATUS: NORMAL
MDC_IDC_MSMT_LEADCHNL_RA_IMPEDANCE_VALUE: 632 OHM
MDC_IDC_MSMT_LEADCHNL_RA_PACING_THRESHOLD_AMPLITUDE: 0.7 V
MDC_IDC_MSMT_LEADCHNL_RA_PACING_THRESHOLD_PULSEWIDTH: 0.4 MS
MDC_IDC_MSMT_LEADCHNL_RV_IMPEDANCE_VALUE: 743 OHM
MDC_IDC_MSMT_LEADCHNL_RV_LEAD_CHANNEL_STATUS: NORMAL
MDC_IDC_MSMT_LEADCHNL_RV_PACING_THRESHOLD_AMPLITUDE: 0.5 V
MDC_IDC_MSMT_LEADCHNL_RV_PACING_THRESHOLD_PULSEWIDTH: 0.4 MS
MDC_IDC_PG_IMPLANT_DTM: NORMAL
MDC_IDC_PG_MFG: NORMAL
MDC_IDC_PG_MODEL: NORMAL
MDC_IDC_PG_SERIAL: NORMAL
MDC_IDC_PG_TYPE: NORMAL
MDC_IDC_SESS_CLINIC_NAME: NORMAL
MDC_IDC_SESS_DTM: NORMAL
MDC_IDC_SESS_TYPE: NORMAL
MDC_IDC_SET_BRADY_AT_MODE_SWITCH_MODE: NORMAL
MDC_IDC_SET_BRADY_AT_MODE_SWITCH_RATE: 170 {BEATS}/MIN
MDC_IDC_SET_BRADY_LOWRATE: 60 {BEATS}/MIN
MDC_IDC_SET_BRADY_MAX_SENSOR_RATE: 130 {BEATS}/MIN
MDC_IDC_SET_BRADY_MAX_TRACKING_RATE: 130 {BEATS}/MIN
MDC_IDC_SET_BRADY_MODE: NORMAL
MDC_IDC_SET_BRADY_PAV_DELAY_HIGH: 150 MS
MDC_IDC_SET_BRADY_PAV_DELAY_LOW: 200 MS
MDC_IDC_SET_BRADY_SAV_DELAY_HIGH: 150 MS
MDC_IDC_SET_BRADY_SAV_DELAY_LOW: 200 MS
MDC_IDC_SET_LEADCHNL_RA_PACING_AMPLITUDE: 2 V
MDC_IDC_SET_LEADCHNL_RA_PACING_CAPTURE_MODE: NORMAL
MDC_IDC_SET_LEADCHNL_RA_PACING_POLARITY: NORMAL
MDC_IDC_SET_LEADCHNL_RA_PACING_PULSEWIDTH: 0.4 MS
MDC_IDC_SET_LEADCHNL_RA_SENSING_ADAPTATION_MODE: NORMAL
MDC_IDC_SET_LEADCHNL_RA_SENSING_POLARITY: NORMAL
MDC_IDC_SET_LEADCHNL_RA_SENSING_SENSITIVITY: 0.25 MV
MDC_IDC_SET_LEADCHNL_RV_PACING_AMPLITUDE: 1.1 V
MDC_IDC_SET_LEADCHNL_RV_PACING_CAPTURE_MODE: NORMAL
MDC_IDC_SET_LEADCHNL_RV_PACING_POLARITY: NORMAL
MDC_IDC_SET_LEADCHNL_RV_PACING_PULSEWIDTH: 0.4 MS
MDC_IDC_SET_LEADCHNL_RV_SENSING_ADAPTATION_MODE: NORMAL
MDC_IDC_SET_LEADCHNL_RV_SENSING_POLARITY: NORMAL
MDC_IDC_SET_LEADCHNL_RV_SENSING_SENSITIVITY: 1.5 MV
MDC_IDC_SET_ZONE_DETECTION_INTERVAL: 375 MS
MDC_IDC_SET_ZONE_STATUS: NORMAL
MDC_IDC_SET_ZONE_TYPE: NORMAL
MDC_IDC_SET_ZONE_VENDOR_TYPE: NORMAL
MDC_IDC_STAT_AT_BURDEN_PERCENT: 1 %
MDC_IDC_STAT_AT_DTM_END: NORMAL
MDC_IDC_STAT_AT_DTM_START: NORMAL
MDC_IDC_STAT_BRADY_DTM_END: NORMAL
MDC_IDC_STAT_BRADY_DTM_START: NORMAL
MDC_IDC_STAT_BRADY_RA_PERCENT_PACED: 69 %
MDC_IDC_STAT_BRADY_RV_PERCENT_PACED: 99 %
MDC_IDC_STAT_EPISODE_RECENT_COUNT: 0
MDC_IDC_STAT_EPISODE_RECENT_COUNT_DTM_END: NORMAL
MDC_IDC_STAT_EPISODE_RECENT_COUNT_DTM_START: NORMAL
MDC_IDC_STAT_EPISODE_TYPE: NORMAL
MDC_IDC_STAT_EPISODE_VENDOR_TYPE: NORMAL
MDC_IDC_STAT_EPISODE_VENDOR_TYPE: NORMAL

## 2023-11-27 ENCOUNTER — HOSPITAL ENCOUNTER (OUTPATIENT)
Dept: CARDIOLOGY | Facility: CLINIC | Age: 88
Discharge: HOME | End: 2023-11-27
Attending: PHYSICIAN ASSISTANT | Admitting: PHYSICIAN ASSISTANT
Payer: COMMERCIAL

## 2023-11-27 DIAGNOSIS — I51.9 ASYMPTOMATIC LV DYSFUNCTION: ICD-10-CM

## 2023-11-27 DIAGNOSIS — I77.810 ASCENDING AORTA DILATATION (H): ICD-10-CM

## 2023-11-27 LAB — LVEF ECHO: NORMAL

## 2023-11-27 PROCEDURE — 255N000002 HC RX 255 OP 636: Performed by: NURSE PRACTITIONER

## 2023-11-27 PROCEDURE — 999N000208 ECHOCARDIOGRAM COMPLETE

## 2023-11-27 PROCEDURE — 93306 TTE W/DOPPLER COMPLETE: CPT | Mod: 26 | Performed by: INTERNAL MEDICINE

## 2023-11-27 RX ADMIN — HUMAN ALBUMIN MICROSPHERES AND PERFLUTREN 4 ML: 10; .22 INJECTION, SOLUTION INTRAVENOUS at 11:29

## 2023-12-01 ENCOUNTER — OFFICE VISIT (OUTPATIENT)
Dept: URGENT CARE | Facility: URGENT CARE | Age: 88
End: 2023-12-01
Payer: COMMERCIAL

## 2023-12-01 VITALS
SYSTOLIC BLOOD PRESSURE: 138 MMHG | DIASTOLIC BLOOD PRESSURE: 72 MMHG | HEART RATE: 59 BPM | OXYGEN SATURATION: 98 % | BODY MASS INDEX: 26.25 KG/M2 | TEMPERATURE: 98 F | WEIGHT: 199 LBS

## 2023-12-01 DIAGNOSIS — K59.01 SLOW TRANSIT CONSTIPATION: Primary | ICD-10-CM

## 2023-12-01 PROCEDURE — 99213 OFFICE O/P EST LOW 20 MIN: CPT | Performed by: FAMILY MEDICINE

## 2023-12-01 RX ORDER — POLYETHYLENE GLYCOL 3350 17 G/17G
17 POWDER, FOR SOLUTION ORAL 2 TIMES DAILY
Qty: 238 G | Refills: 0 | Status: SHIPPED | OUTPATIENT
Start: 2023-12-01 | End: 2023-12-08

## 2023-12-01 ASSESSMENT — ENCOUNTER SYMPTOMS
NAUSEA: 0
CONSTIPATION: 1
VOMITING: 0
HEMATOCHEZIA: 0
FEVER: 0

## 2023-12-01 NOTE — PROGRESS NOTES
Assessment & Plan     Slow transit constipation  Gentle discussion regarding primary care versus urgent care for chronic issues today his symptoms are most likely consistent with constipation he is to start MiraLAX twice daily.  History and exam not suggestive for bowel obstruction or colitis.  He is to follow-up with his PCP for ongoing management of slow transit constipation likely related to age.  - polyethylene glycol (MIRALAX) 17 GM/Dose powder  Dispense: 238 g; Refill: 0      Return in about 1 week (around 12/8/2023) for If symptoms do not improve or gets worse..    Trung Castaneda MD  [facility] NELLY Alexander is a 88 year old, presenting for the following health issues:  Urgent Care (X3 Months left lower abdominal,hip and back pain, worse in the am, constipation, gas, /No meds taken )      HPI   88-year-old male presents with 3-month history of lower left-sided abdominal pain hip and back pain, he endorses having very small bowel movements may be half the size of his pinky for the last 2 days, he is passing gas, no blood.  Denies any nausea or vomiting.    Past medical history significant for bilateral hip replacements some.    No acute injury.            Review of Systems   Constitutional:  Negative for fever.   Gastrointestinal:  Positive for constipation. Negative for hematochezia, nausea and vomiting.            Objective    /72   Pulse 59   Temp 98  F (36.7  C) (Oral)   Wt 90.3 kg (199 lb)   SpO2 98%   BMI 26.25 kg/m    Body mass index is 26.25 kg/m .  Physical Exam  HENT:      Head:      Comments: Hard of hearing  Cardiovascular:      Rate and Rhythm: Normal rate and regular rhythm.   Pulmonary:      Effort: Pulmonary effort is normal.      Breath sounds: Normal breath sounds.   Abdominal:      General: Abdomen is flat. There is no distension.      Palpations: Abdomen is soft.      Tenderness: There is no abdominal tenderness.   Musculoskeletal:          General: Normal range of motion.      Comments: Bilateral hip exam unremarkable

## 2024-03-07 ENCOUNTER — ANCILLARY PROCEDURE (OUTPATIENT)
Dept: CARDIOLOGY | Facility: CLINIC | Age: 89
End: 2024-03-07
Attending: INTERNAL MEDICINE
Payer: COMMERCIAL

## 2024-03-07 DIAGNOSIS — Z95.0 CARDIAC PACEMAKER IN SITU: ICD-10-CM

## 2024-03-07 PROCEDURE — 93296 REM INTERROG EVL PM/IDS: CPT | Performed by: INTERNAL MEDICINE

## 2024-03-07 PROCEDURE — 93294 REM INTERROG EVL PM/LDLS PM: CPT | Performed by: INTERNAL MEDICINE

## 2024-03-08 LAB
MDC_IDC_EPISODE_DTM: NORMAL
MDC_IDC_EPISODE_DTM: NORMAL
MDC_IDC_EPISODE_ID: NORMAL
MDC_IDC_EPISODE_ID: NORMAL
MDC_IDC_EPISODE_TYPE: NORMAL
MDC_IDC_EPISODE_TYPE: NORMAL
MDC_IDC_LEAD_CONNECTION_STATUS: NORMAL
MDC_IDC_LEAD_CONNECTION_STATUS: NORMAL
MDC_IDC_LEAD_IMPLANT_DT: NORMAL
MDC_IDC_LEAD_IMPLANT_DT: NORMAL
MDC_IDC_LEAD_LOCATION: NORMAL
MDC_IDC_LEAD_LOCATION: NORMAL
MDC_IDC_LEAD_LOCATION_DETAIL_1: NORMAL
MDC_IDC_LEAD_LOCATION_DETAIL_1: NORMAL
MDC_IDC_LEAD_MFG: NORMAL
MDC_IDC_LEAD_MFG: NORMAL
MDC_IDC_LEAD_MODEL: NORMAL
MDC_IDC_LEAD_MODEL: NORMAL
MDC_IDC_LEAD_POLARITY_TYPE: NORMAL
MDC_IDC_LEAD_POLARITY_TYPE: NORMAL
MDC_IDC_LEAD_SERIAL: NORMAL
MDC_IDC_LEAD_SERIAL: NORMAL
MDC_IDC_MSMT_BATTERY_DTM: NORMAL
MDC_IDC_MSMT_BATTERY_REMAINING_LONGEVITY: 138 MO
MDC_IDC_MSMT_BATTERY_REMAINING_PERCENTAGE: 100 %
MDC_IDC_MSMT_BATTERY_STATUS: NORMAL
MDC_IDC_MSMT_LEADCHNL_RA_IMPEDANCE_VALUE: 617 OHM
MDC_IDC_MSMT_LEADCHNL_RA_PACING_THRESHOLD_AMPLITUDE: 0.4 V
MDC_IDC_MSMT_LEADCHNL_RA_PACING_THRESHOLD_PULSEWIDTH: 0.4 MS
MDC_IDC_MSMT_LEADCHNL_RV_IMPEDANCE_VALUE: 659 OHM
MDC_IDC_MSMT_LEADCHNL_RV_PACING_THRESHOLD_AMPLITUDE: 0.7 V
MDC_IDC_MSMT_LEADCHNL_RV_PACING_THRESHOLD_PULSEWIDTH: 0.4 MS
MDC_IDC_PG_IMPLANT_DTM: NORMAL
MDC_IDC_PG_MFG: NORMAL
MDC_IDC_PG_MODEL: NORMAL
MDC_IDC_PG_SERIAL: NORMAL
MDC_IDC_PG_TYPE: NORMAL
MDC_IDC_SESS_CLINIC_NAME: NORMAL
MDC_IDC_SESS_DTM: NORMAL
MDC_IDC_SESS_TYPE: NORMAL
MDC_IDC_SET_BRADY_AT_MODE_SWITCH_MODE: NORMAL
MDC_IDC_SET_BRADY_AT_MODE_SWITCH_RATE: 170 {BEATS}/MIN
MDC_IDC_SET_BRADY_LOWRATE: 60 {BEATS}/MIN
MDC_IDC_SET_BRADY_MAX_SENSOR_RATE: 130 {BEATS}/MIN
MDC_IDC_SET_BRADY_MAX_TRACKING_RATE: 130 {BEATS}/MIN
MDC_IDC_SET_BRADY_MODE: NORMAL
MDC_IDC_SET_BRADY_PAV_DELAY_HIGH: 150 MS
MDC_IDC_SET_BRADY_PAV_DELAY_LOW: 200 MS
MDC_IDC_SET_BRADY_SAV_DELAY_HIGH: 150 MS
MDC_IDC_SET_BRADY_SAV_DELAY_LOW: 200 MS
MDC_IDC_SET_LEADCHNL_RA_PACING_AMPLITUDE: 2 V
MDC_IDC_SET_LEADCHNL_RA_PACING_CAPTURE_MODE: NORMAL
MDC_IDC_SET_LEADCHNL_RA_PACING_POLARITY: NORMAL
MDC_IDC_SET_LEADCHNL_RA_PACING_PULSEWIDTH: 0.4 MS
MDC_IDC_SET_LEADCHNL_RA_SENSING_ADAPTATION_MODE: NORMAL
MDC_IDC_SET_LEADCHNL_RA_SENSING_POLARITY: NORMAL
MDC_IDC_SET_LEADCHNL_RA_SENSING_SENSITIVITY: 0.25 MV
MDC_IDC_SET_LEADCHNL_RV_PACING_AMPLITUDE: 1.1 V
MDC_IDC_SET_LEADCHNL_RV_PACING_CAPTURE_MODE: NORMAL
MDC_IDC_SET_LEADCHNL_RV_PACING_POLARITY: NORMAL
MDC_IDC_SET_LEADCHNL_RV_PACING_PULSEWIDTH: 0.4 MS
MDC_IDC_SET_LEADCHNL_RV_SENSING_ADAPTATION_MODE: NORMAL
MDC_IDC_SET_LEADCHNL_RV_SENSING_POLARITY: NORMAL
MDC_IDC_SET_LEADCHNL_RV_SENSING_SENSITIVITY: 1.5 MV
MDC_IDC_SET_ZONE_DETECTION_INTERVAL: 375 MS
MDC_IDC_SET_ZONE_STATUS: NORMAL
MDC_IDC_SET_ZONE_TYPE: NORMAL
MDC_IDC_SET_ZONE_VENDOR_TYPE: NORMAL
MDC_IDC_STAT_AT_BURDEN_PERCENT: 0 %
MDC_IDC_STAT_AT_DTM_END: NORMAL
MDC_IDC_STAT_AT_DTM_START: NORMAL
MDC_IDC_STAT_BRADY_DTM_END: NORMAL
MDC_IDC_STAT_BRADY_DTM_START: NORMAL
MDC_IDC_STAT_BRADY_RA_PERCENT_PACED: 81 %
MDC_IDC_STAT_BRADY_RV_PERCENT_PACED: 98 %
MDC_IDC_STAT_EPISODE_RECENT_COUNT: 0
MDC_IDC_STAT_EPISODE_RECENT_COUNT_DTM_END: NORMAL
MDC_IDC_STAT_EPISODE_RECENT_COUNT_DTM_START: NORMAL
MDC_IDC_STAT_EPISODE_TYPE: NORMAL
MDC_IDC_STAT_EPISODE_VENDOR_TYPE: NORMAL
MDC_IDC_STAT_EPISODE_VENDOR_TYPE: NORMAL

## 2024-03-31 ENCOUNTER — HEALTH MAINTENANCE LETTER (OUTPATIENT)
Age: 89
End: 2024-03-31

## 2024-04-16 ENCOUNTER — TELEPHONE (OUTPATIENT)
Dept: CARDIOLOGY | Facility: CLINIC | Age: 89
End: 2024-04-16
Payer: COMMERCIAL

## 2024-04-16 DIAGNOSIS — I48.91 ATRIAL FIBRILLATION (H): Primary | ICD-10-CM

## 2024-04-16 NOTE — TELEPHONE ENCOUNTER
Remote PPM alert received for AF burden >1 hr. Episode occurred yesterday 4/15/2024 at 8:54pm, duration 3 hours. EGM shows aflutter with V rates controlled (V paced). 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    Sustained aflutter is a new finding. Pt has had shorter atrial arrhythmias in the past, longest 12 minutes. Pt does not take AC. He is on ASA.     ADDENDUM 10:15AM. Called pt. No answer, left non-urgent message to call back to device clinic for a message about his PPM. When pt calls back, will ask if he had any symptoms, and send to Maricarmen NP (previous pt of Dr. Cain) to see if pt needs to start AC.       EGM:           Historical atrial arrhythmias:      \

## 2024-04-16 NOTE — TELEPHONE ENCOUNTER
Miryam Wright  You1 hour ago (12:02 PM)     MM  Hi there,    Both Eliquis and Xarelto are covered medications $45 for 30 days supply.    Thank you for allowing me to help with your patient  Miryam Wright [facility]  Pharmacy Discharge Liaison [facility]/[facility] locations           Above message received from pharmacy liaison.   Called patient to review recommendation to start OAC and med cost. No answer and no voicemail set up for either home or mobile phone. Sent patient a [employer] message requesting he call back to the Device RN line.    VANESA RN

## 2024-04-16 NOTE — TELEPHONE ENCOUNTER
Patient does qualify for anticoagulation based on a [id number] score of 3 (age++ and hypertension).  Can you please look into cost of Xarelto and Eliquis? I would recommend Eliquis 5 mg twice daily or Xarelto 20 mg daily depending on cost to patient.    TONYA Shipley, CNP

## 2024-04-16 NOTE — TELEPHONE ENCOUNTER
Pt called back: We discussed recent finding of Afib. Pt states he was watching TV at the time and did not feel any thing. Denies SOB or lightheadedness.     Reviewed possible need for OAC but told him the doctor would have to make that decision and we would get back to him once a decision was made either way.  Will route to Maricarmen Weldon NP at this time for review.

## 2024-04-17 NOTE — TELEPHONE ENCOUNTER
Spoke with patient. He is aware of recommendations and will to start the xarelto but is hoping that he will be able to come off of this at some point. He was agreeable to a 30 day supply with 1 refill but wants his device to be rechecked after 30 days to assess for any recurrent AF episodes.    Spoke with patient about what AF is and the purpose of a blood thinner. Explained that he is at higher risk of bleeding while on this medication and certain things to avoid. He knows to talk with his pharmacist about this further when he picks up the medication.    When ordering the xarelto, a warning popped up in regards to patient's baby ASA:    Will route to Maricarmen Weldon to determine if she would like for patient to continue taking ASA or not.    Order placed for first available follow up with Maricarmen Weldon. Patient routed to scheduling to get this set up.

## 2024-05-08 ENCOUNTER — APPOINTMENT (OUTPATIENT)
Dept: CT IMAGING | Facility: CLINIC | Age: 89
End: 2024-05-08
Attending: EMERGENCY MEDICINE
Payer: COMMERCIAL

## 2024-05-08 ENCOUNTER — HOSPITAL ENCOUNTER (EMERGENCY)
Facility: CLINIC | Age: 89
Discharge: HOME | End: 2024-05-08
Attending: EMERGENCY MEDICINE | Admitting: EMERGENCY MEDICINE
Payer: COMMERCIAL

## 2024-05-08 ENCOUNTER — DOCUMENTATION ONLY (OUTPATIENT)
Dept: CARDIOLOGY | Facility: CLINIC | Age: 89
End: 2024-05-08

## 2024-05-08 VITALS
RESPIRATION RATE: 20 BRPM | HEIGHT: 73 IN | BODY MASS INDEX: 26.06 KG/M2 | SYSTOLIC BLOOD PRESSURE: 159 MMHG | DIASTOLIC BLOOD PRESSURE: 82 MMHG | HEART RATE: 54 BPM | OXYGEN SATURATION: 98 % | TEMPERATURE: 97.6 F | WEIGHT: 196.65 LBS

## 2024-05-08 DIAGNOSIS — R42 ORTHOSTATIC DIZZINESS: Primary | ICD-10-CM

## 2024-05-08 LAB
ALBUMIN SERPL BCG-MCNC: 3.7 G/DL (ref 3.5–5.2)
ALP SERPL-CCNC: 60 U/L (ref 40–150)
ALT SERPL W P-5'-P-CCNC: 14 U/L (ref 0–70)
ANION GAP SERPL CALCULATED.3IONS-SCNC: 6 MMOL/L (ref 7–15)
AST SERPL W P-5'-P-CCNC: 23 U/L (ref 0–45)
BASOPHILS # BLD AUTO: 0 10E3/UL (ref 0–0.2)
BASOPHILS NFR BLD AUTO: 0 %
BILIRUB SERPL-MCNC: 0.4 MG/DL
BUN SERPL-MCNC: 18.1 MG/DL (ref 8–23)
CALCIUM SERPL-MCNC: 8.9 MG/DL (ref 8.8–10.2)
CHLORIDE SERPL-SCNC: 108 MMOL/L (ref 98–107)
CREAT SERPL-MCNC: 0.97 MG/DL (ref 0.67–1.17)
DEPRECATED HCO3 PLAS-SCNC: 27 MMOL/L (ref 22–29)
EGFRCR SERPLBLD CKD-EPI 2021: 76 ML/MIN/1.73M2
EOSINOPHIL # BLD AUTO: 0.1 10E3/UL (ref 0–0.7)
EOSINOPHIL NFR BLD AUTO: 2 %
ERYTHROCYTE [DISTWIDTH] IN BLOOD BY AUTOMATED COUNT: 14.2 % (ref 10–15)
GLUCOSE SERPL-MCNC: 90 MG/DL (ref 70–99)
HCT VFR BLD AUTO: 37 % (ref 40–53)
HGB BLD-MCNC: 11.8 G/DL (ref 13.3–17.7)
IMM GRANULOCYTES # BLD: 0 10E3/UL
IMM GRANULOCYTES NFR BLD: 0 %
LYMPHOCYTES # BLD AUTO: 1.5 10E3/UL (ref 0.8–5.3)
LYMPHOCYTES NFR BLD AUTO: 32 %
MAGNESIUM SERPL-MCNC: 1.9 MG/DL (ref 1.7–2.3)
MCH RBC QN AUTO: 31.3 PG (ref 26.5–33)
MCHC RBC AUTO-ENTMCNC: 31.9 G/DL (ref 31.5–36.5)
MCV RBC AUTO: 98 FL (ref 78–100)
MONOCYTES # BLD AUTO: 0.5 10E3/UL (ref 0–1.3)
MONOCYTES NFR BLD AUTO: 11 %
NEUTROPHILS # BLD AUTO: 2.6 10E3/UL (ref 1.6–8.3)
NEUTROPHILS NFR BLD AUTO: 55 %
NRBC # BLD AUTO: 0 10E3/UL
NRBC BLD AUTO-RTO: 0 /100
PLATELET # BLD AUTO: 125 10E3/UL (ref 150–450)
POTASSIUM SERPL-SCNC: 4.5 MMOL/L (ref 3.4–5.3)
PROT SERPL-MCNC: 6.3 G/DL (ref 6.4–8.3)
RBC # BLD AUTO: 3.77 10E6/UL (ref 4.4–5.9)
SODIUM SERPL-SCNC: 141 MMOL/L (ref 135–145)
WBC # BLD AUTO: 4.7 10E3/UL (ref 4–11)

## 2024-05-08 PROCEDURE — 83735 ASSAY OF MAGNESIUM: CPT | Performed by: EMERGENCY MEDICINE

## 2024-05-08 PROCEDURE — 85025 COMPLETE CBC W/AUTO DIFF WBC: CPT | Performed by: EMERGENCY MEDICINE

## 2024-05-08 PROCEDURE — 258N000003 HC RX IP 258 OP 636: Performed by: EMERGENCY MEDICINE

## 2024-05-08 PROCEDURE — 99285 EMERGENCY DEPT VISIT HI MDM: CPT | Mod: 25

## 2024-05-08 PROCEDURE — 250N000009 HC RX 250: Performed by: EMERGENCY MEDICINE

## 2024-05-08 PROCEDURE — 82040 ASSAY OF SERUM ALBUMIN: CPT | Performed by: EMERGENCY MEDICINE

## 2024-05-08 PROCEDURE — 250N000011 HC RX IP 250 OP 636: Performed by: EMERGENCY MEDICINE

## 2024-05-08 PROCEDURE — 70450 CT HEAD/BRAIN W/O DYE: CPT | Mod: XU

## 2024-05-08 PROCEDURE — 93005 ELECTROCARDIOGRAM TRACING: CPT

## 2024-05-08 PROCEDURE — 70496 CT ANGIOGRAPHY HEAD: CPT

## 2024-05-08 PROCEDURE — 36415 COLL VENOUS BLD VENIPUNCTURE: CPT | Performed by: EMERGENCY MEDICINE

## 2024-05-08 RX ORDER — IOPAMIDOL 755 MG/ML
500 INJECTION, SOLUTION INTRAVASCULAR ONCE
Status: COMPLETED | OUTPATIENT
Start: 2024-05-08 | End: 2024-05-08

## 2024-05-08 RX ADMIN — SODIUM CHLORIDE 80 ML: 9 INJECTION, SOLUTION INTRAVENOUS at 16:13

## 2024-05-08 RX ADMIN — SODIUM CHLORIDE 1000 ML: 9 INJECTION, SOLUTION INTRAVENOUS at 17:18

## 2024-05-08 RX ADMIN — IOPAMIDOL 67 ML: 755 INJECTION, SOLUTION INTRAVENOUS at 16:13

## 2024-05-08 ASSESSMENT — ACTIVITIES OF DAILY LIVING (ADL)
ADLS_ACUITY_SCORE: 36
ADLS_ACUITY_SCORE: 38
ADLS_ACUITY_SCORE: 38
ADLS_ACUITY_SCORE: 36
ADLS_ACUITY_SCORE: 38

## 2024-05-08 ASSESSMENT — COLUMBIA-SUICIDE SEVERITY RATING SCALE - C-SSRS
1. IN THE PAST MONTH, HAVE YOU WISHED YOU WERE DEAD OR WISHED YOU COULD GO TO SLEEP AND NOT WAKE UP?: NO
2. HAVE YOU ACTUALLY HAD ANY THOUGHTS OF KILLING YOURSELF IN THE PAST MONTH?: NO
6. HAVE YOU EVER DONE ANYTHING, STARTED TO DO ANYTHING, OR PREPARED TO DO ANYTHING TO END YOUR LIFE?: NO

## 2024-05-08 NOTE — DISCHARGE INSTRUCTIONS
Please follow-up with your primary care provider and/or specialist regarding your visit to the ER today.    Please return to the emergency department should you experience any of the symptoms we specifically discussed, including but not limited to recurrence or worsening of your symptoms, or development of any new and concerning symptoms.    Your dizziness today may be secondary to your medications such as losartan and metoprolol.  Please follow-up with your primary care doctor for review of your medication list to see if anything can potentially contribute to your dizziness or some drops in blood pressure.    I have included dizzy clinic information for you with [facility] for you to contact and follow-up.    Please follow-up with your primary neurologist regarding her dizziness.    In the future, when you go from laying to sitting, sitting to standing, and standing to walking, please count 10 seconds before any body position changes and movement to allow for your blood pressures to normalize in your body to get used to the position change.

## 2024-05-08 NOTE — ED TRIAGE NOTES
Pt states that he has been having headaches for a couple of weeks, he comes in today after dizziness for the past 2 days.  He is also complaining of pain in his right hip.     Triage Assessment (Adult)       Row Name 05/08/24 1335          Triage Assessment    Airway WDL WDL        Respiratory WDL    Respiratory WDL WDL        Cardiac WDL    Cardiac WDL WDL                      100

## 2024-05-08 NOTE — ED PROVIDER NOTES
"  Emergency Department Note      History of Present Illness     Chief Complaint  Dizziness    HPI  Benjamin Elizabeth is a 89 year old male anticoagulated on Xarelto with history of hypertension, anemia, CAD, and CVA who presents to the ED with his wife for evaluation of dizziness. Patient reports sudden onset of dizziness yesterday morning when he was getting out of bed. The episodes of \"room spinning\" dizziness have been intermittent and last for about 30 seconds. The dizziness has since resolved in the ED and he reports the last episode was earlier this morning. Benjamin reports a headache last week but he did not have one yesterday with onset of dizziness yesterday. Denies unilateral numbness or weakness, neck pain, neck stiffness, neck manipulations, chest pain, shortness of breath, nausea, urinary symptoms, fever, rhinorrhea, cough, pharyngitis, or known sick contacts. He does not believe he has a history of stroke. Of note, patient was advised to stop certain blood pressure medications about 2 months ago which wife believes was due to decreases in his blood pressure. He also notes addition of Xarelto 3 weeks ago after he was reportedly taken off of statins. They believe this was due to atrial fibrillation. Patient's wife reports normal speech. He has never experienced this type of dizziness before.  These episodes appear to be exacerbated going from laying to standing the patient.    Independent Historian  Wife and patient as detailed above.    Review of External Notes  4/11/2024 office visit note -it would appear that patient was advised to discontinue atorvastatin for a week to see if this helps with constipation and flatulence and plan to start furosemide in the morning when he wakes up    Past Medical History   Medical History and Problem List  CVA   Second degree atrioventricular block   Anemia  CAD  BPH  Cervicalgia  Lumbar DDD  Dyslipidemia  Squamous cell carcinoma in situ   Nonrheumatic aortic valve " "insufficiency   Nonrheumatic mitral valve regurgitation   Ascending aorta dilation   Osteoarthritis  Senile nuclear cataract    Vitreous degeneration   Migraine with aura   Hypertension     Medications  Atorvastatin  Losartan  Metoprolol succinate ER  Xarelto     Surgical History   EP pacemaker device & lead implant   Bilateral cataract removal IOL   Physical Exam   Patient Vitals for the past 24 hrs:   BP Temp Temp src Pulse Resp SpO2 Height Weight   05/08/24 1805 (!) 159/82 -- -- 54 -- 98 % -- --   05/08/24 1719 -- 97.6  F (36.4  C) Temporal -- 20 -- -- --   05/08/24 1711 (!) 153/87 -- -- 62 -- -- -- --   05/08/24 1710 (!) 174/77 -- -- 60 -- -- -- --   05/08/24 1709 (!) 177/75 -- -- 56 -- -- -- --   05/08/24 1351 -- -- -- -- -- 98 % -- --   05/08/24 1350 (!) 160/73 -- -- 60 -- -- -- --   05/08/24 1337 -- -- -- -- -- -- 1.854 m (6' 1\") 89.2 kg (196 lb 10.4 oz)     Physical Exam  Constitutional:       General: Not in acute distress.     Appearance: Normal appearance  HENT:      Head: Normocephalic and atraumatic.   Eyes:     Extraocular Movements: Extraocular movements intact.      Conjunctiva/sclera: Conjunctivae normal.      Pupils: Pupils are equal, round, and reactive to light.   Cardiovascular:     Rate and Rhythm: Normal rate and regular rhythm.   Pulmonary:      Effort: Pulmonary effort is normal.      Breath sounds: Normal breath sounds.   Abdominal:      General: Abdomen is flat. There is no distension.      Palpations: Abdomen is soft.      Tenderness: There is no abdominal tenderness.   Musculoskeletal:      Cervical back: Normal range of motion and neck supple. No rigidity.      General: No swelling or deformity.   Skin:     General: Skin is warm and dry.   Neurological:      General: No focal deficit present.  No truncal ataxia.  Able to stand without difficulty.     NIHSS: Patient alert and keenly responsive. Able to answer month and age. Able and blink eyes and squeeze hands. No gaze palsy. No visual " field deficits. No facial palsy. No arm drift bilaterally. No leg drift bilaterally. No limb ataxia. Able to complete finger nose finger and heel to shin. No sensory deficits. No language deficits/aphasia. No dysarthria. No extinction/inattention.     NIHSS score of 0.       Mental Status: Alert and oriented to person, place, and time.   Psychiatric:         Mood and Affect: Mood normal.         Behavior: Behavior normal.     Diagnostics   Lab Results   Labs Ordered and Resulted from Time of ED Arrival to Time of ED Departure   COMPREHENSIVE METABOLIC PANEL - Abnormal       Result Value    Sodium 141      Potassium 4.5      Carbon Dioxide (CO2) 27      Anion Gap 6 (*)     Urea Nitrogen 18.1      Creatinine 0.97      GFR Estimate 76      Calcium 8.9      Chloride 108 (*)     Glucose 90      Alkaline Phosphatase 60      AST 23      ALT 14      Protein Total 6.3 (*)     Albumin 3.7      Bilirubin Total 0.4     CBC WITH PLATELETS AND DIFFERENTIAL - Abnormal    WBC Count 4.7      RBC Count 3.77 (*)     Hemoglobin 11.8 (*)     Hematocrit 37.0 (*)     MCV 98      MCH 31.3      MCHC 31.9      RDW 14.2      Platelet Count 125 (*)     % Neutrophils 55      % Lymphocytes 32      % Monocytes 11      % Eosinophils 2      % Basophils 0      % Immature Granulocytes 0      NRBCs per 100 WBC 0      Absolute Neutrophils 2.6      Absolute Lymphocytes 1.5      Absolute Monocytes 0.5      Absolute Eosinophils 0.1      Absolute Basophils 0.0      Absolute Immature Granulocytes 0.0      Absolute NRBCs 0.0     MAGNESIUM - Normal    Magnesium 1.9         Imaging  CTA Head Neck w Contrast   Final Result   IMPRESSION: No vascular occlusion findings intracranially. No   hemodynamically significant stenosis in the neck.      DUGLAS ALEGRIA MD            SYSTEM ID:  [id number]      CT Head w/o Contrast   Final Result   IMPRESSION:   1.  No CT evidence for acute intracranial process.   2.  Brain atrophy, chronic infarcts, and presumed chronic  microvascular ischemic changes as above.          EKG   ECG results from 05/08/24   EKG 12-lead, tracing only     Value    Systolic Blood Pressure     Diastolic Blood Pressure     Ventricular Rate 62    Atrial Rate 62    KY Interval 196    QRS Duration 180        QTc 517    P Axis     R AXIS -84    T Axis 84    Interpretation ECG      AV dual-paced rhythm with occasional ventricular-paced complexes  Abnormal ECG  When compared with ECG of 29-SEP-2022 12:19,  No significant change was found       Independent Interpretation  None  ED Course    Medications Administered  Medications   iopamidol (ISOVUE-370) solution 500 mL (67 mLs Intravenous $Given 5/8/24 1613)   CT scan flush (80 mLs Intravenous $Given 5/8/24 1613)   sodium chloride 0.9% BOLUS 1,000 mL (0 mLs Intravenous Stopped 5/8/24 8525)       Procedures  Procedures     Discussion of Management  See ED course    Social Determinants of Health adding to complexity of care  None    ED Course  ED Course as of 05/09/24 0021   Wed May 08, 2024   1458 I obtained patient history and performed a physical exam.    1713 Discussed patient with stroke neurologist Dr. Vasquez given patient is unable to undergo MRI imaging in the ER today due to pacemaker. Based on patient's history and examination findings, stroke neurology recommends no need for admission at this time.  Patient will not need repeat head CT in the morning.  Patient may follow-up instead outpatient in primary neurologist's clinic in addition to dizzy clinic referral.  Especially given patient is already on Xarelto, patient is cleared for discharge per stroke neurology.   1713 Notified by RN of positive orthostatic dizziness with blood pressure drop from 170 systolic to 150 systolic going from laying to standing. IV fluid bolus ordered.  Initially 1 L was ordered, however, on further chart review, given patient's reduced EF, only 500 mL was administered especially given patient's history of pitting edema  and CHF.   1740 On reevaluation, patient feeling better.  Patient is able to go from sitting to standing without return of dizziness.  Patient given confirms that his dizziness is only present going from laying to standing and is not present at rest.  Symptoms most consistent with orthostatic dizziness.  Unlikely CVA/TIA.  Vies for patient to continue to follow-up outpatient with PCP in addition to dizzy clinic.  There is always a possibility of increased orthostatic symptoms due to polypharmacy.  Advised for patient to follow-up with PCP for further medication reconciliation.  Discussed return precautions.  Answered all questions.  Patient and wife voiced understanding and agreement with plan and comfortable with discharge home.     Medical Decision Making / Diagnosis   CMS Diagnoses: None    MIPS     None    MDM  Benjamin Elizabeth is a 89 year old male as described above presents to the emergency department for dizziness since yesterday.  Patient hemodynamically stable at time of evaluation.  Afebrile.  No focal neurological deficits at time of my evaluation and patient endorses symptom resolution.  NIHSS score of 0.  While symptoms today most consistent with orthostatic type dizziness versus BPPV, initial plan was to proceed with MRI/MRA for patient's room spinning type dizziness given patient's prior history of suspected CVA. However, after discussion with MRI technician, unfortunately, given patient's pacemaker, patient is unable to undergo MRI imaging at [facility].  As a result, we will proceed with CT head and CTA head and neck for evaluation for major vessel occlusion/stenosis.  Consider stroke neurology consultation if patient still symptomatic.  If suspicion of CVA increases such as new onset worsening and persistent room spinning dizziness unaffected by body position changes, consider potential need for transfer to [facility] for admission/MRI imaging.  Dizziness workup.  Patient has no  chest pain or shortness of breath complaints or near syncope complaints indicating need for cardiac enzyme testing.  Nonetheless, will interrogate pacemaker. Discussed care plan with patient who voiced understanding and agreement with plan.  Answered all questions.  Additional workup and orders as listed in chart.    Please refer to ED course above as part of continuation of MDM for details on the patient's treatment course and any changes or updates in care plan beyond my initial evaluation and MDM creation.    Disposition  The patient was discharged.     ICD-10 Codes:    ICD-10-CM    1. Orthostatic dizziness  R42            Discharge Medications  Discharge Medication List as of 5/8/2024  6:01 PM            Scribe Disclosure:  I, Meena Hardy, am serving as a scribe at 3:21 PM on 5/8/2024 to document services personally performed by London Jones DO based on my observations and the provider's statements to me.     [employer]      London Jones DO  05/09/24 0021

## 2024-05-08 NOTE — PROGRESS NOTES
Latitude Consult received on 5/8/24 from [facility] after patient presented to the ED for evaluation of dizziness. Latitude Consult received, results as below:    [employer] Accolade (D)  Presenting Rhythm: AP- 60s, 1 PVC  Battery Status: 11.5 years  Leads: stable    Mode: DDDR     AP: 79%    : 98%    Episodes: 4 mode switch episodes, the most recent occurred on 4/29 at 2016. EGMs show AFL/AT with controlled V rates. Xarelto.  Heart Rate: stable      Stable device check.  ANGEL Nunn

## 2024-05-09 LAB
ATRIAL RATE - MUSE: 62 BPM
DIASTOLIC BLOOD PRESSURE - MUSE: NORMAL MMHG
INTERPRETATION ECG - MUSE: NORMAL
P AXIS - MUSE: NORMAL DEGREES
PR INTERVAL - MUSE: 196 MS
QRS DURATION - MUSE: 180 MS
QT - MUSE: 510 MS
QTC - MUSE: 517 MS
R AXIS - MUSE: -84 DEGREES
SYSTOLIC BLOOD PRESSURE - MUSE: NORMAL MMHG
T AXIS - MUSE: 84 DEGREES
VENTRICULAR RATE- MUSE: 62 BPM

## 2024-05-13 ENCOUNTER — TELEPHONE (OUTPATIENT)
Dept: CARDIOLOGY | Facility: CLINIC | Age: 89
End: 2024-05-13
Payer: COMMERCIAL

## 2024-05-13 DIAGNOSIS — I48.91 ATRIAL FIBRILLATION (H): ICD-10-CM

## 2024-05-13 NOTE — TELEPHONE ENCOUNTER
Health Call Center    Phone Message    May a detailed message be left on voicemail: yes     Reason for Call: Medication Question or concern regarding medication   Prescription Clarification  Name of Medication: rivaroxaban ANTICOAGULANT (XARELTO) 20 MG TABS tablet [[id number]] (Order [id number]   Prescribing Provider: Fatemeh      What on the order needs clarification? Pt was dizzy and had a headache which caused him to go to the ED. Provider stated pt should go over his medications with providers and this is the most recently changed medication. Pt was wondering if possibly a different medication option or dosage. Please call pt back to discuss       Action Taken: Other: cardiology     Travel Screening: Not Applicable                                                              Thank you!  [facility]

## 2024-05-14 NOTE — TELEPHONE ENCOUNTER
5/14/24 Spoke w pt who explained dizziness for a few days before ER visit and day of events. He stated after IVF given he felt better and has been doing ok since then . He checks his BP each day and systolic BP runs about 120/  [facility] who suggested he check with us regarding Xarelto as possible cause of dizziness as this has been only recent med change.  Will have courtesy device check done today to check for arrhythmias and then have Maricarmen Weldon NP review  Pt was previously scheduled OV w her on 6/3, advised him to check BP 1-2 x/day and record and bring log to OV w her  Notified device tech of request for courtesy device check  Pt voiced understanding and agreement with plan.   Jimmie 9 am

## 2024-05-14 NOTE — TELEPHONE ENCOUNTER
5/14/24 Spoke w pt and reviewed recommendations from Maricarmen. He will take the conservative approach and stay hydrated and be careful with position changes . Questioned the need for blood thinner and explained stroke risk . He will continue for now ( refill sent) and will discuss further at OV w Maricarmen on 6/3   Pt voiced understanding and agreement with plan.   Jimmie 420 pm

## 2024-05-14 NOTE — TELEPHONE ENCOUNTER
Xarelto is unlikely to cause dizziness unless there is a bleeding issue.    Our options include:    Transitioning to Eliquis 5 mg twice daily to see if symptoms improve  Conservative management: Adequate oral hydration, slow positional changes, compression stockings  Decreasing losartan to 25 mg daily in the p.m.    TONYA Shipley, CNP

## 2024-05-14 NOTE — TELEPHONE ENCOUNTER
Completed courtesy device check due to sxs. PPM is working WNL, no arhythmia logged. Pt is in normal sinus rhythm, AP/. Spoke with pt and gave him results who states he feels good so far today.

## 2024-06-03 ENCOUNTER — OFFICE VISIT (OUTPATIENT)
Dept: CARDIOLOGY | Facility: CLINIC | Age: 89
End: 2024-06-03
Payer: COMMERCIAL

## 2024-06-03 VITALS
BODY MASS INDEX: 25.45 KG/M2 | HEART RATE: 65 BPM | HEIGHT: 73 IN | DIASTOLIC BLOOD PRESSURE: 60 MMHG | OXYGEN SATURATION: 100 % | WEIGHT: 192 LBS | RESPIRATION RATE: 15 BRPM | SYSTOLIC BLOOD PRESSURE: 130 MMHG

## 2024-06-03 DIAGNOSIS — Z95.0 CARDIAC PACEMAKER IN SITU: ICD-10-CM

## 2024-06-03 DIAGNOSIS — I77.810 ASCENDING AORTA DILATATION (H): ICD-10-CM

## 2024-06-03 DIAGNOSIS — I48.0 PAROXYSMAL ATRIAL FIBRILLATION (H): Primary | ICD-10-CM

## 2024-06-03 DIAGNOSIS — I51.9 ASYMPTOMATIC LV DYSFUNCTION: ICD-10-CM

## 2024-06-03 DIAGNOSIS — I42.8 NICM (NONISCHEMIC CARDIOMYOPATHY) (H): ICD-10-CM

## 2024-06-03 DIAGNOSIS — I44.1 SECOND DEGREE ATRIOVENTRICULAR BLOCK: ICD-10-CM

## 2024-06-03 PROCEDURE — 99215 OFFICE O/P EST HI 40 MIN: CPT | Performed by: NURSE PRACTITIONER

## 2024-06-03 RX ORDER — ASPIRIN 81 MG/1
81 TABLET ORAL DAILY
COMMUNITY

## 2024-06-03 NOTE — PROGRESS NOTES
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 ([place])  Secondary cardiomyopathy: LVEF initially 30 to 35% in 2022 and most recent assessment LVEF was 35 to 40% from 11/2023  Paroxysmal atrial fibrillation: Incidentally noted on PPM check from 4/2024  [id number] score 6 (age++, CVA ++, CAD, hypertension)  History of chronic embolic CVA: Diagnosed in 9/2022 on imaging  Hypertension  Hyperlipidemia  Nonobstructive CAD: Noted on cath from 2019  Aortic root and ascending aortic dilation: Aortic root 4.3 cm and aorta 4.5 cm from 11/2023  Lower extremity edema    In 9/2022 he presented to [facility] with dizziness and gait instability.  His echo noted a decline in his LVEF from 55 to 60% down to 30 to 35% and was initiated on GDMT with losartan and metoprolol.  CT and MRI head both noted chronic cortical infarcts concerning for previous embolic CVAs.  He was also found to have symptomatic second-degree heart block with 2-1 AV block and underwent a dual-chamber permanent pacemaker during the admission.    On 4/15/2024 the patient atrial fibrillation for approximately 3 hours It was recommended that he start anticoagulation given a [id number] score of 6.  The patient was given option of Xarelto or Eliquis and he elected for Xarelto given the once a day dosing.     He presented to the ED on 5/8/2024 with concerns for sudden onset of dizziness \"room spinning\" that intermittently lasted for approximately 30 seconds.  It was felt that his symptoms were likely orthostatic dizziness versus BPPV.  An MRI initially was to be obtained, but due to the patient's PPM the MRI cannot be completed at  " [facility].    The patient was evaluated by PCP on 5/13 and reported labile blood pressures ranging from 113-to 150s over the 60s.  Thankfully, he has had no episodes of  recurrent dizziness.  He has chronic lower extremity edema and his PCP was afraid that with adding a scheduled diuretic he may become dehydrated and drop his blood pressure resulting in more dizziness and increased risk for falling.  The patient was concerned that Xarelto had been causing his symptoms and the PCP encouraged him to reach out to cardiology to talk about alternatives.  Ultimately, the patient ended up stopping Xarelto prior to consulting cardiology.  He reports that since stopping the Xarelto his symptoms have resolved.    Today he presents accompanied by his wife.  They have a multitude of questions regarding atrial fibrillation, rationale for anticoagulation, side effects of Xarelto and lower extremity edema.  We discussed rationale for anticoagulation in the setting of paroxysmal atrial fibrillation particularly as asymptomatic and his increased risk of a CVA given his [id number] score of 6.  He is very concerned that all of similar side effects on other DOACs.  We discussed consultation to discuss left atrial occluder, but he states he does not want to undergo the invasive procedure at his age.    Diagnotic studies:  EKG 5/8/2024: AV dual paced rhythm.  Device check: AP 81%,  98% battery status of 11.5 years.  Echocardiogram 11/2023: LVEF of 35 to 40% with mild MR, mild AR.  Aortic root 4.3 cm and aorta 4.5 cm  Limited echocardiogram 9/30/2022: LVEF 30 to 35% with severe hypokinesis in the mid to apical segments of the LV  Echocardiogram 9/20/2022: LVEF of 55 to 60% without wall motion abnormalities.  Mild biatrial enlargement.  Mild aortic root dilatation with ascending aorta moderately dilated.  Aortic root 4.2 cm and ascending aorta 4.5 cm   CT head: Previous cortical infarcts           Assessment and Plan:      ASSESSMENT:    Paroxysmal atrial fibrillation  Incidentally noted on device check from 4/2024  [id number] Score 6 and was initially started on Xarelto 20 mg daily.  Patient self discontinued due to concern for dizziness.    Second-degree degree AV block  Status post dual-chamber [employer] permanent pacemaker in 9/2022   Most recent device check as above    Hypertension  Well-controlled  Currently on losartan 37.5 mg daily and metoprolol XL 12.5 mg daily    Nonischemic cardiomyopathy  Noted on echo from 9/30/2022 of LVEF of 30 to 35% that had dropped from 9/20/2022 of LV EF of 55 to 60% and remains 35 to 40% on most recent echocardiogram from 11/2023.  GDMT is limited by orthostatic hypotension  Concern for mild lower extremity edema without other signs and symptoms of heart failure, has chronic and stable lower extremity edema    Ascending aorta and aortic root dilatation  Aortic root 4.3 cm and aorta 4.5 cm 11/2024    PLAN:   As needed Lasix 20 mg daily for lower extremity edema and would recommend Ace wraps as they were unable to get compression stockings on.  Consider Eliquis and if wishes to proceed a 30-day free trial card provided to the patient and his wife today.  If they like to start the medication we will send a prescription.  They could also consider other options including Coumadin or Watchman device.   Establish care with new EP MD in approximate 3 to 4 months with repeat echocardiogram and could consider CRT-P upgrade if LVEF does not improve.       Orders this Visit:  Orders Placed This Encounter   Procedures    Follow-Up with Cardiology EP     Orders Placed This Encounter   Medications    aspirin 81 MG EC tablet     Sig: Take 81 mg by mouth daily     There are no discontinued medications.    Today's clinic visit entailed:  Review of the result(s) of each unique test - Echo, device check Labs  Assessment requiring an independent historian(s) - significant other - Wife  Ordering of each  "unique test  Prescription drug management  45 minutes spent by me on the date of the encounter doing chart review, history and exam, documentation and further activities per the note  Provider  Link to [facility] Help Grid     The level of medical decision making during this visit was of moderate complexity.           Review of Systems:     Review of Systems:  Skin:  not assessed     Eyes:  not assessed    ENT:  not assessed    Respiratory:  Positive for cough;dyspnea on exertion  Cardiovascular:    edema;Positive for;dizziness;lightheadedness  Gastroenterology: not assessed    Genitourinary:  not assessed    Musculoskeletal:  not assessed    Neurologic:  Positive for stroke;incoordination  Psychiatric:  not assessed    Heme/Lymph/Imm:  not assessed    Endocrine:  Negative              Physical Exam:     Vitals: BP (!) 156/69   Pulse 65   Resp 15   Ht 1.854 m (6' 1\")   Wt 87.1 kg (192 lb)   SpO2 100%   BMI 25.33 kg/m    Constitutional: Well nourished and in no apparent distress.  Eyes: Pupils equal, round. Sclerae anicteric.   HEENT: Normocephalic, atraumatic.   Neck: Supple.  Respiratory: Breathing non-labored. Lungs clear to auscultation bilaterally. No crackles, wheezes, rhonchi, or rales.  Cardiovascular:  Regular rate and rhythm, normal S1 and S2. No murmur, rub, or gallop. PPM site CDI  Skin: Warm, dry. No rashes, cyanosis, or xanthelasma.  Extremities: 1+ LE Edema  Neurologic: No gross motor deficits. Alert, awake, and oriented to person, place and time.  Psychiatric: Affect appropriate.        CURRENT MEDICATIONS:  Current Outpatient Medications   Medication Sig Dispense Refill    aspirin 81 MG EC tablet Take 81 mg by mouth daily      atorvastatin (LIPITOR) 40 MG tablet Take 40 mg by mouth daily (with dinner)      furosemide (LASIX) 40 MG tablet Take 1 tablet by mouth daily at 2 pm PRN - based on edema, daily activities      multivitamin, therapeutic (THERA-VIT) TABS tablet Take 1 tablet by mouth daily (with " dinner)      losartan (COZAAR) 25 MG tablet Take 37.5 mg by mouth daily (with dinner) (Patient not taking: Reported on 6/3/2024)      methylPREDNISolone (MEDROL DOSEPAK) 4 MG tablet therapy pack Follow Package Directions (Patient not taking: Reported on 11/2/2023) 21 tablet 0    metoprolol succinate ER (TOPROL XL) 25 MG 24 hr tablet Take 0.5 tablets (12.5 mg) by mouth daily 30 tablet 0    rivaroxaban ANTICOAGULANT (XARELTO) 20 MG TABS tablet Take 1 tablet (20 mg) by mouth daily (with dinner) (Patient not taking: Reported on 6/3/2024) 30 tablet 1       ALLERGIES  No Known Allergies      PAST MEDICAL HISTORY:  No past medical history on file.    PAST SURGICAL HISTORY:  Past Surgical History:   Procedure Laterality Date    EP PACEMAKER DEVICE & LEAD IMPLANT- RIGHT ATRIAL & LEFT VENTRICULAR N/A 9/22/2022    Procedure: Pacemaker Device & Lead Implant- Right Atrial & Left Ventricular;  Surgeon: Blaze Cain MD;  Location:  HEART CARDIAC CATH LAB       FAMILY HISTORY:  No family history on file.    SOCIAL HISTORY:  Social History     Socioeconomic History    Marital status:    Tobacco Use    Smoking status: Never    Smokeless tobacco: Never   Vaping Use    Vaping status: Never Used

## 2024-06-03 NOTE — LETTER
"6/3/2024    Physician No Ref-Primary  No address on file    RE: Benjamin Elizabeth       Dear Colleague,     I had the pleasure of seeing Benjamin Elizabeth in the [facility].    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 ([place])  Secondary cardiomyopathy: LVEF initially 30 to 35% in 2022 and most recent assessment LVEF was 35 to 40% from 11/2023  Paroxysmal atrial fibrillation: Incidentally noted on PPM check from 4/2024  [id number] score 6 (age++, CVA ++, CAD, hypertension)  History of chronic embolic CVA: Diagnosed in 9/2022 on imaging  Hypertension  Hyperlipidemia  Nonobstructive CAD: Noted on cath from 2019  Aortic root and ascending aortic dilation: Aortic root 4.3 cm and aorta 4.5 cm from 11/2023  Lower extremity edema    In 9/2022 he presented to [facility] with dizziness and gait instability.  His echo noted a decline in his LVEF from 55 to 60% down to 30 to 35% and was initiated on GDMT with losartan and metoprolol.  CT and MRI head both noted chronic cortical infarcts concerning for previous embolic CVAs.  He was also found to have symptomatic second-degree heart block with 2-1 AV block and underwent a dual-chamber permanent pacemaker during the admission.    On 4/15/2024 the patient atrial fibrillation for approximately 3 hours It was recommended that he start anticoagulation given a TGE1FP6-VVXv score of 6.  The patient was given option of Xarelto or Eliquis and he elected for Xarelto given the once a day dosing.     He presented to the ED on 5/8/2024 with concerns for sudden onset of dizziness \"room spinning\" that intermittently lasted for " approximately 30 seconds.  It was felt that his symptoms were likely orthostatic dizziness versus BPPV.  An MRI initially was to be obtained, but due to the patient's PPM the MRI cannot be completed at [facility].    The patient was evaluated by PCP on 5/13 and reported labile blood pressures ranging from 113-to 150s over the 60s.  Thankfully, he has had no episodes of  recurrent dizziness.  He has chronic lower extremity edema and his PCP was afraid that with adding a scheduled diuretic he may become dehydrated and drop his blood pressure resulting in more dizziness and increased risk for falling.  The patient was concerned that Xarelto had been causing his symptoms and the PCP encouraged him to reach out to cardiology to talk about alternatives.  Ultimately, the patient ended up stopping Xarelto prior to consulting cardiology.  He reports that since stopping the Xarelto his symptoms have resolved.    Today he presents accompanied by his wife.  They have a multitude of questions regarding atrial fibrillation, rationale for anticoagulation, side effects of Xarelto and lower extremity edema.  We discussed rationale for anticoagulation in the setting of paroxysmal atrial fibrillation particularly as asymptomatic and his increased risk of a CVA given his ZAP2US2-GZWq score of 6.  He is very concerned that all of similar side effects on other DOACs.  We discussed consultation to discuss left atrial occluder, but he states he does not want to undergo the invasive procedure at his age.    Diagnotic studies:  EKG 5/8/2024: AV dual paced rhythm.  Device check: AP 81%,  98% battery status of 11.5 years.  Echocardiogram 11/2023: LVEF of 35 to 40% with mild MR, mild AR.  Aortic root 4.3 cm and aorta 4.5 cm  Limited echocardiogram 9/30/2022: LVEF 30 to 35% with severe hypokinesis in the mid to apical segments of the LV  Echocardiogram 9/20/2022: LVEF of 55 to 60% without wall motion abnormalities.  Mild biatrial  enlargement.  Mild aortic root dilatation with ascending aorta moderately dilated.  Aortic root 4.2 cm and ascending aorta 4.5 cm   CT head: Previous cortical infarcts           Assessment and Plan:     ASSESSMENT:    Paroxysmal atrial fibrillation  Incidentally noted on device check from 4/2024  [id number] Score 6 and was initially started on Xarelto 20 mg daily.  Patient self discontinued due to concern for dizziness.    Second-degree degree AV block  Status post dual-chamber [employer] permanent pacemaker in 9/2022   Most recent device check as above    Hypertension  Well-controlled  Currently on losartan 37.5 mg daily and metoprolol XL 12.5 mg daily    Nonischemic cardiomyopathy  Noted on echo from 9/30/2022 of LVEF of 30 to 35% that had dropped from 9/20/2022 of LV EF of 55 to 60% and remains 35 to 40% on most recent echocardiogram from 11/2023.  GDMT is limited by orthostatic hypotension  Concern for mild lower extremity edema without other signs and symptoms of heart failure, has chronic and stable lower extremity edema    Ascending aorta and aortic root dilatation  Aortic root 4.3 cm and aorta 4.5 cm 11/2024    PLAN:   As needed Lasix 20 mg daily for lower extremity edema and would recommend Ace wraps as they were unable to get compression stockings on.  Consider Eliquis and if wishes to proceed a 30-day free trial card provided to the patient and his wife today.  If they like to start the medication we will send a prescription.  They could also consider other options including Coumadin or Watchman device.   Establish care with new EP MD in approximate 3 to 4 months with repeat echocardiogram and could consider CRT-P upgrade if LVEF does not improve.       Orders this Visit:  Orders Placed This Encounter   Procedures    Follow-Up with Cardiology EP     Orders Placed This Encounter   Medications    aspirin 81 MG EC tablet     Sig: Take 81 mg by mouth daily     There are no discontinued  "medications.    Today's clinic visit entailed:  Review of the result(s) of each unique test - Echo, device check Labs  Assessment requiring an independent historian(s) - significant other - Wife  Ordering of each unique test  Prescription drug management  45 minutes spent by me on the date of the encounter doing chart review, history and exam, documentation and further activities per the note  Provider  Link to [facility] Help Grid     The level of medical decision making during this visit was of moderate complexity.           Review of Systems:     Review of Systems:  Skin:  not assessed     Eyes:  not assessed    ENT:  not assessed    Respiratory:  Positive for cough;dyspnea on exertion  Cardiovascular:    edema;Positive for;dizziness;lightheadedness  Gastroenterology: not assessed    Genitourinary:  not assessed    Musculoskeletal:  not assessed    Neurologic:  Positive for stroke;incoordination  Psychiatric:  not assessed    Heme/Lymph/Imm:  not assessed    Endocrine:  Negative              Physical Exam:     Vitals: BP (!) 156/69   Pulse 65   Resp 15   Ht 1.854 m (6' 1\")   Wt 87.1 kg (192 lb)   SpO2 100%   BMI 25.33 kg/m    Constitutional: Well nourished and in no apparent distress.  Eyes: Pupils equal, round. Sclerae anicteric.   HEENT: Normocephalic, atraumatic.   Neck: Supple.  Respiratory: Breathing non-labored. Lungs clear to auscultation bilaterally. No crackles, wheezes, rhonchi, or rales.  Cardiovascular:  Regular rate and rhythm, normal S1 and S2. No murmur, rub, or gallop. PPM site CDI  Skin: Warm, dry. No rashes, cyanosis, or xanthelasma.  Extremities: 1+ LE Edema  Neurologic: No gross motor deficits. Alert, awake, and oriented to person, place and time.  Psychiatric: Affect appropriate.        CURRENT MEDICATIONS:  Current Outpatient Medications   Medication Sig Dispense Refill    aspirin 81 MG EC tablet Take 81 mg by mouth daily      atorvastatin (LIPITOR) 40 MG tablet Take 40 mg by mouth daily " (with dinner)      furosemide (LASIX) 40 MG tablet Take 1 tablet by mouth daily at 2 pm PRN - based on edema, daily activities      multivitamin, therapeutic (THERA-VIT) TABS tablet Take 1 tablet by mouth daily (with dinner)      losartan (COZAAR) 25 MG tablet Take 37.5 mg by mouth daily (with dinner) (Patient not taking: Reported on 6/3/2024)      methylPREDNISolone (MEDROL DOSEPAK) 4 MG tablet therapy pack Follow Package Directions (Patient not taking: Reported on 11/2/2023) 21 tablet 0    metoprolol succinate ER (TOPROL XL) 25 MG 24 hr tablet Take 0.5 tablets (12.5 mg) by mouth daily 30 tablet 0    rivaroxaban ANTICOAGULANT (XARELTO) 20 MG TABS tablet Take 1 tablet (20 mg) by mouth daily (with dinner) (Patient not taking: Reported on 6/3/2024) 30 tablet 1       ALLERGIES  No Known Allergies      PAST MEDICAL HISTORY:  No past medical history on file.    PAST SURGICAL HISTORY:  Past Surgical History:   Procedure Laterality Date    EP PACEMAKER DEVICE & LEAD IMPLANT- RIGHT ATRIAL & LEFT VENTRICULAR N/A 9/22/2022    Procedure: Pacemaker Device & Lead Implant- Right Atrial & Left Ventricular;  Surgeon: Blaze Cain MD;  Location: [facility] CARDIAC CATH LAB       FAMILY HISTORY:  No family history on file.    SOCIAL HISTORY:  Social History     Socioeconomic History    Marital status:    Tobacco Use    Smoking status: Never    Smokeless tobacco: Never   Vaping Use    Vaping status: Never Used               Thank you for allowing me to participate in the care of your patient.      Sincerely,     TONYA Han CNP     [facility]  cc:   TONYA Shipley CNP  [address]  [place] [address]

## 2024-06-03 NOTE — PATIENT INSTRUCTIONS
Today's Recommendations    Consider Eliquis for a blood thinner instead of Xarelto. Other options include Coumadin or Watchman device (occlusion of the left atrial appendage).  Continue all other medications without changes.  Establish care with new electrical doctor.     Please send Hoverink message or call for further questions or concerns.     It was a pleasure to see you today.     [place]-    Maricarmen Weldon, APRN, CNP    EP [phone]  Device -[phone]  General scheduling and after hours number [phone]  EP scheduling [phone]

## 2024-06-19 ENCOUNTER — ANCILLARY PROCEDURE (OUTPATIENT)
Dept: CARDIOLOGY | Facility: CLINIC | Age: 89
End: 2024-06-19
Attending: INTERNAL MEDICINE
Payer: COMMERCIAL

## 2024-06-19 DIAGNOSIS — Z95.0 CARDIAC PACEMAKER IN SITU: ICD-10-CM

## 2024-06-19 DIAGNOSIS — I44.1 SECOND DEGREE ATRIOVENTRICULAR BLOCK: ICD-10-CM

## 2024-06-19 DIAGNOSIS — Z95.0 CARDIAC PACEMAKER IN SITU: Primary | ICD-10-CM

## 2024-06-19 PROCEDURE — 93296 REM INTERROG EVL PM/IDS: CPT | Performed by: INTERNAL MEDICINE

## 2024-06-19 PROCEDURE — 93294 REM INTERROG EVL PM/LDLS PM: CPT | Performed by: INTERNAL MEDICINE

## 2024-06-20 LAB
MDC_IDC_EPISODE_DTM: NORMAL
MDC_IDC_EPISODE_ID: NORMAL
MDC_IDC_EPISODE_TYPE: NORMAL
MDC_IDC_LEAD_CONNECTION_STATUS: NORMAL
MDC_IDC_LEAD_CONNECTION_STATUS: NORMAL
MDC_IDC_LEAD_IMPLANT_DT: NORMAL
MDC_IDC_LEAD_IMPLANT_DT: NORMAL
MDC_IDC_LEAD_LOCATION: NORMAL
MDC_IDC_LEAD_LOCATION: NORMAL
MDC_IDC_LEAD_LOCATION_DETAIL_1: NORMAL
MDC_IDC_LEAD_LOCATION_DETAIL_1: NORMAL
MDC_IDC_LEAD_MFG: NORMAL
MDC_IDC_LEAD_MFG: NORMAL
MDC_IDC_LEAD_MODEL: NORMAL
MDC_IDC_LEAD_MODEL: NORMAL
MDC_IDC_LEAD_POLARITY_TYPE: NORMAL
MDC_IDC_LEAD_POLARITY_TYPE: NORMAL
MDC_IDC_LEAD_SERIAL: NORMAL
MDC_IDC_LEAD_SERIAL: NORMAL
MDC_IDC_MSMT_BATTERY_DTM: NORMAL
MDC_IDC_MSMT_BATTERY_REMAINING_LONGEVITY: 132 MO
MDC_IDC_MSMT_BATTERY_REMAINING_PERCENTAGE: 100 %
MDC_IDC_MSMT_BATTERY_STATUS: NORMAL
MDC_IDC_MSMT_LEADCHNL_RA_IMPEDANCE_VALUE: 612 OHM
MDC_IDC_MSMT_LEADCHNL_RA_PACING_THRESHOLD_AMPLITUDE: 0.4 V
MDC_IDC_MSMT_LEADCHNL_RA_PACING_THRESHOLD_PULSEWIDTH: 0.4 MS
MDC_IDC_MSMT_LEADCHNL_RV_IMPEDANCE_VALUE: 678 OHM
MDC_IDC_MSMT_LEADCHNL_RV_PACING_THRESHOLD_AMPLITUDE: 0.8 V
MDC_IDC_MSMT_LEADCHNL_RV_PACING_THRESHOLD_PULSEWIDTH: 0.4 MS
MDC_IDC_PG_IMPLANT_DTM: NORMAL
MDC_IDC_PG_MFG: NORMAL
MDC_IDC_PG_MODEL: NORMAL
MDC_IDC_PG_SERIAL: NORMAL
MDC_IDC_PG_TYPE: NORMAL
MDC_IDC_SESS_CLINIC_NAME: NORMAL
MDC_IDC_SESS_DTM: NORMAL
MDC_IDC_SESS_TYPE: NORMAL
MDC_IDC_SET_BRADY_AT_MODE_SWITCH_MODE: NORMAL
MDC_IDC_SET_BRADY_AT_MODE_SWITCH_RATE: 170 {BEATS}/MIN
MDC_IDC_SET_BRADY_LOWRATE: 60 {BEATS}/MIN
MDC_IDC_SET_BRADY_MAX_SENSOR_RATE: 130 {BEATS}/MIN
MDC_IDC_SET_BRADY_MAX_TRACKING_RATE: 130 {BEATS}/MIN
MDC_IDC_SET_BRADY_MODE: NORMAL
MDC_IDC_SET_BRADY_PAV_DELAY_HIGH: 150 MS
MDC_IDC_SET_BRADY_PAV_DELAY_LOW: 200 MS
MDC_IDC_SET_BRADY_SAV_DELAY_HIGH: 150 MS
MDC_IDC_SET_BRADY_SAV_DELAY_LOW: 200 MS
MDC_IDC_SET_LEADCHNL_RA_PACING_AMPLITUDE: 2 V
MDC_IDC_SET_LEADCHNL_RA_PACING_CAPTURE_MODE: NORMAL
MDC_IDC_SET_LEADCHNL_RA_PACING_POLARITY: NORMAL
MDC_IDC_SET_LEADCHNL_RA_PACING_PULSEWIDTH: 0.4 MS
MDC_IDC_SET_LEADCHNL_RA_SENSING_ADAPTATION_MODE: NORMAL
MDC_IDC_SET_LEADCHNL_RA_SENSING_POLARITY: NORMAL
MDC_IDC_SET_LEADCHNL_RA_SENSING_SENSITIVITY: 0.25 MV
MDC_IDC_SET_LEADCHNL_RV_PACING_AMPLITUDE: 1.2 V
MDC_IDC_SET_LEADCHNL_RV_PACING_CAPTURE_MODE: NORMAL
MDC_IDC_SET_LEADCHNL_RV_PACING_POLARITY: NORMAL
MDC_IDC_SET_LEADCHNL_RV_PACING_PULSEWIDTH: 0.4 MS
MDC_IDC_SET_LEADCHNL_RV_SENSING_ADAPTATION_MODE: NORMAL
MDC_IDC_SET_LEADCHNL_RV_SENSING_POLARITY: NORMAL
MDC_IDC_SET_LEADCHNL_RV_SENSING_SENSITIVITY: 1.5 MV
MDC_IDC_SET_ZONE_DETECTION_INTERVAL: 375 MS
MDC_IDC_SET_ZONE_STATUS: NORMAL
MDC_IDC_SET_ZONE_TYPE: NORMAL
MDC_IDC_SET_ZONE_VENDOR_TYPE: NORMAL
MDC_IDC_STAT_AT_BURDEN_PERCENT: 1 %
MDC_IDC_STAT_AT_DTM_END: NORMAL
MDC_IDC_STAT_AT_DTM_START: NORMAL
MDC_IDC_STAT_BRADY_DTM_END: NORMAL
MDC_IDC_STAT_BRADY_DTM_START: NORMAL
MDC_IDC_STAT_BRADY_RA_PERCENT_PACED: 79 %
MDC_IDC_STAT_BRADY_RV_PERCENT_PACED: 98 %
MDC_IDC_STAT_EPISODE_RECENT_COUNT: 0
MDC_IDC_STAT_EPISODE_RECENT_COUNT_DTM_END: NORMAL
MDC_IDC_STAT_EPISODE_RECENT_COUNT_DTM_START: NORMAL
MDC_IDC_STAT_EPISODE_TYPE: NORMAL
MDC_IDC_STAT_EPISODE_VENDOR_TYPE: NORMAL
MDC_IDC_STAT_EPISODE_VENDOR_TYPE: NORMAL

## 2024-07-22 ENCOUNTER — APPOINTMENT (OUTPATIENT)
Dept: CT IMAGING | Facility: CLINIC | Age: 89
End: 2024-07-22
Attending: EMERGENCY MEDICINE
Payer: COMMERCIAL

## 2024-07-22 ENCOUNTER — HOSPITAL ENCOUNTER (EMERGENCY)
Facility: CLINIC | Age: 89
Discharge: HOME | End: 2024-07-22
Attending: EMERGENCY MEDICINE | Admitting: EMERGENCY MEDICINE
Payer: COMMERCIAL

## 2024-07-22 VITALS
TEMPERATURE: 97.8 F | HEART RATE: 60 BPM | DIASTOLIC BLOOD PRESSURE: 87 MMHG | SYSTOLIC BLOOD PRESSURE: 157 MMHG | RESPIRATION RATE: 26 BRPM | OXYGEN SATURATION: 96 %

## 2024-07-22 DIAGNOSIS — R42 DIZZINESS: ICD-10-CM

## 2024-07-22 LAB
ANION GAP SERPL CALCULATED.3IONS-SCNC: 10 MMOL/L (ref 7–15)
ATRIAL RATE - MUSE: 60 BPM
BASOPHILS # BLD AUTO: 0 10E3/UL (ref 0–0.2)
BASOPHILS NFR BLD AUTO: 0 %
BUN SERPL-MCNC: 18.6 MG/DL (ref 8–23)
CALCIUM SERPL-MCNC: 9.1 MG/DL (ref 8.8–10.4)
CHLORIDE SERPL-SCNC: 105 MMOL/L (ref 98–107)
CREAT SERPL-MCNC: 1.03 MG/DL (ref 0.67–1.17)
DIASTOLIC BLOOD PRESSURE - MUSE: NORMAL MMHG
EGFRCR SERPLBLD CKD-EPI 2021: 70 ML/MIN/1.73M2
EOSINOPHIL # BLD AUTO: 0.1 10E3/UL (ref 0–0.7)
EOSINOPHIL NFR BLD AUTO: 1 %
ERYTHROCYTE [DISTWIDTH] IN BLOOD BY AUTOMATED COUNT: 14.6 % (ref 10–15)
GLUCOSE SERPL-MCNC: 180 MG/DL (ref 70–99)
HCO3 SERPL-SCNC: 24 MMOL/L (ref 22–29)
HCT VFR BLD AUTO: 38.8 % (ref 40–53)
HGB BLD-MCNC: 12.4 G/DL (ref 13.3–17.7)
HOLD SPECIMEN: NORMAL
HOLD SPECIMEN: NORMAL
IMM GRANULOCYTES # BLD: 0 10E3/UL
IMM GRANULOCYTES NFR BLD: 0 %
INTERPRETATION ECG - MUSE: NORMAL
LYMPHOCYTES # BLD AUTO: 1.2 10E3/UL (ref 0.8–5.3)
LYMPHOCYTES NFR BLD AUTO: 28 %
MCH RBC QN AUTO: 31.2 PG (ref 26.5–33)
MCHC RBC AUTO-ENTMCNC: 32 G/DL (ref 31.5–36.5)
MCV RBC AUTO: 98 FL (ref 78–100)
MONOCYTES # BLD AUTO: 0.3 10E3/UL (ref 0–1.3)
MONOCYTES NFR BLD AUTO: 7 %
NEUTROPHILS # BLD AUTO: 2.7 10E3/UL (ref 1.6–8.3)
NEUTROPHILS NFR BLD AUTO: 63 %
NRBC # BLD AUTO: 0 10E3/UL
NRBC BLD AUTO-RTO: 0 /100
P AXIS - MUSE: 49 DEGREES
PLAT MORPH BLD: NORMAL
PLATELET # BLD AUTO: 128 10E3/UL (ref 150–450)
POTASSIUM SERPL-SCNC: 4.4 MMOL/L (ref 3.4–5.3)
PR INTERVAL - MUSE: 212 MS
QRS DURATION - MUSE: 162 MS
QT - MUSE: 496 MS
QTC - MUSE: 496 MS
R AXIS - MUSE: -88 DEGREES
RBC # BLD AUTO: 3.98 10E6/UL (ref 4.4–5.9)
RBC MORPH BLD: NORMAL
SODIUM SERPL-SCNC: 139 MMOL/L (ref 135–145)
SYSTOLIC BLOOD PRESSURE - MUSE: NORMAL MMHG
T AXIS - MUSE: 72 DEGREES
VENTRICULAR RATE- MUSE: 60 BPM
WBC # BLD AUTO: 4.3 10E3/UL (ref 4–11)

## 2024-07-22 PROCEDURE — 70496 CT ANGIOGRAPHY HEAD: CPT

## 2024-07-22 PROCEDURE — 82374 ASSAY BLOOD CARBON DIOXIDE: CPT | Performed by: EMERGENCY MEDICINE

## 2024-07-22 PROCEDURE — 70450 CT HEAD/BRAIN W/O DYE: CPT

## 2024-07-22 PROCEDURE — 93005 ELECTROCARDIOGRAM TRACING: CPT

## 2024-07-22 PROCEDURE — 250N000013 HC RX MED GY IP 250 OP 250 PS 637: Performed by: EMERGENCY MEDICINE

## 2024-07-22 PROCEDURE — 99285 EMERGENCY DEPT VISIT HI MDM: CPT | Mod: 25

## 2024-07-22 PROCEDURE — 250N000011 HC RX IP 250 OP 636: Performed by: EMERGENCY MEDICINE

## 2024-07-22 PROCEDURE — 36415 COLL VENOUS BLD VENIPUNCTURE: CPT | Performed by: EMERGENCY MEDICINE

## 2024-07-22 PROCEDURE — 85004 AUTOMATED DIFF WBC COUNT: CPT | Performed by: EMERGENCY MEDICINE

## 2024-07-22 PROCEDURE — 82565 ASSAY OF CREATININE: CPT | Performed by: EMERGENCY MEDICINE

## 2024-07-22 RX ORDER — MECLIZINE HYDROCHLORIDE 25 MG/1
25 TABLET ORAL 3 TIMES DAILY PRN
Qty: 10 TABLET | Refills: 0 | Status: SHIPPED | OUTPATIENT
Start: 2024-07-22 | End: 2024-09-09

## 2024-07-22 RX ORDER — IOPAMIDOL 755 MG/ML
67 INJECTION, SOLUTION INTRAVASCULAR ONCE
Status: COMPLETED | OUTPATIENT
Start: 2024-07-22 | End: 2024-07-22

## 2024-07-22 RX ORDER — MECLIZINE HYDROCHLORIDE 25 MG/1
25 TABLET ORAL ONCE
Status: COMPLETED | OUTPATIENT
Start: 2024-07-22 | End: 2024-07-22

## 2024-07-22 RX ADMIN — IOPAMIDOL 67 ML: 755 INJECTION, SOLUTION INTRAVENOUS at 13:45

## 2024-07-22 RX ADMIN — MECLIZINE HYDROCHLORIDE 25 MG: 25 TABLET ORAL at 12:48

## 2024-07-22 ASSESSMENT — ACTIVITIES OF DAILY LIVING (ADL)
ADLS_ACUITY_SCORE: 38
ADLS_ACUITY_SCORE: 38

## 2024-07-22 ASSESSMENT — COLUMBIA-SUICIDE SEVERITY RATING SCALE - C-SSRS
2. HAVE YOU ACTUALLY HAD ANY THOUGHTS OF KILLING YOURSELF IN THE PAST MONTH?: NO
1. IN THE PAST MONTH, HAVE YOU WISHED YOU WERE DEAD OR WISHED YOU COULD GO TO SLEEP AND NOT WAKE UP?: NO
6. HAVE YOU EVER DONE ANYTHING, STARTED TO DO ANYTHING, OR PREPARED TO DO ANYTHING TO END YOUR LIFE?: NO

## 2024-07-22 NOTE — ED PROVIDER NOTES
Emergency Department Note      History of Present Illness     Chief Complaint   Dizziness      HPI   Benjamin Elizabeth is a 89 year old male with a history of anemia, dyslipidemia, hypertension, and CVA who presents to the ED with his wife for evaluation of dizziness. The patient states he started to feel dizzy, unsteady, and lightheaded this morning that is worsening with head movement and standing to quickly. No room-spinning dizziness. Reports chronic bilateral leg weakness that intermittently worsens with prolonged sitting. Today the leg weakness is worse with increased bilateral lower extremity edema despite no long periods of sitting. Notes he was unable to get down the stairs today due to his symptoms. He was able to mow the lawn yesterday and went to bed last night with no symptoms. His wife states he was diagnosed with vertigo many years ago but it resolved spontaneously without medication or treatment. Patient denies headache, tinnitus, or recent sinus congestion.     Independent Historian   Wife as detailed above.    Review of External Notes   I reviewed the ED note from 5/8/24 for orthostatic dizziness.    Past Medical History     Medical History and Problem List   Anemia  Ascending aorta dilation  BPH  Cervicalgia  CAD  DDD, lumbar  Dyslipidemia  SCC  Aortic valve insufficiency  Mitral valve regurgitation  HTN  CVA    Medications   Aspirin 81 mg  Lipitor  Furosemide     Surgical History   Pacemaker placement    Physical Exam     Patient Vitals for the past 24 hrs:   BP Temp Temp src Pulse Resp SpO2   07/22/24 1434 -- -- -- -- -- 96 %   07/22/24 1433 -- -- -- -- -- 97 %   07/22/24 1426 -- -- -- 60 26 100 %   07/22/24 1421 -- -- -- 60 14 98 %   07/22/24 1416 (!) 157/87 -- -- -- 28 100 %   07/22/24 1330 (!) 140/130 -- -- (!) 121 17 --   07/22/24 1329 -- -- -- 60 14 --   07/22/24 1232 (!) 148/80 -- -- 62 -- 98 %   07/22/24 1217 (!) 141/77 -- -- -- -- --   07/22/24 1215 -- 97.8  F (36.6  C) Temporal 65 20  98 %     Physical Exam  Constitutional: Alert, attentive  HENT: TMs clear bilaterally   Nose: Nose normal.    Mouth/Throat: Oropharynx is clear, mucous membranes are moist  Eyes: EOM are normal. Pupils are equal, round, and reactive to light.   CV: Regular rate and rhythm, no murmurs, rubs or gallops.  Chest: Effort normal and breath sounds normal.   GI: No distension. There is no tenderness  MSK: Normal range of motion.   Neurological:   A/Ox3;   Cranial nerves 2-12 intact;   5/5 strength throughout the upper and lower extremities;   sensation intact to light touch throughout the upper and lower extremities;   normal fine motor coordination intact bilaterally;   normal gait with walker  No meningismus   Skin: Skin is warm and dry.      Diagnostics     Lab Results   Labs Ordered and Resulted from Time of ED Arrival to Time of ED Departure   BASIC METABOLIC PANEL - Abnormal       Result Value    Sodium 139      Potassium 4.4      Chloride 105      Carbon Dioxide (CO2) 24      Anion Gap 10      Urea Nitrogen 18.6      Creatinine 1.03      GFR Estimate 70      Calcium 9.1      Glucose 180 (*)    CBC WITH PLATELETS AND DIFFERENTIAL - Abnormal    WBC Count 4.3      RBC Count 3.98 (*)     Hemoglobin 12.4 (*)     Hematocrit 38.8 (*)     MCV 98      MCH 31.2      MCHC 32.0      RDW 14.6      Platelet Count 128 (*)     % Neutrophils 63      % Lymphocytes 28      % Monocytes 7      % Eosinophils 1      % Basophils 0      % Immature Granulocytes 0      NRBCs per 100 WBC 0      Absolute Neutrophils 2.7      Absolute Lymphocytes 1.2      Absolute Monocytes 0.3      Absolute Eosinophils 0.1      Absolute Basophils 0.0      Absolute Immature Granulocytes 0.0      Absolute NRBCs 0.0     RBC AND PLATELET MORPHOLOGY    RBC Morphology Confirmed RBC Indices      Platelet Assessment        Value: Automated Count Confirmed. Platelet morphology is normal.       Imaging   CT Head w/o Contrast   Final Result   IMPRESSION:      1. No  "evidence of acute intracranial hemorrhage, mass, or herniation.   2. Moderate diffuse parenchymal volume loss and white matter changes   likely due to chronic microvascular ischemic disease.    3. Chronic left frontal and parietal lobe infarcts.      GAIL ESPINAL MD            SYSTEM ID:  [id number]      CTA Head Neck with Contrast   Final Result   IMPRESSION:    1. Patent arteries in the neck without evidence of dissection. Mild   atherosclerotic disease in the carotid arteries bilaterally without   significant stenosis by NASCET criteria.   2. Patent proximal major intracranial arteries without vascular   cutoff. No significant stenosis. No aneurysm identified.       GAIL ESPINAL MD            SYSTEM ID:  [id number]          EKG   ECG taken at 1225, ECG read at 1240  AV dual-paced rhythm with prolonged AV conduction   No significant change as compared to prior, dated 8/5/24.  Rate 60 bpm. LA interval 212 ms. QRS duration 162 ms. QT/QTc 496/496 ms. P-R-T axes 49 -88 72.    Independent Interpretation   No intracranial hemorrhage on CT head    ED Course      Medications Administered   Medications   meclizine (ANTIVERT) tablet 25 mg (25 mg Oral $Given 7/22/24 1248)   iopamidol (ISOVUE-370) solution 67 mL (67 mLs Intravenous $Given 7/22/24 1345)   sodium chloride (PF) 0.9% PF flush 80 mL (80 mLs Intravenous $Given 7/22/24 1345)       Procedures   Procedures     Discussion of Management   None    ED Course   ED Course as of 07/22/24 1451   Mon Jul 22, 2024   1228 I obtained history and performed a physical exam as noted above.    1436 I rechecked and updated the patient.        Optional/Additional Documentation  None    Medical Decision Making / Diagnosis     CMS Diagnoses: None    MIPS       None    [facility]   Benjamin Elizabeth is a 89 year old male with remote history of vertigo presents for evaluation of dizziness that is vertiginous in nature, described as feeling \"off balance.\"  He has had this chronically in the past but " felt worse today.  He has a normal neurologic exam.  Screening labs show no concerning abnormality that might of exacerbated dizziness or cause weakness.  He has no infectious prodrome or symptoms.  Because of his pacemaker status, CT head and CTA head and neck were performed.  These fortunately showed no acute abnormality to explain his symptoms.  Subtle infarct cannot be ruled out without MRI.  On recheck, his symptoms are significant improved after supportive cares.  Will plan for continuation of meclizine at home and primary care follow-up in 3 to 5 days.  Return precautions for worse dizziness, stroke symptoms, or any other concerns.    Disposition   The patient was discharged.     Diagnosis     ICD-10-CM    1. Dizziness  R42            Discharge Medications   Discharge Medication List as of 7/22/2024  2:40 PM        START taking these medications    Details   meclizine (ANTIVERT) 25 MG tablet Take 1 tablet (25 mg) by mouth 3 times daily as needed, Disp-10 tablet, R-0, E-Prescribe               Scribe Disclosure:  I, Andreea Howard, am serving as a scribe at 12:34 PM on 7/22/2024 to document services personally performed by Gregory Arnold MD based on my observations and the provider's statements to me.        Gregory Arnold MD  07/22/24 [id number]

## 2024-07-22 NOTE — ED NOTES
Pt independently ambulated well with assistance of a walker. No gait issues or unsteadiness noted. RN/MD notified.

## 2024-07-22 NOTE — DISCHARGE INSTRUCTIONS
It was a pleasure taking care of you today. I hope you feel much better soon.  Take meclizine as prescribed for vertigo.  Please follow-up with your primary care doctor in 3-5 days.  Return immediately for worse dizziness or any other concerns.

## 2024-07-22 NOTE — ED TRIAGE NOTES
Pt presents to ED with dizziness and feeling off balance. Felt well before going to bed last night. But woke up and states he had no balance and was very dizzy. Thought it would get better throughout the day, but it was not better. States yesterday he was able to mow the lawn.

## 2024-09-09 ENCOUNTER — ANCILLARY PROCEDURE (OUTPATIENT)
Dept: CARDIOLOGY | Facility: CLINIC | Age: 89
End: 2024-09-09
Attending: INTERNAL MEDICINE
Payer: COMMERCIAL

## 2024-09-09 ENCOUNTER — OFFICE VISIT (OUTPATIENT)
Dept: CARDIOLOGY | Facility: CLINIC | Age: 89
End: 2024-09-09
Attending: NURSE PRACTITIONER
Payer: COMMERCIAL

## 2024-09-09 VITALS
DIASTOLIC BLOOD PRESSURE: 68 MMHG | HEART RATE: 60 BPM | BODY MASS INDEX: 25.58 KG/M2 | HEIGHT: 73 IN | WEIGHT: 193 LBS | SYSTOLIC BLOOD PRESSURE: 170 MMHG | OXYGEN SATURATION: 99 %

## 2024-09-09 DIAGNOSIS — I44.1 SECOND DEGREE ATRIOVENTRICULAR BLOCK: ICD-10-CM

## 2024-09-09 DIAGNOSIS — I48.0 PAROXYSMAL ATRIAL FIBRILLATION (H): Primary | ICD-10-CM

## 2024-09-09 DIAGNOSIS — Z79.01 ANTICOAGULATION MANAGEMENT ENCOUNTER: ICD-10-CM

## 2024-09-09 DIAGNOSIS — I10 BENIGN ESSENTIAL HYPERTENSION: ICD-10-CM

## 2024-09-09 DIAGNOSIS — Z95.0 CARDIAC PACEMAKER IN SITU: ICD-10-CM

## 2024-09-09 DIAGNOSIS — I48.0 PAROXYSMAL ATRIAL FIBRILLATION (H): ICD-10-CM

## 2024-09-09 DIAGNOSIS — Z95.0 CARDIAC PACEMAKER IN SITU: Primary | ICD-10-CM

## 2024-09-09 DIAGNOSIS — Z51.81 ANTICOAGULATION MANAGEMENT ENCOUNTER: ICD-10-CM

## 2024-09-09 DIAGNOSIS — R42 DIZZINESS: ICD-10-CM

## 2024-09-09 PROCEDURE — 99417 PROLNG OP E/M EACH 15 MIN: CPT | Mod: 25 | Performed by: INTERNAL MEDICINE

## 2024-09-09 PROCEDURE — 99215 OFFICE O/P EST HI 40 MIN: CPT | Mod: 25 | Performed by: INTERNAL MEDICINE

## 2024-09-09 PROCEDURE — 93280 PM DEVICE PROGR EVAL DUAL: CPT | Performed by: INTERNAL MEDICINE

## 2024-09-09 NOTE — PROGRESS NOTES
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  He previously followed with Dr. Cain in clinic.  He was admitted to [facility] in 9/2022 with dizziness and gait instability.  His echocardiogram showed new cardiomyopathy with LVEF 30 to 35%.  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  He was also found to have symptomatic 2-1 AV block and underwent dual-chamber pacemaker implant.  He was noted to have atrial fibrillation lasting 3 hours on pacemaker check in 4/2024.  He was started on Xarelto for anticoagulation.  In 5/2023, the patient was concerned that Xarelto may be contributing to his lower extremity edema so he stopped taking it.  Last saw Maricarmen Candice on 6/3/2024 and discussed options for anticoagulation.        Today's Visit:  He is accompanied by his wife today.  He states he fainted while he was on the Xarelto in April.  He was in the yard, felt dizzy.  He thinks he was only out for a few seconds and he was able to get himself up.  He did feel dizzy afterwards.  No further episodes of dizziness since then.  He is very reluctant to take any anticoagulants right now because he is very certain that the Xarelto cause dizziness and they read that Eliquis can also cause dizziness.  He does not have any history of bleeding issues.  He thinks that he stopped taking losartan prior to his syncopal episode, however it wasn't discontinued in our records until 6/2024.  He does not recall ever taking metoprolol.  According to our records it was started in 2022 and  discontinued in 6/2024 by Maricarmen.  Atorvastatin was stopped in July.  He does not take any medications right now.          Diagnostic Testing:  EKG 7/22/2024-AV paced rhythm rate 60 bpm    Device interrogation from today  [employer] Accolade (D) Pacemaker Device Check  Patient seen in clinic for device evaluation and iterative programming.   AP: 78 %    : 98 %    Mode: DDDR     Underlying Rhythm: SR 60's with CHB with JE @ VVI 30  Heart Rate: fair variability  HR's 60-80  Sensing: WNL    Pacing Threshold: WNL    Impedance: WNL    Battery Status: 11 yrs estimated longevity     Device Site: [facility]    Atrial Arrhythmia: 1 mode switch logged 8/12/2024 @ 00:55 with EGM showing AFib with V rates 70 lasting 49 seconds. Pt has history of afib and had been on Xarelto but stopped this due to side effects. Was recommended to go on Eliquis but decided not to. Watch for increase in episodes and length of episodes.    Ventricular Arrhythmia: 0    Setting Change: none         6/19/2024  [employer] Accolade (D) Remote PPM Device Check  AP: 79%  : 98%  Mode: DDDR 60/130  Presenting Rhythm: AP/ & AS/ w/ PAC & PVCs  Historical underlying rhythm: SB 50's, long 1st degree AVB (480ms), frequent PACs as of 11/2023  Heart Rate: adequate rates per histograms   Sensing: stable in atrial, not recorded in ventricle  Pacing Threshold: stable   Impedance: stable   Battery Status: 11 years remaining   Atrial Arrhythmia: 4 mode switch episodes logged comprising <1% of the time. EGMs show As>Vs for afib longest episode lasted [id number], ventricular rates controlled. Patient was started on xarelto 20mg 4/17/24. OV w/ Maricarmen Ekman, APRN CNP 6/3/24, pt could also consider eliquis instead of xarelto. Neither eliquis or xarelto are listed on medication list.   Ventricular Arrhythmia: none       Echocardiogram 11/27/2023    Left ventricular systolic function is moderately reduced.  The visual ejection fraction is 35-40%.  On direct  comparison to priro study, EF is marginally higher.  There is mild (1+) mitral regurgitation.  There is mild (1+) aortic regurgitation.  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        Last Comprehensive Metabolic Panel:  Lab Results   Component Value Date     07/22/2024    POTASSIUM 4.4 07/22/2024    CHLORIDE 105 07/22/2024    CO2 24 07/22/2024    ANIONGAP 10 07/22/2024     (H) 07/22/2024    BUN 18.6 07/22/2024    CR 1.03 07/22/2024    GFRESTIMATED 70 07/22/2024    ERASTO 9.1 07/22/2024        Magnesium   Date Value Ref Range Status   05/08/2024 1.9 1.7 - 2.3 mg/dL Final        TSH   Date Value Ref Range Status   09/20/2022 3.94 0.30 - 4.20 uIU/mL Final                Assessment/Plan:    89 year old male with history of hypertension, hyperlipidemia, CVA, nonobstructive CAD, nonischemic cardiomyopathy (EF 30-35%, now improved to 35-40%), paroxysmal atrial fibrillation, 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  His recent EKG shows an AV paced rhythm.  Device check showed normal function, he is paced the majority of the time.  He had 1 short episode of A-fib lasting less than 1 minute.  He did have an episode of A-fib lasting 3 hours on his pacemaker check and April.    We discussed the pathophysiology of atrial fibrillation and the long term implications.  I did reiterate that he had a more persistent episode of atrial fibrillation back in April and that is the reason that we are concerned about his stroke risk, and not necessarily because of the very short episodes seen on his recent pacemaker check.   I discussed management strategies of rate versus rhythm control.  He has been asymptomatic from the atrial fibrillation and does not require any additional rate control given his heart block.    We discussed that one of the major risks of Afib is  that of stroke.  The patient's CHADS VASc is 6 (age, CHF, HTN, CVA), which warrants long term anticoagulation.  I explained that he has significant risk for another stroke, and frequently Afib related strokes can be very devastating.  We spent much of the visit today discussing the risks and benefits of anticoagulation and the options for different medications.  He had dizziness and had an episode of syncope while he was taking Xarelto, and is very reluctant to start any anticoagulation at this time.  I suggested trying Eliquis or warfarin instead, which are different from Xarelto and may not cause the same side effects.  They will think about it and let us know if they want to start anticoagulation.  We also discussed the option of left atrial appendage occlusion.  I explained the purpose of the device, implant procedure, risks/benefits.  They were not interested in watchman implant at this time.    His blood pressure is elevated today, likely because he was recently taken off of his antihypertensive medications.  Given his recent episodes of dizziness and syncope, I think it is reasonable to continue lenient blood pressure control.  I did encourage him to contact us when he changes/stops his medications so that we can keep an accurate record of it.  I will defer further management to his PCP.    Consider starting Eliquis 5 mg BID  Continue routine device checks and follow up      Karl Guthrie MD        Orders this Visit:  No orders of the defined types were placed in this encounter.    No orders of the defined types were placed in this encounter.    There are no discontinued medications.    Encounter Diagnoses   Name Primary?    Paroxysmal atrial fibrillation (H) Yes    Anticoagulation management encounter     Cardiac pacemaker in situ     Benign essential hypertension     Dizziness      Today's clinic visit entailed:  Review of the result(s) of each unique test - echo  Assessment requiring an independent  "historian(s) - family -    The following tests were independently interpreted by me as noted in my documentation: EKG, device check  70 minutes spent by me on the date of the encounter doing chart review, history and exam, documentation and further activities per the note    CURRENT MEDICATIONS:  Current Outpatient Medications   Medication Sig Dispense Refill    aspirin 81 MG EC tablet Take 81 mg by mouth daily      multivitamin, therapeutic (THERA-VIT) TABS tablet Take 1 tablet by mouth daily (with dinner)      atorvastatin (LIPITOR) 40 MG tablet Take 40 mg by mouth daily (with dinner) (Patient not taking: Reported on 9/9/2024)      furosemide (LASIX) 40 MG tablet Take 1 tablet by mouth daily at 2 pm PRN - based on edema, daily activities (Patient not taking: Reported on 9/9/2024)      meclizine (ANTIVERT) 25 MG tablet Take 1 tablet (25 mg) by mouth 3 times daily as needed (Patient not taking: Reported on 9/9/2024) 10 tablet 0    methylPREDNISolone (MEDROL DOSEPAK) 4 MG tablet therapy pack Follow Package Directions (Patient not taking: Reported on 11/2/2023) 21 tablet 0       Review of Systems:  Pertinent review of system as stated above in HPI    Skin:        Eyes:       ENT:       Respiratory:       Cardiovascular:       Gastroenterology:      Genitourinary:       Musculoskeletal:       Neurologic:       Psychiatric:       Heme/Lymph/Imm:       Endocrine:         Physical Exam:  Vitals: BP (!) 170/68   Pulse 60   Ht 1.854 m (6' 1\")   Wt 87.5 kg (193 lb)   SpO2 99%   BMI 25.46 kg/m      Constitutional: Pleasant, no apparent distress.  Respiratory: Breathing non-labored.   Cardiovascular:  Regular rate and rhythm  Neurologic: No gross motor deficits. Alert, awake, and answers questions appropriately.  Psychiatric: Affect appropriate.        ALLERGIES  No Known Allergies    PAST MEDICAL HISTORY:  No past medical history on file.    PAST SURGICAL HISTORY:  Past Surgical History:   Procedure Laterality Date    EP " PACEMAKER DEVICE & LEAD IMPLANT- RIGHT ATRIAL & LEFT VENTRICULAR N/A 9/22/2022    Procedure: Pacemaker Device & Lead Implant- Right Atrial & Left Ventricular;  Surgeon: Blaze Cain MD;  Location:  HEART CARDIAC CATH LAB       FAMILY HISTORY:  No family history on file.    SOCIAL HISTORY:  Social History     Socioeconomic History    Marital status:    Tobacco Use    Smoking status: Never    Smokeless tobacco: Never   Vaping Use    Vaping status: Never Used             CC  Maricarmen Weldon, APRN CNP  [address]  [place] [address]

## 2024-09-09 NOTE — LETTER
9/9/2024    [facility]  [address]  [facility] [address]    RE: Benjamin Elizabeth       Dear Colleague,     I had the pleasure of seeing Benjamin Elizabeth in the [facility].  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  He previously followed with Dr. Cain in clinic.  He was admitted to [facility] in 9/2022 with dizziness and gait instability.  His echocardiogram showed new cardiomyopathy with LVEF 30 to 35%.  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  He was also found to have symptomatic 2-1 AV block and underwent dual-chamber pacemaker implant.  He was noted to have atrial fibrillation lasting 3 hours on pacemaker check in 4/2024.  He was started on Xarelto for anticoagulation.  In 5/2023, the patient was concerned that Xarelto may be contributing to his lower extremity edema so he stopped taking it.  Last saw Maricarmen Candice on 6/3/2024 and discussed options for anticoagulation.        Today's Visit:  He is accompanied by his wife today.  He states he fainted while he was on the Xarelto in April.  He was in the yard, felt dizzy.  He thinks he was only out for a few seconds and he was able to get himself up.  He did feel dizzy afterwards.  No further episodes of dizziness since then.  He is very reluctant to take any anticoagulants right now because he is very certain that the Xarelto cause dizziness and they read that Eliquis can also cause dizziness.  He does not have any history of bleeding issues.  He thinks that  he stopped taking losartan prior to his syncopal episode, however it wasn't discontinued in our records until 6/2024.  He does not recall ever taking metoprolol.  According to our records it was started in 2022 and discontinued in 6/2024 by Maricarmen.  Atorvastatin was stopped in July.  He does not take any medications right now.          Diagnostic Testing:  EKG 7/22/2024-AV paced rhythm rate 60 bpm    Device interrogation from today  [employer] Accolade (D) Pacemaker Device Check  Patient seen in clinic for device evaluation and iterative programming.   AP: 78 %    : 98 %    Mode: DDDR     Underlying Rhythm: SR 60's with CHB with JE @ VVI 30  Heart Rate: fair variability  HR's 60-80  Sensing: WNL    Pacing Threshold: WNL    Impedance: WNL    Battery Status: 11 yrs estimated longevity     Device Site: [facility]    Atrial Arrhythmia: 1 mode switch logged 8/12/2024 @ 00:55 with EGM showing AFib with V rates 70 lasting 49 seconds. Pt has history of afib and had been on Xarelto but stopped this due to side effects. Was recommended to go on Eliquis but decided not to. Watch for increase in episodes and length of episodes.    Ventricular Arrhythmia: 0    Setting Change: none         6/19/2024  [employer] Accolade (D) Remote PPM Device Check  AP: 79%  : 98%  Mode: DDDR 60/130  Presenting Rhythm: AP/ & AS/ w/ PAC & PVCs  Historical underlying rhythm: SB 50's, long 1st degree AVB (480ms), frequent PACs as of 11/2023  Heart Rate: adequate rates per histograms   Sensing: stable in atrial, not recorded in ventricle  Pacing Threshold: stable   Impedance: stable   Battery Status: 11 years remaining   Atrial Arrhythmia: 4 mode switch episodes logged comprising <1% of the time. EGMs show As>Vs for afib longest episode lasted [id number], ventricular rates controlled. Patient was started on xarelto 20mg 4/17/24. OV w/ Maricarmen Weldon, APRN CNP 6/3/24, pt could also consider eliquis instead of xarelto. Neither eliquis or  xarelto are listed on medication list.   Ventricular Arrhythmia: none       Echocardiogram 11/27/2023    Left ventricular systolic function is moderately reduced.  The visual ejection fraction is 35-40%.  On direct comparison to priro study, EF is marginally higher.  There is mild (1+) mitral regurgitation.  There is mild (1+) aortic regurgitation.  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        Last Comprehensive Metabolic Panel:  Lab Results   Component Value Date     07/22/2024    POTASSIUM 4.4 07/22/2024    CHLORIDE 105 07/22/2024    CO2 24 07/22/2024    ANIONGAP 10 07/22/2024     (H) 07/22/2024    BUN 18.6 07/22/2024    CR 1.03 07/22/2024    GFRESTIMATED 70 07/22/2024    ERASTO 9.1 07/22/2024        Magnesium   Date Value Ref Range Status   05/08/2024 1.9 1.7 - 2.3 mg/dL Final        TSH   Date Value Ref Range Status   09/20/2022 3.94 0.30 - 4.20 uIU/mL Final                Assessment/Plan:    89 year old male with history of hypertension, hyperlipidemia, CVA, nonobstructive CAD, nonischemic cardiomyopathy (EF 30-35%, now improved to 35-40%), paroxysmal atrial fibrillation, 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  His recent EKG shows an AV paced rhythm.  Device check showed normal function, he is paced the majority of the time.  He had 1 short episode of A-fib lasting less than 1 minute.  He did have an episode of A-fib lasting 3 hours on his pacemaker check and April.    We discussed the pathophysiology of atrial fibrillation and the long term implications.  I did reiterate that he had a more persistent episode of atrial fibrillation back in April and that is the reason that we are concerned about his stroke risk, and not necessarily because of the very short episodes seen on his recent pacemaker check.   I discussed management strategies  of rate versus rhythm control.  He has been asymptomatic from the atrial fibrillation and does not require any additional rate control given his heart block.    We discussed that one of the major risks of Afib is that of stroke.  The patient's CHADS VASc is 6 (age, CHF, HTN, CVA), which warrants long term anticoagulation.  I explained that he has significant risk for another stroke, and frequently Afib related strokes can be very devastating.  We spent much of the visit today discussing the risks and benefits of anticoagulation and the options for different medications.  He had dizziness and had an episode of syncope while he was taking Xarelto, and is very reluctant to start any anticoagulation at this time.  I suggested trying Eliquis or warfarin instead, which are different from Xarelto and may not cause the same side effects.  They will think about it and let us know if they want to start anticoagulation.  We also discussed the option of left atrial appendage occlusion.  I explained the purpose of the device, implant procedure, risks/benefits.  They were not interested in watchman implant at this time.    His blood pressure is elevated today, likely because he was recently taken off of his antihypertensive medications.  Given his recent episodes of dizziness and syncope, I think it is reasonable to continue lenient blood pressure control.  I did encourage him to contact us when he changes/stops his medications so that we can keep an accurate record of it.  I will defer further management to his PCP.    Consider starting Eliquis 5 mg BID  Continue routine device checks and follow up      Karl Guthrie MD        Orders this Visit:  No orders of the defined types were placed in this encounter.    No orders of the defined types were placed in this encounter.    There are no discontinued medications.    Encounter Diagnoses   Name Primary?     Paroxysmal atrial fibrillation (H) Yes     Anticoagulation management encounter  "     Cardiac pacemaker in situ      Benign essential hypertension      Dizziness      Today's clinic visit entailed:  Review of the result(s) of each unique test - echo  Assessment requiring an independent historian(s) - family -    The following tests were independently interpreted by me as noted in my documentation: EKG, device check  70 minutes spent by me on the date of the encounter doing chart review, history and exam, documentation and further activities per the note    CURRENT MEDICATIONS:  Current Outpatient Medications   Medication Sig Dispense Refill     aspirin 81 MG EC tablet Take 81 mg by mouth daily       multivitamin, therapeutic (THERA-VIT) TABS tablet Take 1 tablet by mouth daily (with dinner)       atorvastatin (LIPITOR) 40 MG tablet Take 40 mg by mouth daily (with dinner) (Patient not taking: Reported on 9/9/2024)       furosemide (LASIX) 40 MG tablet Take 1 tablet by mouth daily at 2 pm PRN - based on edema, daily activities (Patient not taking: Reported on 9/9/2024)       meclizine (ANTIVERT) 25 MG tablet Take 1 tablet (25 mg) by mouth 3 times daily as needed (Patient not taking: Reported on 9/9/2024) 10 tablet 0     methylPREDNISolone (MEDROL DOSEPAK) 4 MG tablet therapy pack Follow Package Directions (Patient not taking: Reported on 11/2/2023) 21 tablet 0       Review of Systems:  Pertinent review of system as stated above in HPI    Skin:        Eyes:       ENT:       Respiratory:       Cardiovascular:       Gastroenterology:      Genitourinary:       Musculoskeletal:       Neurologic:       Psychiatric:       Heme/Lymph/Imm:       Endocrine:         Physical Exam:  Vitals: BP (!) 170/68   Pulse 60   Ht 1.854 m (6' 1\")   Wt 87.5 kg (193 lb)   SpO2 99%   BMI 25.46 kg/m      Constitutional: Pleasant, no apparent distress.  Respiratory: Breathing non-labored.   Cardiovascular:  Regular rate and rhythm  Neurologic: No gross motor deficits. Alert, awake, and answers questions " appropriately.  Psychiatric: Affect appropriate.        ALLERGIES  No Known Allergies    PAST MEDICAL HISTORY:  No past medical history on file.    PAST SURGICAL HISTORY:  Past Surgical History:   Procedure Laterality Date     EP PACEMAKER DEVICE & LEAD IMPLANT- RIGHT ATRIAL & LEFT VENTRICULAR N/A 9/22/2022    Procedure: Pacemaker Device & Lead Implant- Right Atrial & Left Ventricular;  Surgeon: Blaze Cain MD;  Location: [facility] CARDIAC CATH LAB       FAMILY HISTORY:  No family history on file.    SOCIAL HISTORY:  Social History     Socioeconomic History     Marital status:    Tobacco Use     Smoking status: Never     Smokeless tobacco: Never   Vaping Use     Vaping status: Never Used             CC  TONYA Shipley CNP  [address]  [place] [address]      Thank you for allowing me to participate in the care of your patient.      Sincerely,     Karl Guthrie MD     [facility]  cc:   TONYA Shipley CNP  [address]  [place] [address]

## 2024-09-18 LAB
MDC_IDC_LEAD_CONNECTION_STATUS: NORMAL
MDC_IDC_LEAD_CONNECTION_STATUS: NORMAL
MDC_IDC_LEAD_IMPLANT_DT: NORMAL
MDC_IDC_LEAD_IMPLANT_DT: NORMAL
MDC_IDC_LEAD_LOCATION: NORMAL
MDC_IDC_LEAD_LOCATION: NORMAL
MDC_IDC_LEAD_LOCATION_DETAIL_1: NORMAL
MDC_IDC_LEAD_LOCATION_DETAIL_1: NORMAL
MDC_IDC_LEAD_MFG: NORMAL
MDC_IDC_LEAD_MFG: NORMAL
MDC_IDC_LEAD_MODEL: NORMAL
MDC_IDC_LEAD_MODEL: NORMAL
MDC_IDC_LEAD_POLARITY_TYPE: NORMAL
MDC_IDC_LEAD_POLARITY_TYPE: NORMAL
MDC_IDC_LEAD_SERIAL: NORMAL
MDC_IDC_LEAD_SERIAL: NORMAL
MDC_IDC_MSMT_BATTERY_DTM: NORMAL
MDC_IDC_MSMT_BATTERY_REMAINING_LONGEVITY: 132 MO
MDC_IDC_MSMT_BATTERY_REMAINING_PERCENTAGE: 100 %
MDC_IDC_MSMT_BATTERY_STATUS: NORMAL
MDC_IDC_MSMT_LEADCHNL_RA_IMPEDANCE_VALUE: 648 OHM
MDC_IDC_MSMT_LEADCHNL_RA_PACING_THRESHOLD_AMPLITUDE: 0.4 V
MDC_IDC_MSMT_LEADCHNL_RA_PACING_THRESHOLD_PULSEWIDTH: 0.4 MS
MDC_IDC_MSMT_LEADCHNL_RV_IMPEDANCE_VALUE: 673 OHM
MDC_IDC_MSMT_LEADCHNL_RV_LEAD_CHANNEL_STATUS: NORMAL
MDC_IDC_MSMT_LEADCHNL_RV_PACING_THRESHOLD_AMPLITUDE: 0.6 V
MDC_IDC_MSMT_LEADCHNL_RV_PACING_THRESHOLD_PULSEWIDTH: 0.4 MS
MDC_IDC_PG_IMPLANT_DTM: NORMAL
MDC_IDC_PG_MFG: NORMAL
MDC_IDC_PG_MODEL: NORMAL
MDC_IDC_PG_SERIAL: NORMAL
MDC_IDC_PG_TYPE: NORMAL
MDC_IDC_SESS_CLINIC_NAME: NORMAL
MDC_IDC_SESS_DTM: NORMAL
MDC_IDC_SESS_TYPE: NORMAL
MDC_IDC_SET_BRADY_AT_MODE_SWITCH_MODE: NORMAL
MDC_IDC_SET_BRADY_AT_MODE_SWITCH_RATE: 170 {BEATS}/MIN
MDC_IDC_SET_BRADY_LOWRATE: 60 {BEATS}/MIN
MDC_IDC_SET_BRADY_MAX_SENSOR_RATE: 130 {BEATS}/MIN
MDC_IDC_SET_BRADY_MAX_TRACKING_RATE: 130 {BEATS}/MIN
MDC_IDC_SET_BRADY_MODE: NORMAL
MDC_IDC_SET_BRADY_PAV_DELAY_HIGH: 150 MS
MDC_IDC_SET_BRADY_PAV_DELAY_LOW: 200 MS
MDC_IDC_SET_BRADY_SAV_DELAY_HIGH: 150 MS
MDC_IDC_SET_BRADY_SAV_DELAY_LOW: 200 MS
MDC_IDC_SET_LEADCHNL_RA_PACING_AMPLITUDE: 2 V
MDC_IDC_SET_LEADCHNL_RA_PACING_CAPTURE_MODE: NORMAL
MDC_IDC_SET_LEADCHNL_RA_PACING_POLARITY: NORMAL
MDC_IDC_SET_LEADCHNL_RA_PACING_PULSEWIDTH: 0.4 MS
MDC_IDC_SET_LEADCHNL_RA_SENSING_ADAPTATION_MODE: NORMAL
MDC_IDC_SET_LEADCHNL_RA_SENSING_POLARITY: NORMAL
MDC_IDC_SET_LEADCHNL_RA_SENSING_SENSITIVITY: 0.25 MV
MDC_IDC_SET_LEADCHNL_RV_PACING_AMPLITUDE: 1.2 V
MDC_IDC_SET_LEADCHNL_RV_PACING_CAPTURE_MODE: NORMAL
MDC_IDC_SET_LEADCHNL_RV_PACING_POLARITY: NORMAL
MDC_IDC_SET_LEADCHNL_RV_PACING_PULSEWIDTH: 0.4 MS
MDC_IDC_SET_LEADCHNL_RV_SENSING_ADAPTATION_MODE: NORMAL
MDC_IDC_SET_LEADCHNL_RV_SENSING_POLARITY: NORMAL
MDC_IDC_SET_LEADCHNL_RV_SENSING_SENSITIVITY: 1.5 MV
MDC_IDC_SET_ZONE_DETECTION_INTERVAL: 375 MS
MDC_IDC_SET_ZONE_STATUS: NORMAL
MDC_IDC_SET_ZONE_TYPE: NORMAL
MDC_IDC_SET_ZONE_VENDOR_TYPE: NORMAL
MDC_IDC_STAT_AT_BURDEN_PERCENT: 1 %
MDC_IDC_STAT_AT_DTM_END: NORMAL
MDC_IDC_STAT_AT_DTM_START: NORMAL
MDC_IDC_STAT_BRADY_DTM_END: NORMAL
MDC_IDC_STAT_BRADY_DTM_START: NORMAL
MDC_IDC_STAT_BRADY_RA_PERCENT_PACED: 78 %
MDC_IDC_STAT_BRADY_RV_PERCENT_PACED: 98 %
MDC_IDC_STAT_EPISODE_RECENT_COUNT: 0
MDC_IDC_STAT_EPISODE_RECENT_COUNT_DTM_END: NORMAL
MDC_IDC_STAT_EPISODE_RECENT_COUNT_DTM_START: NORMAL
MDC_IDC_STAT_EPISODE_TYPE: NORMAL
MDC_IDC_STAT_EPISODE_VENDOR_TYPE: NORMAL
MDC_IDC_STAT_EPISODE_VENDOR_TYPE: NORMAL

## 2024-11-09 ENCOUNTER — APPOINTMENT (OUTPATIENT)
Dept: CT IMAGING | Facility: CLINIC | Age: 89
End: 2024-11-09
Attending: EMERGENCY MEDICINE
Payer: COMMERCIAL

## 2024-11-09 ENCOUNTER — HOSPITAL ENCOUNTER (EMERGENCY)
Facility: CLINIC | Age: 89
Discharge: HOME | End: 2024-11-09
Attending: EMERGENCY MEDICINE | Admitting: EMERGENCY MEDICINE
Payer: COMMERCIAL

## 2024-11-09 VITALS
BODY MASS INDEX: 25.62 KG/M2 | OXYGEN SATURATION: 99 % | WEIGHT: 193.34 LBS | RESPIRATION RATE: 16 BRPM | HEIGHT: 73 IN | DIASTOLIC BLOOD PRESSURE: 78 MMHG | TEMPERATURE: 97.2 F | SYSTOLIC BLOOD PRESSURE: 130 MMHG | HEART RATE: 70 BPM

## 2024-11-09 DIAGNOSIS — R51.9 ACUTE NONINTRACTABLE HEADACHE, UNSPECIFIED HEADACHE TYPE: ICD-10-CM

## 2024-11-09 DIAGNOSIS — D64.9 CHRONIC ANEMIA: ICD-10-CM

## 2024-11-09 LAB
ANION GAP SERPL CALCULATED.3IONS-SCNC: 10 MMOL/L (ref 7–15)
BASOPHILS # BLD AUTO: 0 10E3/UL (ref 0–0.2)
BASOPHILS NFR BLD AUTO: 0 %
BUN SERPL-MCNC: 20.9 MG/DL (ref 8–23)
CALCIUM SERPL-MCNC: 8.7 MG/DL (ref 8.8–10.4)
CHLORIDE SERPL-SCNC: 104 MMOL/L (ref 98–107)
CREAT SERPL-MCNC: 1.11 MG/DL (ref 0.67–1.17)
CRP SERPL-MCNC: <3 MG/L
EGFRCR SERPLBLD CKD-EPI 2021: 64 ML/MIN/1.73M2
EOSINOPHIL # BLD AUTO: 0.1 10E3/UL (ref 0–0.7)
EOSINOPHIL NFR BLD AUTO: 1 %
ERYTHROCYTE [DISTWIDTH] IN BLOOD BY AUTOMATED COUNT: 13.8 % (ref 10–15)
ERYTHROCYTE [SEDIMENTATION RATE] IN BLOOD BY WESTERGREN METHOD: 14 MM/HR (ref 0–20)
GLUCOSE SERPL-MCNC: 126 MG/DL (ref 70–99)
HCO3 SERPL-SCNC: 23 MMOL/L (ref 22–29)
HCT VFR BLD AUTO: 35.2 % (ref 40–53)
HGB BLD-MCNC: 11.5 G/DL (ref 13.3–17.7)
IMM GRANULOCYTES # BLD: 0 10E3/UL
IMM GRANULOCYTES NFR BLD: 0 %
LYMPHOCYTES # BLD AUTO: 1.1 10E3/UL (ref 0.8–5.3)
LYMPHOCYTES NFR BLD AUTO: 25 %
MCH RBC QN AUTO: 31.4 PG (ref 26.5–33)
MCHC RBC AUTO-ENTMCNC: 32.7 G/DL (ref 31.5–36.5)
MCV RBC AUTO: 96 FL (ref 78–100)
MONOCYTES # BLD AUTO: 0.5 10E3/UL (ref 0–1.3)
MONOCYTES NFR BLD AUTO: 11 %
NEUTROPHILS # BLD AUTO: 2.6 10E3/UL (ref 1.6–8.3)
NEUTROPHILS NFR BLD AUTO: 62 %
NRBC # BLD AUTO: 0 10E3/UL
NRBC BLD AUTO-RTO: 0 /100
PLATELET # BLD AUTO: 123 10E3/UL (ref 150–450)
POTASSIUM SERPL-SCNC: 4.4 MMOL/L (ref 3.4–5.3)
RBC # BLD AUTO: 3.66 10E6/UL (ref 4.4–5.9)
SODIUM SERPL-SCNC: 137 MMOL/L (ref 135–145)
WBC # BLD AUTO: 4.2 10E3/UL (ref 4–11)

## 2024-11-09 PROCEDURE — 250N000013 HC RX MED GY IP 250 OP 250 PS 637: Performed by: EMERGENCY MEDICINE

## 2024-11-09 PROCEDURE — 99284 EMERGENCY DEPT VISIT MOD MDM: CPT | Mod: 25

## 2024-11-09 PROCEDURE — 86140 C-REACTIVE PROTEIN: CPT | Performed by: EMERGENCY MEDICINE

## 2024-11-09 PROCEDURE — 85652 RBC SED RATE AUTOMATED: CPT | Performed by: EMERGENCY MEDICINE

## 2024-11-09 PROCEDURE — 36415 COLL VENOUS BLD VENIPUNCTURE: CPT | Performed by: EMERGENCY MEDICINE

## 2024-11-09 PROCEDURE — 70450 CT HEAD/BRAIN W/O DYE: CPT

## 2024-11-09 PROCEDURE — 80048 BASIC METABOLIC PNL TOTAL CA: CPT | Performed by: EMERGENCY MEDICINE

## 2024-11-09 PROCEDURE — 85004 AUTOMATED DIFF WBC COUNT: CPT | Performed by: EMERGENCY MEDICINE

## 2024-11-09 RX ORDER — ACETAMINOPHEN 500 MG
1000 TABLET ORAL ONCE
Status: COMPLETED | OUTPATIENT
Start: 2024-11-09 | End: 2024-11-09

## 2024-11-09 RX ADMIN — ACETAMINOPHEN 1000 MG: 500 TABLET ORAL at 09:32

## 2024-11-09 ASSESSMENT — COLUMBIA-SUICIDE SEVERITY RATING SCALE - C-SSRS
6. HAVE YOU EVER DONE ANYTHING, STARTED TO DO ANYTHING, OR PREPARED TO DO ANYTHING TO END YOUR LIFE?: NO
2. HAVE YOU ACTUALLY HAD ANY THOUGHTS OF KILLING YOURSELF IN THE PAST MONTH?: NO
1. IN THE PAST MONTH, HAVE YOU WISHED YOU WERE DEAD OR WISHED YOU COULD GO TO SLEEP AND NOT WAKE UP?: NO

## 2024-11-09 ASSESSMENT — ACTIVITIES OF DAILY LIVING (ADL)
ADLS_ACUITY_SCORE: 0
ADLS_ACUITY_SCORE: 0

## 2024-11-09 NOTE — ED PROVIDER NOTES
"  Emergency Department Note      History of Present Illness     Chief Complaint  Headache    HPI  Benjamin Elizabeth is a 89 year old male with history of CAD and hypertension who presents to the ED with his wife for evaluation of headache. He reports a constant headache for the past 4-5 days along with bilateral ear ringing. Describes it as pressure. His wife reports patient's balance was worse two days ago but he is getting better. Patient uses a cane. Denies vision changes, nausea, vomiting, weakness, dizziness, fever, sore throat, cough, or chest pain. No pain when chewing. No history of migraines. No one is sick around him. He took Tylenol last night with mild relief. Patient had a fall four weeks ago and hit the back of his head. No recent use of blood thinners.     Independent Historian  Wife as detailed above.    Review of External Notes  I reviewed cardiology office visit from 9/9/2024.  This reviewed patient's history of CVA and nonischemic cardiomyopathy with paroxysmal A-fib status post pacemaker.  They recommended that he be anticoagulated but he was hesitant to take blood thinner at that time.    Past Medical History   Medical History and Problem List   CVA  Dyslipidemia  Ascending aorta dilation  Coronary artery disease  Vitamin B12 deficiency   BPH  Degenerative disc disease  Squamous cell carcinoma in situ   Hypertension  Hearing loss     Medications   Aspirin 81 mg   Lipitor  Antivert     Surgical History   Pacemaker and lead implant - right atrial and left ventricular   Cataract extraction x2  Physical Exam   Patient Vitals for the past 24 hrs:   BP Temp Pulse Resp SpO2 Height Weight   11/09/24 1151 130/78 -- 70 16 99 % -- --   11/09/24 0849 (!) 142/78 97.2  F (36.2  C) 68 14 98 % 1.854 m (6' 1\") 87.7 kg (193 lb 5.5 oz)     Physical Exam  Vitals and nursing note reviewed.   Constitutional:       General: He is not in acute distress.     Appearance: He is not ill-appearing.   HENT:      Head: " Normocephalic and atraumatic.      Comments: No temporal artery tenderness bilaterally     Right Ear: External ear normal.      Left Ear: External ear normal.      Nose: Nose normal.   Eyes:      Extraocular Movements: Extraocular movements intact.      Conjunctiva/sclera: Conjunctivae normal.      Pupils: Pupils are equal, round, and reactive to light.   Cardiovascular:      Rate and Rhythm: Normal rate and regular rhythm.      Heart sounds: No murmur heard.  Pulmonary:      Effort: Pulmonary effort is normal. No respiratory distress.      Breath sounds: No wheezing, rhonchi or rales.   Abdominal:      General: Abdomen is flat. There is no distension.      Palpations: Abdomen is soft.      Tenderness: There is no abdominal tenderness. There is no guarding or rebound.   Musculoskeletal:         General: No swelling or deformity.      Cervical back: Normal range of motion and neck supple. No rigidity.   Skin:     General: Skin is warm and dry.      Findings: No rash.   Neurological:      Mental Status: He is alert and oriented to person, place, and time.      Cranial Nerves: No cranial nerve deficit.      Sensory: No sensory deficit.      Motor: No weakness.   Psychiatric:         Behavior: Behavior normal.           Diagnostics   Lab Results   Labs Ordered and Resulted from Time of ED Arrival to Time of ED Departure   BASIC METABOLIC PANEL - Abnormal       Result Value    Sodium 137      Potassium 4.4      Chloride 104      Carbon Dioxide (CO2) 23      Anion Gap 10      Urea Nitrogen 20.9      Creatinine 1.11      GFR Estimate 64      Calcium 8.7 (*)     Glucose 126 (*)    CBC WITH PLATELETS AND DIFFERENTIAL - Abnormal    WBC Count 4.2      RBC Count 3.66 (*)     Hemoglobin 11.5 (*)     Hematocrit 35.2 (*)     MCV 96      MCH 31.4      MCHC 32.7      RDW 13.8      Platelet Count 123 (*)     % Neutrophils 62      % Lymphocytes 25      % Monocytes 11      % Eosinophils 1      % Basophils 0      % Immature Granulocytes  0      NRBCs per 100 WBC 0      Absolute Neutrophils 2.6      Absolute Lymphocytes 1.1      Absolute Monocytes 0.5      Absolute Eosinophils 0.1      Absolute Basophils 0.0      Absolute Immature Granulocytes 0.0      Absolute NRBCs 0.0     ERYTHROCYTE SEDIMENTATION RATE AUTO - Normal    Erythrocyte Sedimentation Rate 14     CRP INFLAMMATION - Normal    CRP Inflammation <3.00       Imaging  CT Head w/o Contrast   Final Result   IMPRESSION:   1.  No CT evidence for acute intracranial process.   2.  Brain atrophy and presumed chronic microvascular ischemic changes as above.        Report per radiology    Independent Interpretation  CT Head: No intracranial hemorrhage.  ED Course    Medications Administered  Medications   acetaminophen (TYLENOL) tablet 1,000 mg (1,000 mg Oral $Given 11/9/24 0932)     Procedures  Procedures     Discussion of Management  None    Additional Documentation  None    ED Course  ED Course as of 11/09/24 1335   Sat Nov 09, 2024   0922 I obtained history and examined the patient as noted above.    1141 I rechecked the patient and explained findings. I prepared the patient to be discharged home.      Medical Decision Making / Diagnosis   CMS Diagnoses: None    MIPS     None    MDM  89-year-old male presenting with a headache for the past few days.  Headache is nonspecific.  Does not sound like it was an acute thunderclap onset.  He has no neurologic deficits.  He did fall a few weeks ago so it is possible he could have subdural hematoma.  Also appears to have a distant history of migraines on chart review so it is possible that this could be primary headache syndrome.  He does not have fevers or any other infectious symptoms to suggest a CNS infection.  No sinus symptoms either.  He does not have any vision changes and no temporal artery tenderness so low suspicion for giant cell arteritis.  CT of the head was performed and did not show any acute findings.  Labs were also normal at baseline.  ESR  and CRP are also normal.  Patient had a dose of Tylenol and had improvement/resolution of his headache.  We will discharge we discussed return precautions and I recommended close follow-up with his primary care physician.    Disposition  The patient was discharged.     ICD-10 Codes:    ICD-10-CM    1. Acute nonintractable headache, unspecified headache type  R51.9       2. Chronic anemia  D64.9            Discharge Medications  Discharge Medication List as of 11/9/2024 11:52 AM        Scribe Disclosure:  I, Leona Mcclain, am serving as a scribe at 9:29 AM on 11/9/2024 to document services personally performed by Tino Mcginnis MD based on my observations and the provider's statements to me.      Tino Mcginnis MD  11/09/24 2056

## 2024-11-09 NOTE — ED TRIAGE NOTES
Patient reports a headache for the last 3 days. Denies vision changes. Patient reports falling 4 weeks ago and not being seen in the ED after this happened. Patient has not been taking his xarelto for the past several months. BEFAST negative.

## 2024-12-09 ENCOUNTER — ANCILLARY PROCEDURE (OUTPATIENT)
Dept: CARDIOLOGY | Facility: CLINIC | Age: 89
End: 2024-12-09
Attending: INTERNAL MEDICINE
Payer: COMMERCIAL

## 2024-12-09 DIAGNOSIS — Z95.0 CARDIAC PACEMAKER IN SITU: ICD-10-CM

## 2024-12-09 DIAGNOSIS — I44.1 SECOND DEGREE ATRIOVENTRICULAR BLOCK: ICD-10-CM

## 2024-12-09 DIAGNOSIS — I48.0 PAROXYSMAL ATRIAL FIBRILLATION (H): ICD-10-CM

## 2024-12-09 PROCEDURE — 93296 REM INTERROG EVL PM/IDS: CPT | Performed by: INTERNAL MEDICINE

## 2024-12-09 PROCEDURE — 93294 REM INTERROG EVL PM/LDLS PM: CPT | Performed by: INTERNAL MEDICINE

## 2024-12-12 LAB
MDC_IDC_EPISODE_DTM: NORMAL
MDC_IDC_EPISODE_DTM: NORMAL
MDC_IDC_EPISODE_ID: NORMAL
MDC_IDC_EPISODE_ID: NORMAL
MDC_IDC_EPISODE_TYPE: NORMAL
MDC_IDC_EPISODE_TYPE: NORMAL
MDC_IDC_LEAD_CONNECTION_STATUS: NORMAL
MDC_IDC_LEAD_CONNECTION_STATUS: NORMAL
MDC_IDC_LEAD_IMPLANT_DT: NORMAL
MDC_IDC_LEAD_IMPLANT_DT: NORMAL
MDC_IDC_LEAD_LOCATION: NORMAL
MDC_IDC_LEAD_LOCATION: NORMAL
MDC_IDC_LEAD_LOCATION_DETAIL_1: NORMAL
MDC_IDC_LEAD_LOCATION_DETAIL_1: NORMAL
MDC_IDC_LEAD_MFG: NORMAL
MDC_IDC_LEAD_MFG: NORMAL
MDC_IDC_LEAD_MODEL: NORMAL
MDC_IDC_LEAD_MODEL: NORMAL
MDC_IDC_LEAD_POLARITY_TYPE: NORMAL
MDC_IDC_LEAD_POLARITY_TYPE: NORMAL
MDC_IDC_LEAD_SERIAL: NORMAL
MDC_IDC_LEAD_SERIAL: NORMAL
MDC_IDC_MSMT_BATTERY_DTM: NORMAL
MDC_IDC_MSMT_BATTERY_REMAINING_LONGEVITY: 138 MO
MDC_IDC_MSMT_BATTERY_REMAINING_PERCENTAGE: 100 %
MDC_IDC_MSMT_BATTERY_STATUS: NORMAL
MDC_IDC_MSMT_LEADCHNL_RA_IMPEDANCE_VALUE: 608 OHM
MDC_IDC_MSMT_LEADCHNL_RA_PACING_THRESHOLD_AMPLITUDE: 0.4 V
MDC_IDC_MSMT_LEADCHNL_RA_PACING_THRESHOLD_PULSEWIDTH: 0.4 MS
MDC_IDC_MSMT_LEADCHNL_RV_IMPEDANCE_VALUE: 678 OHM
MDC_IDC_MSMT_LEADCHNL_RV_PACING_THRESHOLD_AMPLITUDE: 0.7 V
MDC_IDC_MSMT_LEADCHNL_RV_PACING_THRESHOLD_PULSEWIDTH: 0.4 MS
MDC_IDC_PG_IMPLANT_DTM: NORMAL
MDC_IDC_PG_MFG: NORMAL
MDC_IDC_PG_MODEL: NORMAL
MDC_IDC_PG_SERIAL: NORMAL
MDC_IDC_PG_TYPE: NORMAL
MDC_IDC_SESS_CLINIC_NAME: NORMAL
MDC_IDC_SESS_DTM: NORMAL
MDC_IDC_SESS_TYPE: NORMAL
MDC_IDC_SET_BRADY_AT_MODE_SWITCH_MODE: NORMAL
MDC_IDC_SET_BRADY_AT_MODE_SWITCH_RATE: 170 {BEATS}/MIN
MDC_IDC_SET_BRADY_LOWRATE: 60 {BEATS}/MIN
MDC_IDC_SET_BRADY_MAX_SENSOR_RATE: 130 {BEATS}/MIN
MDC_IDC_SET_BRADY_MAX_TRACKING_RATE: 130 {BEATS}/MIN
MDC_IDC_SET_BRADY_MODE: NORMAL
MDC_IDC_SET_BRADY_PAV_DELAY_HIGH: 150 MS
MDC_IDC_SET_BRADY_PAV_DELAY_LOW: 200 MS
MDC_IDC_SET_BRADY_SAV_DELAY_HIGH: 150 MS
MDC_IDC_SET_BRADY_SAV_DELAY_LOW: 200 MS
MDC_IDC_SET_LEADCHNL_RA_PACING_AMPLITUDE: 2 V
MDC_IDC_SET_LEADCHNL_RA_PACING_CAPTURE_MODE: NORMAL
MDC_IDC_SET_LEADCHNL_RA_PACING_POLARITY: NORMAL
MDC_IDC_SET_LEADCHNL_RA_PACING_PULSEWIDTH: 0.4 MS
MDC_IDC_SET_LEADCHNL_RA_SENSING_ADAPTATION_MODE: NORMAL
MDC_IDC_SET_LEADCHNL_RA_SENSING_POLARITY: NORMAL
MDC_IDC_SET_LEADCHNL_RA_SENSING_SENSITIVITY: 0.25 MV
MDC_IDC_SET_LEADCHNL_RV_PACING_AMPLITUDE: 1.2 V
MDC_IDC_SET_LEADCHNL_RV_PACING_CAPTURE_MODE: NORMAL
MDC_IDC_SET_LEADCHNL_RV_PACING_POLARITY: NORMAL
MDC_IDC_SET_LEADCHNL_RV_PACING_PULSEWIDTH: 0.4 MS
MDC_IDC_SET_LEADCHNL_RV_SENSING_ADAPTATION_MODE: NORMAL
MDC_IDC_SET_LEADCHNL_RV_SENSING_POLARITY: NORMAL
MDC_IDC_SET_LEADCHNL_RV_SENSING_SENSITIVITY: 1.5 MV
MDC_IDC_SET_ZONE_DETECTION_INTERVAL: 375 MS
MDC_IDC_SET_ZONE_STATUS: NORMAL
MDC_IDC_SET_ZONE_TYPE: NORMAL
MDC_IDC_SET_ZONE_VENDOR_TYPE: NORMAL
MDC_IDC_STAT_AT_BURDEN_PERCENT: 0 %
MDC_IDC_STAT_AT_DTM_END: NORMAL
MDC_IDC_STAT_AT_DTM_START: NORMAL
MDC_IDC_STAT_BRADY_DTM_END: NORMAL
MDC_IDC_STAT_BRADY_DTM_START: NORMAL
MDC_IDC_STAT_BRADY_RA_PERCENT_PACED: 82 %
MDC_IDC_STAT_BRADY_RV_PERCENT_PACED: 99 %
MDC_IDC_STAT_EPISODE_RECENT_COUNT: 0
MDC_IDC_STAT_EPISODE_RECENT_COUNT_DTM_END: NORMAL
MDC_IDC_STAT_EPISODE_RECENT_COUNT_DTM_START: NORMAL
MDC_IDC_STAT_EPISODE_TYPE: NORMAL
MDC_IDC_STAT_EPISODE_VENDOR_TYPE: NORMAL
MDC_IDC_STAT_EPISODE_VENDOR_TYPE: NORMAL

## 2025-03-11 ENCOUNTER — HOSPITAL ENCOUNTER (OUTPATIENT)
Facility: CLINIC | Age: 89
Setting detail: OBSERVATION
Discharge: HOME | End: 2025-03-12
Attending: INTERNAL MEDICINE | Admitting: STUDENT IN AN ORGANIZED HEALTH CARE EDUCATION/TRAINING PROGRAM
Payer: COMMERCIAL

## 2025-03-11 ENCOUNTER — APPOINTMENT (OUTPATIENT)
Dept: GENERAL RADIOLOGY | Facility: CLINIC | Age: 89
End: 2025-03-11
Attending: EMERGENCY MEDICINE
Payer: COMMERCIAL

## 2025-03-11 ENCOUNTER — TELEPHONE (OUTPATIENT)
Dept: CARDIOLOGY | Facility: CLINIC | Age: 89
End: 2025-03-11

## 2025-03-11 ENCOUNTER — HOSPITAL ENCOUNTER (EMERGENCY)
Facility: CLINIC | Age: 89
Discharge: ANOTHER HOSPITAL | End: 2025-03-11
Attending: EMERGENCY MEDICINE | Admitting: EMERGENCY MEDICINE
Payer: COMMERCIAL

## 2025-03-11 VITALS
HEIGHT: 73 IN | BODY MASS INDEX: 25.3 KG/M2 | WEIGHT: 190.92 LBS | TEMPERATURE: 97.5 F | OXYGEN SATURATION: 99 % | RESPIRATION RATE: 18 BRPM | DIASTOLIC BLOOD PRESSURE: 73 MMHG | SYSTOLIC BLOOD PRESSURE: 159 MMHG | HEART RATE: 62 BPM

## 2025-03-11 DIAGNOSIS — R26.81 UNSTEADY GAIT: ICD-10-CM

## 2025-03-11 DIAGNOSIS — R79.89 ELEVATED TROPONIN: ICD-10-CM

## 2025-03-11 DIAGNOSIS — R26.9 IMPAIRED GAIT: Primary | ICD-10-CM

## 2025-03-11 DIAGNOSIS — R42 DIZZINESS: ICD-10-CM

## 2025-03-11 LAB
ALBUMIN UR-MCNC: NEGATIVE MG/DL
ANION GAP SERPL CALCULATED.3IONS-SCNC: 9 MMOL/L (ref 7–15)
APPEARANCE UR: CLEAR
ATRIAL RATE - MUSE: 62 BPM
BASOPHILS # BLD AUTO: 0 10E3/UL (ref 0–0.2)
BASOPHILS NFR BLD AUTO: 0 %
BILIRUB UR QL STRIP: NEGATIVE
BUN SERPL-MCNC: 25 MG/DL (ref 8–23)
CALCIUM SERPL-MCNC: 9.1 MG/DL (ref 8.8–10.4)
CHLORIDE SERPL-SCNC: 106 MMOL/L (ref 98–107)
COLOR UR AUTO: NORMAL
CREAT SERPL-MCNC: 1.07 MG/DL (ref 0.67–1.17)
DIASTOLIC BLOOD PRESSURE - MUSE: NORMAL MMHG
EGFRCR SERPLBLD CKD-EPI 2021: 67 ML/MIN/1.73M2
EOSINOPHIL # BLD AUTO: 0 10E3/UL (ref 0–0.7)
EOSINOPHIL NFR BLD AUTO: 1 %
ERYTHROCYTE [DISTWIDTH] IN BLOOD BY AUTOMATED COUNT: 14.2 % (ref 10–15)
GLUCOSE SERPL-MCNC: 99 MG/DL (ref 70–99)
GLUCOSE UR STRIP-MCNC: NEGATIVE MG/DL
HCO3 SERPL-SCNC: 26 MMOL/L (ref 22–29)
HCT VFR BLD AUTO: 37.3 % (ref 40–53)
HGB BLD-MCNC: 12.1 G/DL (ref 13.3–17.7)
HGB UR QL STRIP: NEGATIVE
HOLD SPECIMEN: NORMAL
IMM GRANULOCYTES # BLD: 0 10E3/UL
IMM GRANULOCYTES NFR BLD: 0 %
INTERPRETATION ECG - MUSE: NORMAL
KETONES UR STRIP-MCNC: NEGATIVE MG/DL
LEUKOCYTE ESTERASE UR QL STRIP: NEGATIVE
LYMPHOCYTES # BLD AUTO: 1.1 10E3/UL (ref 0.8–5.3)
LYMPHOCYTES NFR BLD AUTO: 29 %
MCH RBC QN AUTO: 31.3 PG (ref 26.5–33)
MCHC RBC AUTO-ENTMCNC: 32.4 G/DL (ref 31.5–36.5)
MCV RBC AUTO: 96 FL (ref 78–100)
MONOCYTES # BLD AUTO: 0.3 10E3/UL (ref 0–1.3)
MONOCYTES NFR BLD AUTO: 9 %
NEUTROPHILS # BLD AUTO: 2.3 10E3/UL (ref 1.6–8.3)
NEUTROPHILS NFR BLD AUTO: 61 %
NITRATE UR QL: NEGATIVE
NRBC # BLD AUTO: 0 10E3/UL
NRBC BLD AUTO-RTO: 0 /100
P AXIS - MUSE: NORMAL DEGREES
PH UR STRIP: 5.5 [PH] (ref 5–7)
PLATELET # BLD AUTO: 125 10E3/UL (ref 150–450)
POTASSIUM SERPL-SCNC: 4.6 MMOL/L (ref 3.4–5.3)
PR INTERVAL - MUSE: NORMAL MS
QRS DURATION - MUSE: 160 MS
QT - MUSE: 470 MS
QTC - MUSE: 477 MS
R AXIS - MUSE: -84 DEGREES
RBC # BLD AUTO: 3.87 10E6/UL (ref 4.4–5.9)
RBC URINE: 1 /HPF
SODIUM SERPL-SCNC: 141 MMOL/L (ref 135–145)
SP GR UR STRIP: 1.02 (ref 1–1.03)
SYSTOLIC BLOOD PRESSURE - MUSE: NORMAL MMHG
T AXIS - MUSE: 76 DEGREES
TROPONIN T SERPL HS-MCNC: 21 NG/L
TROPONIN T SERPL HS-MCNC: 24 NG/L
UROBILINOGEN UR STRIP-MCNC: NORMAL MG/DL
VENTRICULAR RATE- MUSE: 62 BPM
WBC # BLD AUTO: 3.8 10E3/UL (ref 4–11)
WBC URINE: <1 /HPF

## 2025-03-11 PROCEDURE — 84484 ASSAY OF TROPONIN QUANT: CPT | Performed by: EMERGENCY MEDICINE

## 2025-03-11 PROCEDURE — 85004 AUTOMATED DIFF WBC COUNT: CPT | Performed by: SOCIAL WORKER

## 2025-03-11 PROCEDURE — 81003 URINALYSIS AUTO W/O SCOPE: CPT | Performed by: EMERGENCY MEDICINE

## 2025-03-11 PROCEDURE — G0378 HOSPITAL OBSERVATION PER HR: HCPCS

## 2025-03-11 PROCEDURE — 71046 X-RAY EXAM CHEST 2 VIEWS: CPT

## 2025-03-11 PROCEDURE — 85025 COMPLETE CBC W/AUTO DIFF WBC: CPT | Performed by: EMERGENCY MEDICINE

## 2025-03-11 PROCEDURE — 93005 ELECTROCARDIOGRAM TRACING: CPT

## 2025-03-11 PROCEDURE — 258N000003 HC RX IP 258 OP 636: Performed by: EMERGENCY MEDICINE

## 2025-03-11 PROCEDURE — 80048 BASIC METABOLIC PNL TOTAL CA: CPT | Performed by: EMERGENCY MEDICINE

## 2025-03-11 PROCEDURE — 85014 HEMATOCRIT: CPT | Performed by: SOCIAL WORKER

## 2025-03-11 PROCEDURE — 99223 1ST HOSP IP/OBS HIGH 75: CPT | Performed by: STUDENT IN AN ORGANIZED HEALTH CARE EDUCATION/TRAINING PROGRAM

## 2025-03-11 PROCEDURE — 36415 COLL VENOUS BLD VENIPUNCTURE: CPT | Performed by: EMERGENCY MEDICINE

## 2025-03-11 PROCEDURE — 99285 EMERGENCY DEPT VISIT HI MDM: CPT | Mod: 25

## 2025-03-11 PROCEDURE — G0379 DIRECT REFER HOSPITAL OBSERV: HCPCS

## 2025-03-11 PROCEDURE — 96360 HYDRATION IV INFUSION INIT: CPT

## 2025-03-11 PROCEDURE — 36415 COLL VENOUS BLD VENIPUNCTURE: CPT | Performed by: SOCIAL WORKER

## 2025-03-11 RX ORDER — ACETAMINOPHEN 325 MG/1
650 TABLET ORAL EVERY 4 HOURS PRN
Status: DISCONTINUED | OUTPATIENT
Start: 2025-03-11 | End: 2025-03-12 | Stop reason: HOSPADM

## 2025-03-11 RX ORDER — ONDANSETRON 2 MG/ML
4 INJECTION INTRAMUSCULAR; INTRAVENOUS EVERY 6 HOURS PRN
Status: DISCONTINUED | OUTPATIENT
Start: 2025-03-11 | End: 2025-03-12 | Stop reason: HOSPADM

## 2025-03-11 RX ORDER — HYDRALAZINE HYDROCHLORIDE 20 MG/ML
10 INJECTION INTRAMUSCULAR; INTRAVENOUS EVERY 4 HOURS PRN
Status: DISCONTINUED | OUTPATIENT
Start: 2025-03-11 | End: 2025-03-12 | Stop reason: HOSPADM

## 2025-03-11 RX ORDER — AMOXICILLIN 250 MG
1 CAPSULE ORAL 2 TIMES DAILY PRN
Status: DISCONTINUED | OUTPATIENT
Start: 2025-03-11 | End: 2025-03-12 | Stop reason: HOSPADM

## 2025-03-11 RX ORDER — PROCHLORPERAZINE MALEATE 5 MG/1
5 TABLET ORAL EVERY 6 HOURS PRN
Status: DISCONTINUED | OUTPATIENT
Start: 2025-03-11 | End: 2025-03-12 | Stop reason: HOSPADM

## 2025-03-11 RX ORDER — ACETAMINOPHEN 650 MG/1
650 SUPPOSITORY RECTAL EVERY 4 HOURS PRN
Status: DISCONTINUED | OUTPATIENT
Start: 2025-03-11 | End: 2025-03-12 | Stop reason: HOSPADM

## 2025-03-11 RX ORDER — ONDANSETRON 4 MG/1
4 TABLET, ORALLY DISINTEGRATING ORAL EVERY 6 HOURS PRN
Status: DISCONTINUED | OUTPATIENT
Start: 2025-03-11 | End: 2025-03-12 | Stop reason: HOSPADM

## 2025-03-11 RX ORDER — AMOXICILLIN 250 MG
2 CAPSULE ORAL 2 TIMES DAILY PRN
Status: DISCONTINUED | OUTPATIENT
Start: 2025-03-11 | End: 2025-03-12 | Stop reason: HOSPADM

## 2025-03-11 RX ORDER — HYDRALAZINE HYDROCHLORIDE 10 MG/1
10 TABLET, FILM COATED ORAL EVERY 4 HOURS PRN
Status: DISCONTINUED | OUTPATIENT
Start: 2025-03-11 | End: 2025-03-12 | Stop reason: HOSPADM

## 2025-03-11 RX ADMIN — SODIUM CHLORIDE 500 ML: 0.9 INJECTION, SOLUTION INTRAVENOUS at 15:03

## 2025-03-11 ASSESSMENT — ACTIVITIES OF DAILY LIVING (ADL)
ADLS_ACUITY_SCORE: 55

## 2025-03-11 ASSESSMENT — COLUMBIA-SUICIDE SEVERITY RATING SCALE - C-SSRS
2. HAVE YOU ACTUALLY HAD ANY THOUGHTS OF KILLING YOURSELF IN THE PAST MONTH?: NO
6. HAVE YOU EVER DONE ANYTHING, STARTED TO DO ANYTHING, OR PREPARED TO DO ANYTHING TO END YOUR LIFE?: NO
1. IN THE PAST MONTH, HAVE YOU WISHED YOU WERE DEAD OR WISHED YOU COULD GO TO SLEEP AND NOT WAKE UP?: NO

## 2025-03-11 NOTE — ED PROVIDER NOTES
"    History     Chief Complaint:  Dizziness       HPI   Benjamin Elizabeth is a 89 year old male very active golfs bowls goes to the gym.  Yesterday normal day.  Today upon awakening dizziness non vertigo both while standing and with walking unable imbalanced.  Has hx fo pacer choice to not take blood thinners.  Some blurred at time reading the paper.  He does not usually walk with a walker or cane.  No prodromal HA neck pain CP SOB cough fever abd pain NVD dysuria fevers.  No focal weakness or sensation changes.        Independent Historian:    Wife    Review of External Notes:  12/2024 generalized weakness both legs PT  ER 11/2024 for HA Ct head and workup not acute  Medications:    aspirin 81 MG EC tablet  multivitamin, therapeutic (THERA-VIT) TABS tablet        Past Medical History:    No past medical history on file.    Past Surgical History:    Past Surgical History:   Procedure Laterality Date    EP PACEMAKER DEVICE & LEAD IMPLANT- RIGHT ATRIAL & LEFT VENTRICULAR N/A 9/22/2022    Procedure: Pacemaker Device & Lead Implant- Right Atrial & Left Ventricular;  Surgeon: Blaze Cain MD;  Location: [facility] CARDIAC CATH LAB          Physical Exam   Patient Vitals for the past 24 hrs:   BP Temp Temp src Pulse Resp SpO2 Height Weight   03/11/25 2114 (!) 159/73 -- -- 62 -- 99 % -- --   03/11/25 2034 (!) 158/74 -- -- 61 -- 100 % -- --   03/11/25 2015 (!) 156/72 -- -- 62 18 99 % -- --   03/11/25 1930 (!) 159/72 -- -- 60 18 99 % -- --   03/11/25 1915 (!) 151/74 -- -- 60 18 99 % -- --   03/11/25 1900 (!) 155/67 -- -- 60 18 99 % -- --   03/11/25 1300 (!) 167/77 97.5  F (36.4  C) Oral 63 18 97 % 1.854 m (6' 1\") 86.6 kg (190 lb 14.7 oz)        Physical Exam  General: Patient is well appearing. No distress.  Head: Atraumatic.  Eyes: Conjunctivae and EOM are normal. No scleral icterus.  Neck: Normal range of motion. Neck supple.   Cardiovascular: Normal rate, regular rhythm, normal heart sounds and intact distal " pulses.   Pulmonary/Chest: Breath sounds normal. No respiratory distress.  Abdominal: Soft. Bowel sounds are normal. No distension. No tenderness. No rebound or guarding.   Musculoskeletal: Normal range of motion.  Neuro: Alert, oriented x3, PERRL, EOMI, CN 2-7 and 9-12 intact, 5/5 grasp BUE, 5/5 elbow flexion and extension BUE, 5/5 shoulder abduction BUE, 5/5 hip flexion, knee flexion, knee extension, plantar and dorsiflexion BLE, no pronator drift.  In tirage can only test gait and needs walker otherwise very unstable.    Skin: Warm and dry. No rash noted. Not diaphoretic.      Emergency Department Course   ECG  AV paced rhythm HR 62    Imaging:  XR Chest 2 Views   Final Result   IMPRESSION: A left chest wall implanted cardiac device and dual cardiac leads are seen in similar expected position with the leads terminating in the region of the RA and RV. Heart size and pulmonary vasculature are normal appearing. Very minimal    blunting of the left costophrenic angle, which may be secondary to pleural thickening, scarring, and/or trace pleural fluid. No signs of pneumonia, pneumothorax, or significant pleural fluid. Hypertrophic degenerative changes of the spine. No acute    osseous abnormality.          Laboratory:  Labs Ordered and Resulted from Time of ED Arrival to Time of ED Departure   BASIC METABOLIC PANEL - Abnormal       Result Value    Sodium 141      Potassium 4.6      Chloride 106      Carbon Dioxide (CO2) 26      Anion Gap 9      Urea Nitrogen 25.0 (*)     Creatinine 1.07      GFR Estimate 67      Calcium 9.1      Glucose 99     CBC WITH PLATELETS AND DIFFERENTIAL - Abnormal    WBC Count 3.8 (*)     RBC Count 3.87 (*)     Hemoglobin 12.1 (*)     Hematocrit 37.3 (*)     MCV 96      MCH 31.3      MCHC 32.4      RDW 14.2      Platelet Count 125 (*)     % Neutrophils 61      % Lymphocytes 29      % Monocytes 9      % Eosinophils 1      % Basophils 0      % Immature Granulocytes 0      NRBCs per 100 WBC 0       Absolute Neutrophils 2.3      Absolute Lymphocytes 1.1      Absolute Monocytes 0.3      Absolute Eosinophils 0.0      Absolute Basophils 0.0      Absolute Immature Granulocytes 0.0      Absolute NRBCs 0.0     TROPONIN T, HIGH SENSITIVITY - Abnormal    Troponin T, High Sensitivity 24 (*)    ROUTINE UA WITH MICROSCOPIC REFLEX TO CULTURE - Normal    Color Urine Light Yellow      Appearance Urine Clear      Glucose Urine Negative      Bilirubin Urine Negative      Ketones Urine Negative      Specific Gravity Urine 1.019      Blood Urine Negative      pH Urine 5.5      Protein Albumin Urine Negative      Urobilinogen Urine Normal      Nitrite Urine Negative      Leukocyte Esterase Urine Negative      RBC Urine 1      WBC Urine <1     TROPONIN T, HIGH SENSITIVITY - Normal    Troponin T, High Sensitivity 21        Chronic anemia and thrombocytopenia reviewed essentially unchanged    Procedures       Emergency Department Course & Assessments:    Interventions:  Medications   sodium chloride 0.9% BOLUS 500 mL (0 mLs Intravenous Stopped 3/11/25 1618)        Assessments:  He had no further deterioration here.  Was still ambulatory to  with walker.      Independent Interpretation (X-rays, CTs, rhythm strip):  CXR no large effusions infiltrate PTX or masses.  Pacer appears  intact.      Consultations/Discussion of Management or Tests:  Hospitalist triage center going to [facility].         Social Drivers of Health affecting care:       Disposition:  Transfer [facility]    Impression & Plan           Medical Decision Making:  Pt has vagues postural and walking instability of gate and dizziness.  This not pure vertigo but concern considering CV risk age and choosing not to take anticoag.  EKG shows paced rhythm.  CBC and diff uncganed from previous.  BMP acceptable.  Slight elev trop.  No CP or SOB.  No focal stroke scale findings other than nebulous gait and dizziness.  Transfer to [facility] for MRI and further  workup and mgmt.      Diagnosis:    ICD-10-CM    1. Dizziness  R42       2. Unsteady gait  R26.81       3. Elevated troponin  R79.89            Discharge Medications:  Discharge Medication List as of 3/11/2025  9:46 PM               3/11/2025   Jordin Antonio MD Stevens, Andrew C, MD  03/11/25 1490

## 2025-03-11 NOTE — ED NOTES
Bed: ED11  Expected date: 3/11/25  Expected time: 6:07 PM  Means of arrival:   Comments:  RP02 when clean

## 2025-03-11 NOTE — TELEPHONE ENCOUNTER
Is the implanted device safe for MRI Exam? Yes  Is this device 3T compatible? Yes  Device Type: Pacemaker      Device Information: [employer], Dual Chamber  L331 [facility] MRI       Cardiology Orders for Device Programming     -- Yes -- The patient has a MRI conditional pulse generator and leads from the same     -- Yes -- The pulse generator and leads have been implanted for at least 6 weeks. (Does not apply to [employer] devices)    -- Yes-- The device is implanted in the left pectoral region    -- No -- There are not any additional active cardiac devices, abandoned leads, lead extenders or adapters    -- Yes -- Lead impedances are within the normal range per  guidelines.    -- N/A -- If the patient is pacemaker dependent the thresholds are less than or equal to 4.0 V @ 1.0 ms    -- Yes -- RA and RV leads are programmed to bipolar pacing operation or pacing off. If no, MRI TO CONTACT THE DEVICE REP FOR PROGRAMMING. (Sensing can be bipolar or unipolar).     -- N/A -- LV lead programmed to bipolar pacing operation or pacing off. If no, CONTACT THE DEVICE REP TO DETERMINE IF REPROGRAMMING WILL NEED TO BE COMPLETED DAY OF PROCEDURE.       Date of last Remote Device check: 12/9/25  Results of last Device check:  1.   Right atrium impedance: 608  2.   Right ventricle impedance: 678  3.   Left ventricle impedance: NA     4.   Right atrium threshold: 0.4 V @ 0.4 ms  5.   Right ventricle threshold: 0.7 V @ 0.4 ms  6.   Left ventricle threshold: NA     Device programming during the scan guidelines   Pacing Mode (check one): DOO  Pacing Rate: 70  bpm or   If remote reprogramming is applicable, please contact the Rep to assist

## 2025-03-11 NOTE — CARE PLAN
"Transfer Type: [facility]  Transfer Triage Note    Date of call: 03/11/25  Time of call: 3:54 PM    Current Patient Location:  [facility]  Current Level of Care: ED    Vitals: Temp: 97.5  F (36.4  C) Temp src: Oral   Pulse: 63   Resp: 18 SpO2: 97 % Height: 185.4 cm (6' 1\") Weight: 86.6 kg (190 lb 14.7 oz)    at    Diagnosis: Dizziness, Concern for posterior CVA  Reason for requested transfer: Further diagnostic work up, management, and consultation for specialized care   Isolation Needs: None    Care everywhere has been updated and reviewed: Yes  Necessary images have been sent through PACS: Yes    If patient is transferring for specialty care or specific procedure, the specialist required has participated in the transfer call and agreed with need for transfer and anticipated timeline: No    Transfer accepted: Yes  Stability of Patient: Patient is vitally stable, with no critical labs, and will likely remain stable throughout the transfer process  Level of Care Needed: Med Surg  Telemetry Needed:  Med (Remote) Telemetry  Expected Time of Arrival for Transfer: 8-24 hours  Arrival Location:  [facility]     Recommendations for Management and Stabilization: Not needed    Additional Comments:     89 yo M with pmh of PPM, not currently on anticoagulation who presented with dizziness and concerns for CVA. He is also notably weak and cannot care for himself. Local ER cannot complete MRI due to pacemaker. Requesting transfer to facility capable of MRI with pacemaker to rule out posterior CVA.     Patient has NIHSS of 0 and no focal exam findings. Given instability, ER would plan for at minimum observation admission with MRI, PT/OT and potential consultation (neuro/cards) based on MRI findings.     Patient accepted to [facility] Med-Surg.     To notify the admitting provider that the patient has arrived at their destination:    For [facility]: Identify the correct provider on Amcom: Select " HOSPITALIST SERVICE / CHARLIE and then find the provider who has the title CAPTAIN: DIRECT ADMITS or CAPTAIN: ER / DIRECT ADMIT / PT PLACEMENT next to their name. Use [employer] to contact that person.      Best Pryor MD

## 2025-03-11 NOTE — ED TRIAGE NOTES
Patient reports dizziness that started this morning around 7am. Patient states the dizziness is making him unsteady on his feet. Patient is using a walker today but states he doesn't normally need one.      Triage Assessment (Adult)       Row Name 03/11/25 1256          Triage Assessment    Airway WDL WDL        Respiratory WDL    Respiratory WDL WDL        Skin Circulation/Temperature WDL    Skin Circulation/Temperature WDL WDL        Cardiac WDL    Cardiac WDL WDL        Peripheral/Neurovascular WDL    Peripheral Neurovascular WDL WDL        Cognitive/Neuro/Behavioral WDL    Cognitive/Neuro/Behavioral WDL WDL

## 2025-03-12 ENCOUNTER — DOCUMENTATION ONLY (OUTPATIENT)
Dept: CARDIOLOGY | Facility: CLINIC | Age: 89
End: 2025-03-12

## 2025-03-12 VITALS
WEIGHT: 183.86 LBS | SYSTOLIC BLOOD PRESSURE: 126 MMHG | HEART RATE: 61 BPM | BODY MASS INDEX: 24.26 KG/M2 | DIASTOLIC BLOOD PRESSURE: 57 MMHG | OXYGEN SATURATION: 97 % | TEMPERATURE: 97.7 F | RESPIRATION RATE: 17 BRPM

## 2025-03-12 LAB
ALBUMIN SERPL BCG-MCNC: 3.3 G/DL (ref 3.5–5.2)
ALP SERPL-CCNC: 55 U/L (ref 40–150)
ALT SERPL W P-5'-P-CCNC: 14 U/L (ref 0–70)
ANION GAP SERPL CALCULATED.3IONS-SCNC: 7 MMOL/L (ref 7–15)
AST SERPL W P-5'-P-CCNC: 20 U/L (ref 0–45)
BILIRUB SERPL-MCNC: 0.5 MG/DL
BUN SERPL-MCNC: 22.3 MG/DL (ref 8–23)
CALCIUM SERPL-MCNC: 8.5 MG/DL (ref 8.8–10.4)
CHLORIDE SERPL-SCNC: 109 MMOL/L (ref 98–107)
CREAT SERPL-MCNC: 0.96 MG/DL (ref 0.67–1.17)
EGFRCR SERPLBLD CKD-EPI 2021: 76 ML/MIN/1.73M2
ERYTHROCYTE [DISTWIDTH] IN BLOOD BY AUTOMATED COUNT: 13.8 % (ref 10–15)
EST. AVERAGE GLUCOSE BLD GHB EST-MCNC: 105 MG/DL
GLUCOSE SERPL-MCNC: 79 MG/DL (ref 70–99)
HBA1C MFR BLD: 5.3 %
HCO3 SERPL-SCNC: 25 MMOL/L (ref 22–29)
HCT VFR BLD AUTO: 35.8 % (ref 40–53)
HGB BLD-MCNC: 11.6 G/DL (ref 13.3–17.7)
MCH RBC QN AUTO: 31.4 PG (ref 26.5–33)
MCHC RBC AUTO-ENTMCNC: 32.4 G/DL (ref 31.5–36.5)
MCV RBC AUTO: 97 FL (ref 78–100)
PLATELET # BLD AUTO: 125 10E3/UL (ref 150–450)
POTASSIUM SERPL-SCNC: 4.5 MMOL/L (ref 3.4–5.3)
PROT SERPL-MCNC: 5.6 G/DL (ref 6.4–8.3)
RBC # BLD AUTO: 3.7 10E6/UL (ref 4.4–5.9)
SODIUM SERPL-SCNC: 141 MMOL/L (ref 135–145)
WBC # BLD AUTO: 3.6 10E3/UL (ref 4–11)

## 2025-03-12 PROCEDURE — 99223 1ST HOSP IP/OBS HIGH 75: CPT | Performed by: STUDENT IN AN ORGANIZED HEALTH CARE EDUCATION/TRAINING PROGRAM

## 2025-03-12 PROCEDURE — 82247 BILIRUBIN TOTAL: CPT | Performed by: STUDENT IN AN ORGANIZED HEALTH CARE EDUCATION/TRAINING PROGRAM

## 2025-03-12 PROCEDURE — G0378 HOSPITAL OBSERVATION PER HR: HCPCS

## 2025-03-12 PROCEDURE — 36415 COLL VENOUS BLD VENIPUNCTURE: CPT | Performed by: STUDENT IN AN ORGANIZED HEALTH CARE EDUCATION/TRAINING PROGRAM

## 2025-03-12 PROCEDURE — 99239 HOSP IP/OBS DSCHRG MGMT >30: CPT | Performed by: HOSPITALIST

## 2025-03-12 PROCEDURE — 82040 ASSAY OF SERUM ALBUMIN: CPT | Performed by: STUDENT IN AN ORGANIZED HEALTH CARE EDUCATION/TRAINING PROGRAM

## 2025-03-12 PROCEDURE — 97116 GAIT TRAINING THERAPY: CPT | Mod: GP

## 2025-03-12 PROCEDURE — A9585 GADOBUTROL INJECTION: HCPCS | Performed by: STUDENT IN AN ORGANIZED HEALTH CARE EDUCATION/TRAINING PROGRAM

## 2025-03-12 PROCEDURE — 97161 PT EVAL LOW COMPLEX 20 MIN: CPT | Mod: GP

## 2025-03-12 PROCEDURE — 99207 PR NO BILLABLE SERVICE THIS VISIT: CPT | Performed by: HOSPITALIST

## 2025-03-12 PROCEDURE — 97530 THERAPEUTIC ACTIVITIES: CPT | Mod: GP

## 2025-03-12 PROCEDURE — 255N000002 HC RX 255 OP 636: Performed by: STUDENT IN AN ORGANIZED HEALTH CARE EDUCATION/TRAINING PROGRAM

## 2025-03-12 PROCEDURE — 82435 ASSAY OF BLOOD CHLORIDE: CPT | Performed by: STUDENT IN AN ORGANIZED HEALTH CARE EDUCATION/TRAINING PROGRAM

## 2025-03-12 PROCEDURE — 83036 HEMOGLOBIN GLYCOSYLATED A1C: CPT | Performed by: STUDENT IN AN ORGANIZED HEALTH CARE EDUCATION/TRAINING PROGRAM

## 2025-03-12 PROCEDURE — 85027 COMPLETE CBC AUTOMATED: CPT | Performed by: STUDENT IN AN ORGANIZED HEALTH CARE EDUCATION/TRAINING PROGRAM

## 2025-03-12 RX ORDER — LANOLIN ALCOHOL/MO/W.PET/CERES
1000 CREAM (GRAM) TOPICAL DAILY
COMMUNITY

## 2025-03-12 RX ORDER — GADOBUTROL 604.72 MG/ML
8 INJECTION INTRAVENOUS ONCE
Status: COMPLETED | OUTPATIENT
Start: 2025-03-12 | End: 2025-03-12

## 2025-03-12 RX ADMIN — GADOBUTROL 8 ML: 604.72 INJECTION INTRAVENOUS at 08:56

## 2025-03-12 ASSESSMENT — ACTIVITIES OF DAILY LIVING (ADL)
ADLS_ACUITY_SCORE: 57

## 2025-03-12 NOTE — PROGRESS NOTES
03/12/25 1350   Appointment Info   Signing Clinician's Name / Credentials (PT) Deyanira Vinson, PT, DPT   Quick Adds   Quick Adds Certification;Vestibular Eval   Living Environment   People in Home spouse   Current Living Arrangements house   Home Accessibility stairs within home   Number of Stairs, Within Home, Primary greater than 10 stairs   Stair Railings, Within Home, Primary railings safe and in good condition;railing on left side (ascending)   Transportation Anticipated car, drives self   Living Environment Comments Patient lives in a two story house with his wife. Bedroom is on the second level. Office is in the basement. He uses a tub/shower with grab bars.   Self-Care   Usual Activity Tolerance good   Current Activity Tolerance good   Regular Exercise Yes   Activity/Exercise Type strength training;walking   Exercise Amount/Frequency 3-5 times/wk   Equipment Currently Used at Home cane, straight;walker, rolling;grab bar, tub/shower   Fall history within last six months no   Activity/Exercise/Self-Care Comment Patient reports he goes to the [facility] to exercise 4 times per week. He is independent with mobility and ADLs. He drives. Uses a FWW when he frist gets up in the morning then progresses to useing a cane   General Information   Onset of Illness/Injury or Date of Surgery 03/11/25   Referring Physician Shira Julian MD   Patient/Family Therapy Goals Statement (PT) to go home   Pertinent History of Current Problem (include personal factors and/or comorbidities that impact the POC) Patient is 89 YO M who presented to hospital with recurrent dizziness. PMH includes CVA, HFrEF, AV block s/p PPM, HTN, CAD, A-fib not on anticoagulation.   Existing Precautions/Restrictions fall   General Observations Patient sitting up in chair, agreeable to PT. [place].   Cognition   Affect/Mental Status (Cognition) WFL   Orientation Status (Cognition) oriented x 4   Follows Commands (Cognition) WFL   Pain Assessment   Patient  Currently in Pain No   Integumentary/Edema   Integumentary/Edema Comments Chronic edema on B LEs, +2 pitting in B LEs, intermittent use of compression stockings at home   Posture    Posture Forward head position   Range of Motion (ROM)   Range of Motion ROM is WFL   Strength (Manual Muscle Testing)   Strength (Manual Muscle Testing) strength is WFL   Bed Mobility   Bed Mobility no deficits identified   Comment, (Bed Mobility) Independent supine <> sit, some difficulty lifting legs into bed but able to complete withotu assist   Transfers   Comment, (Transfers) Sit <> stand with FWW and SBA   Gait/Stairs (Locomotion)   [place] Level (Gait) supervision   Assistive Device (Gait) walker, front-wheeled   Distance in Feet (Gait) 25'   Pattern (Gait) step-to;step-through   Comment, (Gait/Stairs) Patient ambulates with step through gait with intermittent shuffling steps, decreased step length overall, forward flexed posture and decreased herlinda.   Balance   Balance Comments Needs support of assistive device for balance when ambulating; Good dynamic sitting balance with donning shoes/adjusting socks   Sensory Examination   Sensory Perception Comments Patietn denies numbness/tingling in extremities   Coordination   Coordination Comments B toe taps and rapid alternating hand movements intact and symmetrical   Cervicogenic Screen   Neck ROM WFL   Vertebral Artery Test Normal   Oculomotor Exam   Ocular ROM Normal   Smooth Pursuit Normal   Saccades Normal   VOR Normal   Convergence Testing Normal   Infrared Goggle Exam or Frenzel Lense Exam   Exam completed with Room Light   Spontaneous Nystagmus Negative   Gaze Evoked Nystagmus Negative   Positional testing Negative   Positional Testing Comments No dizziness with supine <> sit or rolling either direction in bed   Clinical Impression   Criteria for Skilled Therapeutic Intervention Yes, treatment indicated   PT Diagnosis (PT) Impaired mobility   Influenced by the following  impairments Impaired balance, decreased activity tolerance, edema   Functional limitations due to impairments Increased difficulty with ambulation and stairs   Clinical Presentation (PT Evaluation Complexity) stable   Clinical Presentation Rationale Medical status, clinical judgement   Clinical Decision Making (Complexity) low complexity   Planned Therapy Interventions (PT) gait training;stair training;patient/family education;progressive activity/exercise   Risk & Benefits of therapy have been explained evaluation/treatment results reviewed;care plan/treatment goals reviewed;risks/benefits reviewed;current/potential barriers reviewed;participants voiced agreement with care plan;participants included;patient   Clinical Impression Comments OT Screen: independent with donning B shoes, good functional coordination on B UE, AOx4 with excellent safety awareness during session; has tub/shower with multiple grab bars at home   PT Total Evaluation Time   PT Eval, Low Complexity Minutes (51828) 13   Therapy Certification   Start of care date 03/12/25   Certification date from 03/12/25   Certification date to 03/12/25   Medical Diagnosis Dizziness   Physical Therapy Goals   PT Frequency One time eval and treatment only   PT Predicted Duration/Target Date for Goal Attainment 03/12/25   PT Goals Transfers;Gait;Stairs   PT: Transfers Modified independent;Sit to/from stand;Bed to/from chair;Assistive device  (Cane)   PT: Gait Supervision/stand-by assist;Straight cane;100 feet;Goal Met   PT: Stairs Supervision/stand-by assist;Greater than 10 stairs;Rail on left;Goal Met   Interventions   Interventions Quick Adds Gait Training;Therapeutic Activity   Therapeutic Activity   Therapeutic Activities: dynamic activities to improve functional performance Minutes (47598) 10   Symptoms Noted During/After Treatment None   Treatment Detail/Skilled Intervention Patient sitting in chair, agreeable to PT. Sit to stand from chair with FWW and  SBA, verbal cues for hand placements. Stand to sit onto EOB with SEC SBA. Sit <> stand with cane x 2 reps with cues for set up/cane positioning and SBA. Patient transferred back to chair wtih SEC and Mod I. Education on elevating B LEs to help with his chronic edema. Instruction on ankle pumps to help with circulation, good return demo by patient x 5 reps. Repeated sit <> stand from chair x 3 reps independently, discussed using this as  a functional exercise at home. Patient in recliner chair at end of session, alarm activated.   Gait Training   Gait Training Minutes (56993) 14   Symptoms Noted During/After Treatment (Gait Training) none   Treatment Detail/Skilled Intervention Patient ambulated ~ 120' in hallway with FWW and SBA, intermittent shuffling gait especially when focusing on upright posture. Short step length throughout. Patient ambulated up/down 4 stairs x 2 reps with cues for step-to pattern, good return demo using one railing ( on L ascending and wall on opposite side). Patient ambulated ~30' then stated he won't plan to use the walker at home, but will use his cane. Transitioned to gait with SEC, ambulated ~ 100' with SBA and good sequencing, no losses of balance. Patient verbalizing good safety awareness with using FWW when he feels weaker/more unsteady.   Distance in Feet 250   PT Discharge Planning   PT Plan Discharge from PT   PT Discharge Recommendation (DC Rec) home with assist   PT Rationale for DC Rec Patient is at or near baseline level of independence of mobiltiy with no further dizziness noted during assessment. Patient has good HEP routine he transitioned to after recenting completing outpatient PT for balance/strengthening. Patient has good safety awareness and assistive devices in place at home. No further acute PT needs at this time, recommend continuing HEP.   PT Brief overview of current status Goals of therapy will be to address safe mobility and make recs for d/c to next level of  care. Pt and RN will continue to follow all falls risk precautions as documented by RN staff while hospitalized.   PT Total Distance Amb During Session (feet) 275   PT Equipment Needed at Discharge   (has cane and walker at home)   Physical Therapy Time and Intention   Timed Code Treatment Minutes 24   Total Session Time (sum of timed and untimed services) 37       [facility]                                                                                   OUTPATIENT PHYSICAL THERAPY    PLAN OF TREATMENT FOR OUTPATIENT REHABILITATION   Patient's Last Name, First Name, Benjamin Acevedo YOB: 1935   Provider's Name   [facility]   Medical Record No.  [id number]     Onset Date: 03/11/25 Start of Care Date: 03/12/25     Medical Diagnosis:  Dizziness               PT Diagnosis:  Impaired mobility Certification Dates:  From: 03/12/25  To: 03/12/25       See note for plan of treatment, functional goals, and certification details.    I CERTIFY THE NEED FOR THESE SERVICES FURNISHED UNDER        THIS PLAN OF TREATMENT AND WHILE UNDER MY CARE (Physician co-signature of this document indicates review and certification of the therapy plan).

## 2025-03-12 NOTE — PROGRESS NOTES
RN received call from [facility] Medical Representative who got paged to this number. RN unsure who paged him but aware EKG just completed the cardiac device check for this patient and told representative this. Representative states everything looked good on his end with the device.   04-Mar-2024 16:32

## 2025-03-12 NOTE — PHARMACY-ADMISSION MEDICATION HISTORY
Pharmacist Admission Medication History    Admission medication history is complete. The information provided in this note is only as accurate as the sources available at the time of the update.    Information Source(s): Patient, Family member, and CareEverywhere/SureScripts via in-person    Pertinent Information: Patient stated he had not had medications since Sunday.    Changes made to PTA medication list:  Added: Vitamin B12  Deleted: None  Changed: None    Allergies reviewed with patient and updates made in EHR: yes    Medication History Completed By: Femi Leigh [facility] 3/12/2025 11:27 AM    PTA Med List   Medication Sig Last Dose/Taking    aspirin 81 MG EC tablet Take 81 mg by mouth daily Past Week Evening    cyanocobalamin (VITAMIN B-12) 1000 MCG tablet Take 1,000 mcg by mouth daily. Past Week    multivitamin, therapeutic (THERA-VIT) TABS tablet Take 1 tablet by mouth daily (with dinner) Past Week

## 2025-03-12 NOTE — PROGRESS NOTES
Observation Goals 3/12 1200     -diagnostic tests and consults completed and resulted Not met- pending PT/OT evals  -vital signs normal or at patient baseline Met  -returns to baseline functional status Met  -safe disposition plan has been identified Not met pending therapy evals    Nurse to notify provider when observation goals have been met and patient is ready for discharge.

## 2025-03-12 NOTE — H&P
Assessment & Plan     89M hx CVA, HFrEF, AV block s/p PPM, HTN, CAD, Afib presenting w/ recurrent dizziness from [facility].     --Hx: Patient presented to [facility] with complaints of dizziness upon waking on day of presentation, and upon walking , severe enough to require use of a walker. No aggrevated with head movements, only with standing/ambulation.  Meclizine doesn't help. No blurry vision or HA. No recent sick episodes, Patient reports hydrating well, and is not on diuretics.No falls. Of note, Did go to the ER 5/2024 for dizziness. In the ER tested positive for orthostatic hypotension. CTA at the time negative, neurology deferred MRI and recommended outpatient follow-up. Outpatient cardiology recommended switching xeralto to eliquis because the patient believed xeralto was causing the symptoms, but decided  against taking any DOACs. Presented to the ER for dizziness/LE weakness 2 more times and had follow-up w/ PT and vestibular therapy. Last BM 1d PTA, soft. At [facility], no dizziness while in bed.  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  Hx Afib not  DOAC  Trop 24>21  --PT OT  --MRI once pacemaker clearance  --telemetry  --fall precautions  --orthostatics   --Neurology consult  --Defer cardiology consult at this time until Pacemaker interrogation and TTE completed    Mild Leukopenia  --outpatient follow-up     Chronic stable conditions, resume PTA meds once confirmed  CKD2  Chronic anemia, thrombocytoepnia  Degenerative spine disease  SCC in situ R hsoulder  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 No aggrevated with head movements, only with standing/ambulation.  Meclizine doesn't help. No blurry vision or HA. No recent sick episodes, Patient reports hydrating well, and is not on diuretics.No falls. Of note, Did go to the ER 5/2024 for dizziness. In the ER tested positive for orthostatic hypotension. CTA at the time negative, neurology deferred MRI and recommended outpatient follow-up. Outpatient cardiology recommended switching xeralto to eliquis because the patient believed xeralto was causing the symptoms, but decided  against taking any DOACs. Presented to the ER for dizziness/LE weakness 2 more times and had follow-up w/ PT and vestibular therapy. Last BM 1d PTA, soft. At [facility], no dizziness while in bed.    ROS: 10 point ROS neg other than the symptoms noted above in the HPI.     Objective History  BP (!) 162/77 (BP Location: Right arm)   Pulse 58   Temp 97.4  F (36.3  C) (Oral)   Resp 18   Wt 83.4 kg (183 lb 13.8 oz)   SpO2 99%   BMI 24.26 kg/m      Constitutional: Alert and oriented to person, place and time; no apparent distress.   HEENT: Normocephalic, no scleral icterus, L pupil 4mm, R pupil 3mm. L directed saccades, no dizziness with headthrust  Abdomen: soft, no distention/tenderness/guarding  Lungs: lungs clear to auscultation bilaterally  Heart: Regular s1, split s2  Neuro:No focal strength/sensation deficits  Skin/Extremities: No obvious signs of skin breakdown, b/l  LE edema  Psychiatric: appropriate affect, insight and judgment  Back: No midline tenderness, no CVA tenderness    I have personally spent 76 minutes total time today in preparing to see the patient (eg, review of tests), obtaining and/or reviewing  separately obtained history, performing a medically appropriate examination and/or evaluation, counseling and educating the patient/family/caregiver, ordering medications, tests, or procedures, referring and communicating with other health care professionals, documenting clinical information in the electronic or other health record, independently interpreting results and communicating results to the patient/ family/caregiver and care coordination.

## 2025-03-12 NOTE — PROGRESS NOTES
Observation Goals 3/12 0800    -diagnostic tests and consults completed and resulted Not met- pending MRI  -vital signs normal or at patient baseline Met  -returns to baseline functional status Met  -safe disposition plan has been identified Not met pending therapy evals    Nurse to notify provider when observation goals have been met and patient is ready for discharge.

## 2025-03-12 NOTE — DISCHARGE SUMMARY
[facility]    Discharge Summary  Hospitalist    Date of Admission:  3/11/2025  Date of Discharge:  3/12/2025    Discharge Diagnoses   Impaired gait    History of Present Illness   Benjamin Elizabeth is an 89 year old male who presented with very unsteady gait    Hospital Course   Benjamin Elizabeth was admitted on 3/11/2025.  The following problems were addressed during his hospitalization:      89M hx CVA, HFrEF, AV block s/p PPM, HTN, CAD, Afib presenting w/ recurrent dizziness from [facility].      --Hx: Patient presented to [facility] with complaints of dizziness upon waking on day of presentation, and upon walking , severe enough to require use of a walker. No aggrevated with head movements, only with standing/ambulation.  Meclizine doesn't help. No blurry vision or HA. No recent sick episodes, Patient reports hydrating well, and is not on diuretics.No falls. Of note, Did go to the ER 5/2024 for dizziness. In the ER tested positive for orthostatic hypotension. CTA at the time negative, neurology deferred MRI and recommended outpatient follow-up. Outpatient cardiology recommended switching xeralto to eliquis because the patient believed xeralto was causing the symptoms, but decided  against taking any DOACs. Presented to the ER for dizziness/LE weakness 2 more times and had follow-up w/ PT and vestibular therapy. Last BM 1d PTA, soft. At [facility], no dizziness while in bed.  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  Negative.  --MRI o active for stroke.  Pacemaker evaluation did not show any abnormal rhythms.  --telemetry  --fall precautions  --orthostatics negative  --Neurology consult.  Appreciate recommendations.  -Ordered  vitamin B12 A1c, serum electrophoresis were all ordered.  Outpatient neurology referral placed.  Recommend continuing daily exercise.  There was possibility raised about progressive supranuclear palisade with parkinsonian plus syndromes.  This will have to be evaluated as an outpatient with multiple visits and patient is agreeable to this.  -At this time stroke, orthostasis and pacemaker dysfunction have all been ruled out as an etiology.  He was evaluated by therapy and cleared to go back home.  No changes were made to any of his medications.  --Echo was not pursued since his symptoms were more of unsteadiness of his feet rather than dizziness.  Can be pursued as outpatient if felt necessary by primary team.     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     Supportive Care  --DVT ppx: SCDs  --Contact/Seizure/Fall/Delerium Precautions: SW  --PT/OT/Social/Wound/Palliative Consults: PT IT  --Code Status: DNR DNI    Clinically Significant Risk Factors Present on Admission          # Hyperchloremia: Highest Cl = 109 mmol/L in last 2 days, will monitor as appropriate      # Hypocalcemia: Lowest Ca = 8.5 mg/dL in last 2 days, will monitor and replace as appropriate     # Hypoalbuminemia: Lowest albumin = 3.3 g/dL at 3/12/2025  7:51 AM, will monitor as appropriate   # Drug Induced Platelet Defect: home medication list includes an antiplatelet medication    # Chronic heart failure with reduced ejection fraction: last echo with EF <40%               # Pacemaker present        Shira Julian MD, MD      Code Status   DNR / DNI       Primary Care Physician   Je Zamudio    Physical Exam   Temp: 97.7  F (36.5  C) Temp src: Oral BP: 126/57 Pulse: 61   Resp: 17 SpO2: 97 % O2 Device: None (Room air)    Vitals:    03/11/25 2226    Weight: 83.4 kg (183 lb 13.8 oz)     Vital Signs with Ranges  Temp:  [97.4  F (36.3  C)-97.7  F (36.5  C)] 97.7  F (36.5  C)  Pulse:  [58-64] 61  Resp:  [17-19] 17  BP: (126-169)/(57-77) 126/57  SpO2:  [96 %-100 %] 97 %  I/O last 3 completed shifts:  In: -   Out: 310 [Urine:310]    Physical Exam  Constitutional:       Appearance: Normal appearance.   Cardiovascular:      Rate and Rhythm: Normal rate and regular rhythm.      Pulses: Normal pulses.      Heart sounds: Normal heart sounds.   Pulmonary:      Effort: Pulmonary effort is normal. No respiratory distress.      Breath sounds: Normal breath sounds.   Abdominal:      General: Abdomen is flat. Bowel sounds are normal. There is no distension.      Tenderness: There is no abdominal tenderness. There is no guarding.   Skin:     General: Skin is warm and dry.   Neurological:      General: No focal deficit present.      Comments: Unsteady gait           Discharge Disposition   Discharged to home  Condition at discharge: Stable    Consultations This Hospital Stay   NEUROLOGY IP CONSULT  OCCUPATIONAL THERAPY ADULT IP CONSULT  PHYSICAL THERAPY ADULT IP CONSULT  VESTIBULAR PHYSICAL THERAPY IP CONSULT    Time Spent on this Encounter   I, Shira Julian MD, personally saw the patient today and spent greater than 30 minutes discharging this patient.    Discharge Orders      Adult Neurology  Referral      Reason for your hospital stay    Unsteady gait     Activity    Your activity upon discharge: activity as tolerated     Diet    Follow this diet upon discharge: Current Diet:Orders Placed This Encounter      Regular Diet Adult     Hospital Follow-up with Existing Primary Care Provider (PCP)    Please see details below          Discharge Medications   Current Discharge Medication List        CONTINUE these medications which have NOT CHANGED    Details   aspirin 81 MG EC tablet Take 81 mg by mouth daily      cyanocobalamin (VITAMIN B-12) 1000 MCG tablet Take 1,000  mcg by mouth daily.      multivitamin, therapeutic (THERA-VIT) TABS tablet Take 1 tablet by mouth daily (with dinner)           Allergies   No Known Allergies  Data   Recent Labs   Lab Test 03/12/25  0751 03/11/25  1343 11/09/24  0937 09/30/22  0625 09/29/22  1211 09/22/22  0625 09/21/22  0642   WBC 3.6* 3.8* 4.2   < >  --    < > 5.4   HGB 11.6* 12.1* 11.5*   < >  --    < > 11.8*   MCV 97 96 96   < >  --    < > 96   * 125* 123*   < >  --    < > 118*   INR  --   --   --   --  1.12  --  1.07    < > = values in this interval not displayed.      Recent Labs   Lab Test 03/12/25  0751 03/11/25  1343 11/09/24  0937    141 137   POTASSIUM 4.5 4.6 4.4   CHLORIDE 109* 106 104   CO2 25 26 23   BUN 22.3 25.0* 20.9   CR 0.96 1.07 1.11   ANIONGAP 7 9 10   ERASTO 8.5* 9.1 8.7*   GLC 79 99 126*         Results for orders placed or performed during the hospital encounter of 03/11/25   MR Brain w/o & w Contrast    Narrative    EXAM: MR BRAIN W/O and W CONTRAST  LOCATION: [facility]  DATE: 3/12/2025    INDICATION: recurrent dizziness  COMPARISON: Head CT 11/9/2024.  CONTRAST: 8 mL Gadavist  TECHNIQUE: Routine multiplanar multisequence head MRI without and with intravenous contrast.    FINDINGS:  INTRACRANIAL CONTENTS: No acute or subacute infarct. No mass, acute hemorrhage, or extra-axial fluid collections. Patchy and confluent nonspecific T2/FLAIR hyperintensities within the cerebral white matter most consistent with moderate chronic   microvascular ischemic change. Mild generalized cerebral volume loss. Small areas of encephalomalacia and gliosis in the lateral left frontal lobe and left parietal lobe. Normal ventricles and sulci. Normal position of the cerebellar tonsils. No   pathologic contrast enhancement.    SELLA: No abnormality accounting for technique.    OSSEOUS STRUCTURES/SOFT TISSUES: Normal marrow signal. The major intracranial vascular flow voids are maintained.     ORBITS: Prior  bilateral cataract surgery. Visualized portions of the orbits are otherwise unremarkable.     SINUSES/MASTOIDS: Mild mucosal thickening scattered about the paranasal sinuses. Scattered fluid/membrane thickening in the left mastoid air cells. No apparent mass in the posterior nasopharynx or skull base.       Impression    IMPRESSION:  1.  No acute intracranial pathology.  2.  Chronic small vessel ischemic disease and generalized cerebral volume loss. Small areas of encephalomalacia and gliosis in the left frontal and parietal lobes.

## 2025-03-12 NOTE — CONSULTS
"[facility]  Neurology Consultation    Patient Name:  Benjamin Elizabeth  MRN:  [id number]    :  1935  Date of Service:  2025  Primary care provider:  Je Zamudio      Neurology consultation service was asked to see Benjamin Elizabeth by Dr. Esqueda to evaluate for recurrent dizziness.    Chief Complaint:  Unsteady gait, positive orthostatics per history    History of Present Illness:   Benjamin Elizabeth is a 89 year old male with history of CVA 2 years ago -lacunar infarct in the right frontal white matter, second-degree AV block who presents with recurrent \"dizziness.\"  Per chart review he describes dizziness upon waking, this is consistent when standing and walking now requiring use of a walker.  Orthostatics were checked at [facility] emergency department and were found to be positive (I am not able to see the actual numbers on review of the notes or vitals).  On chart review he has had multiple descriptions of orthostatic blood pressure going back to .    On my interview of the patient as well as collateral information from the patient's wife he denies any sensation of presyncope including any tunnel vision, any flashes of light in the visual field, any change in his hearing, or any diaphoresis.  He has not had any fainting episodes or loss of consciousness episodes either associated with walking or otherwise.  He describes that he feels \"unsteady\" when he is ambulating and this is improved if he is holding onto an object such as a bed rail or a walker when ambulating.  Gait is worse in the dark, so he has started using a urinal at bedside to avoid walking into his bathroom in the darkness.  He denies any numbness, tingling or burning of his feet.    On a parkinsonian review of systems, his wife described that his sense of smell is reduced, this was evident several years ago when she smelled a gas leak at home that Benjamin was not able to identify.  He " otherwise describes that his sense of taste is intact.  They deny any restlessness while sleeping, any vivid dreaming.  His writing has gotten progressively worse over the past couple of years and this is bothersome to him.  They deny any tremor at rest, though they have both noticed a postural tremor if he is holding an object.  He denies constipation.  His gait has become worse over several years, including that he takes multiple steps to turn and that he is hunched over.      ROS  A comprehensive ROS was performed and pertinent findings were included in HPI.     PMH  No past medical history on file.  Past Surgical History:   Procedure Laterality Date    EP PACEMAKER DEVICE & LEAD IMPLANT- RIGHT ATRIAL & LEFT VENTRICULAR N/A 9/22/2022    Procedure: Pacemaker Device & Lead Implant- Right Atrial & Left Ventricular;  Surgeon: Blaze Cain MD;  Location:  [facility]       Medications   I have personally reviewed the patient's medication list.     Allergies  I have personally reviewed the patient's allergy list.     Social History  Social History     Socioeconomic History    Marital status:      Spouse name: Not on file    Number of children: Not on file    Years of education: Not on file    Highest education level: Not on file   Occupational History    Not on file   Tobacco Use    Smoking status: Never    Smokeless tobacco: Never   Vaping Use    Vaping status: Never Used   Substance and Sexual Activity    Alcohol use: Not on file     Comment: 10 drinks a week    Drug use: Not on file    Sexual activity: Not on file   Other Topics Concern    Not on file   Social History Narrative    Not on file     Social Drivers of Health     Financial Resource Strain: Not on file   Food Insecurity: Not on file   Transportation Needs: Not on file   Physical Activity: Not on file   Stress: Not on file   Social Connections: Not on file   Interpersonal Safety: Not on file   Housing Stability: Not on file         Family History      No family history on file.       Physical Examination   Vitals: /67 (BP Location: Left arm)   Pulse 61   Temp 97.6  F (36.4  C) (Oral)   Resp 19   Wt 83.4 kg (183 lb 13.8 oz)   SpO2 96%   BMI 24.26 kg/m    General: Lying in bed, NAD  Head: NC/AT  Eyes: no icterus, op pink and moist  Cardiac: RRR. Extremities warm, no edema.   Respiratory: non-labored on RA  GI: S/NT/ND  Skin: No rash or lesion on exposed skin  Psych: Mood pleasant, affect congruent  Neuro:  Mental status: Awake, alert, attentive, oriented to self, time, place, and circumstance. Language is fluent and coherent with intact comprehension of complex commands, naming and repetition.  Cranial nerves:  Reduced blink rate is present, there is some degree of dysarthria-words run together.  There is restriction in upward gaze, though otherwise extraocular movements are intact.   VFF, conjugate gaze, EOMI, facial sensation intact, face symmetric, shoulder shrug strong, tongue/uvula midline.  Motor: Normal bulk and tone. No abnormal movements. 5/5 strength bilaterally in deltoids, biceps, triceps, hand , hip flexors, hip extensors, knee flexion, knee extension, plantarflexion, dorsiflexion.   Reflexes: Reflexes are bilateral, normal symmetric at the patella.  Reflexes are absent at the Achilles bilaterally.  Sensory: Reduced sensation to temperature in the legs and feet bilaterally especially when compared to the hands (he was not aware of this prior to the exam, though it is quite evident).  Proprioception is intact at the great toes to large movements, though not intact to very small movements.  Coordination: Decrement is present with bilateral finger tapping.  Toe tapping is normal and symmetrical bilaterally.  Gait: Uses a walker for stability, stance is somewhat broad, gait is shuffling with small steps, and he requires multiple steps to turn.  When he walks without the walker there is reduced arm swing bilaterally,  right greater than left.    Investigations   I have personally reviewed pertinent labs, tests, and radiological imaging. Discussion of notable findings is included under Impression.     Creatinine 0.96, normal sodium, normal potassium, normal liver enzymes  White count 3.6, hemoglobin 11.6, platelet 125    Urinalysis normal    MRI Brain 3/11  IMPRESSION:  1.  No acute intracranial pathology.  2.  Chronic small vessel ischemic disease and generalized cerebral volume loss. Small areas of encephalomalacia and gliosis in the left frontal and parietal lobes.  -Note that on my measurement the midbrain is 9.8 mm, arturo 19.8, with a midbrain to arturo ratio of 0.49    Was patient transferred from outside hospital?   No    Impression  #Gait impairment  #Length-dependent sensory neuropathy  #Parkinsonism    Mr. Elizabeth presents for evaluation of gait instability which appears to have been progressive over months leading up to dependence on walker over the past 1 to 2 weeks.  My examination is notable for a sensory neuropathy in the feet-this is clearly established in terms of temperature sensation in the hands compared to the lower legs and feet.  His proprioception is only mildly involved based on my examination.  He also does have some features of parkinsonism including a shuffling gait, turning en bloc, increased tone and decrement with rapid alternating movements, impairment of his handwriting, anosmia.  Each of these entities, sensory neuropathy or parkinsonism can lead to gait impairment and together the effects can be additive.  As below, I will round out his evaluation for neuropathy/reversible causes of neuropathy such as B12 deficiency, abnormally high blood sugar, or a monoclonal gammopathy (unlikely).    He does not have a clinical history to make me suspect hypotension as a cause of this given that he has no history of fainting, losing consciousness, and does not have any prodromal symptoms of low pressure.  I  checked orthostatic vitals from sitting to standing as part of my exam and he remained in the 130s to 120s systolic.    Regarding the parkinsonism, I shared with the patient and his wife that to make a more clear diagnosis I would want him to be seen by a neurologist over time.  Due to the restricted upward gaze, as well as a relatively small midbrain at 9.8 mm, with a low midbrain to arturo ratio, parkinson plus syndromes such as PSP can also be considered. Though some gaze restriction can be seen as part of the aging process, so it has to be taken in context.     Recommendations  -Continue multivitamin and B12  - Methylmalonic acid, A1c, serum electrophoresis ordered, pending  - Physical therapy for gait/vestibular training  - Outpatient neurology referral (ordered)   - Continue daily exercise at the gym including weight training, walking (with devices to maintain safety/stability), stationary bike  - If orthostatic hypotension is documented, or patient develop symptoms consistent with this, then I would consider typical conservative approaches including drinking water, sleeping with head of bed elevated, increasing salt intake, etc.  - Inpatient neurology will sign off at this time, please call if new questions arise    Thank you for involving Neurology in the care of Benjamin Elizabeth.  Please do not hesitate to call with questions.     Tyree Vance MD    I spent over 85 minutes on this consultation including discussion with the patient and his wife, chart review, examination, discussion of the assessment and plan, documentation.

## 2025-03-12 NOTE — PLAN OF CARE
Pt here with dizziness. A&Ox4. Neuros intact, denies dizziness this shift. Tele paced. Reg diet, thin liquids. Takes pills whole. Up with A1 GB/W. Denies pain. Pt scoring green on the Aggression Stop Light Tool. Plan PT eval. Discharge pending.

## 2025-03-12 NOTE — PLAN OF CARE
Goal Outcome Evaluation:      Plan of Care Reviewed With: patient    Overall Patient Progress: improvingOverall Patient Progress: improving     Reason for Admission: Recurrent dizziness  Cognitive/Mentation: A/Ox 4  Neuros/CMS: Stable w/ gen weakness  VS: stable on RA. SBP <180  Tele: 100% AV paced.  GI/: Continent, voiding adequately  Pain: denies.   Drains/Lines: R PIV SL  Skin: Intact. +2 BLE edema.  Activity: Assist x 1 with GBW.  Diet: Regular with thin liquids. Takes pills whole.   Therapies recs: pending  Discharge: pending workup  Aggression Stoplight Tool: green  End of shift summary: Plan for neuro consult, echo + MRI today. MRI checklist still needed

## 2025-03-17 ENCOUNTER — ANCILLARY PROCEDURE (OUTPATIENT)
Dept: CARDIOLOGY | Facility: CLINIC | Age: 89
End: 2025-03-17
Attending: INTERNAL MEDICINE
Payer: COMMERCIAL

## 2025-03-17 DIAGNOSIS — Z95.0 CARDIAC PACEMAKER IN SITU: ICD-10-CM

## 2025-03-17 DIAGNOSIS — I48.0 PAROXYSMAL ATRIAL FIBRILLATION (H): ICD-10-CM

## 2025-03-17 DIAGNOSIS — I44.1 SECOND DEGREE ATRIOVENTRICULAR BLOCK: ICD-10-CM

## 2025-03-17 PROCEDURE — 93296 REM INTERROG EVL PM/IDS: CPT | Performed by: INTERNAL MEDICINE

## 2025-03-17 PROCEDURE — 93294 REM INTERROG EVL PM/LDLS PM: CPT | Performed by: INTERNAL MEDICINE

## 2025-03-18 LAB
MDC_IDC_EPISODE_DTM: NORMAL
MDC_IDC_EPISODE_ID: NORMAL
MDC_IDC_EPISODE_TYPE: NORMAL
MDC_IDC_LEAD_CONNECTION_STATUS: NORMAL
MDC_IDC_LEAD_CONNECTION_STATUS: NORMAL
MDC_IDC_LEAD_IMPLANT_DT: NORMAL
MDC_IDC_LEAD_IMPLANT_DT: NORMAL
MDC_IDC_LEAD_LOCATION: NORMAL
MDC_IDC_LEAD_LOCATION: NORMAL
MDC_IDC_LEAD_LOCATION_DETAIL_1: NORMAL
MDC_IDC_LEAD_LOCATION_DETAIL_1: NORMAL
MDC_IDC_LEAD_MFG: NORMAL
MDC_IDC_LEAD_MFG: NORMAL
MDC_IDC_LEAD_MODEL: NORMAL
MDC_IDC_LEAD_MODEL: NORMAL
MDC_IDC_LEAD_POLARITY_TYPE: NORMAL
MDC_IDC_LEAD_POLARITY_TYPE: NORMAL
MDC_IDC_LEAD_SERIAL: NORMAL
MDC_IDC_LEAD_SERIAL: NORMAL
MDC_IDC_MSMT_BATTERY_DTM: NORMAL
MDC_IDC_MSMT_BATTERY_REMAINING_LONGEVITY: 120 MO
MDC_IDC_MSMT_BATTERY_REMAINING_PERCENTAGE: 100 %
MDC_IDC_MSMT_BATTERY_STATUS: NORMAL
MDC_IDC_MSMT_LEADCHNL_RA_IMPEDANCE_VALUE: 634 OHM
MDC_IDC_MSMT_LEADCHNL_RA_PACING_THRESHOLD_AMPLITUDE: 0.4 V
MDC_IDC_MSMT_LEADCHNL_RA_PACING_THRESHOLD_PULSEWIDTH: 0.4 MS
MDC_IDC_MSMT_LEADCHNL_RV_IMPEDANCE_VALUE: 645 OHM
MDC_IDC_MSMT_LEADCHNL_RV_PACING_THRESHOLD_AMPLITUDE: 0.8 V
MDC_IDC_MSMT_LEADCHNL_RV_PACING_THRESHOLD_PULSEWIDTH: 0.4 MS
MDC_IDC_PG_IMPLANT_DTM: NORMAL
MDC_IDC_PG_MFG: NORMAL
MDC_IDC_PG_MODEL: NORMAL
MDC_IDC_PG_SERIAL: NORMAL
MDC_IDC_PG_TYPE: NORMAL
MDC_IDC_SESS_CLINIC_NAME: NORMAL
MDC_IDC_SESS_DTM: NORMAL
MDC_IDC_SESS_TYPE: NORMAL
MDC_IDC_SET_BRADY_AT_MODE_SWITCH_MODE: NORMAL
MDC_IDC_SET_BRADY_AT_MODE_SWITCH_RATE: 170 {BEATS}/MIN
MDC_IDC_SET_BRADY_LOWRATE: 60 {BEATS}/MIN
MDC_IDC_SET_BRADY_MAX_SENSOR_RATE: 130 {BEATS}/MIN
MDC_IDC_SET_BRADY_MAX_TRACKING_RATE: 130 {BEATS}/MIN
MDC_IDC_SET_BRADY_MODE: NORMAL
MDC_IDC_SET_BRADY_PAV_DELAY_HIGH: 150 MS
MDC_IDC_SET_BRADY_PAV_DELAY_LOW: 200 MS
MDC_IDC_SET_BRADY_SAV_DELAY_HIGH: 150 MS
MDC_IDC_SET_BRADY_SAV_DELAY_LOW: 200 MS
MDC_IDC_SET_LEADCHNL_RA_PACING_AMPLITUDE: 2.2 V
MDC_IDC_SET_LEADCHNL_RA_PACING_CAPTURE_MODE: NORMAL
MDC_IDC_SET_LEADCHNL_RA_PACING_POLARITY: NORMAL
MDC_IDC_SET_LEADCHNL_RA_PACING_PULSEWIDTH: 0.4 MS
MDC_IDC_SET_LEADCHNL_RA_SENSING_ADAPTATION_MODE: NORMAL
MDC_IDC_SET_LEADCHNL_RA_SENSING_POLARITY: NORMAL
MDC_IDC_SET_LEADCHNL_RA_SENSING_SENSITIVITY: 0.25 MV
MDC_IDC_SET_LEADCHNL_RV_PACING_AMPLITUDE: 1.3 V
MDC_IDC_SET_LEADCHNL_RV_PACING_CAPTURE_MODE: NORMAL
MDC_IDC_SET_LEADCHNL_RV_PACING_POLARITY: NORMAL
MDC_IDC_SET_LEADCHNL_RV_PACING_PULSEWIDTH: 0.4 MS
MDC_IDC_SET_LEADCHNL_RV_SENSING_ADAPTATION_MODE: NORMAL
MDC_IDC_SET_LEADCHNL_RV_SENSING_POLARITY: NORMAL
MDC_IDC_SET_LEADCHNL_RV_SENSING_SENSITIVITY: 1.5 MV
MDC_IDC_SET_ZONE_DETECTION_INTERVAL: 375 MS
MDC_IDC_SET_ZONE_STATUS: NORMAL
MDC_IDC_SET_ZONE_TYPE: NORMAL
MDC_IDC_SET_ZONE_VENDOR_TYPE: NORMAL
MDC_IDC_STAT_AT_BURDEN_PERCENT: 1 %
MDC_IDC_STAT_AT_DTM_END: NORMAL
MDC_IDC_STAT_AT_DTM_START: NORMAL
MDC_IDC_STAT_BRADY_DTM_END: NORMAL
MDC_IDC_STAT_BRADY_DTM_START: NORMAL
MDC_IDC_STAT_BRADY_RA_PERCENT_PACED: 83 %
MDC_IDC_STAT_BRADY_RV_PERCENT_PACED: 99 %
MDC_IDC_STAT_EPISODE_RECENT_COUNT: 0
MDC_IDC_STAT_EPISODE_RECENT_COUNT: 0
MDC_IDC_STAT_EPISODE_RECENT_COUNT: 1
MDC_IDC_STAT_EPISODE_RECENT_COUNT_DTM_END: NORMAL
MDC_IDC_STAT_EPISODE_RECENT_COUNT_DTM_START: NORMAL
MDC_IDC_STAT_EPISODE_TYPE: NORMAL
MDC_IDC_STAT_EPISODE_VENDOR_TYPE: NORMAL
MDC_IDC_STAT_EPISODE_VENDOR_TYPE: NORMAL

## 2025-03-24 NOTE — PROGRESS NOTES
(Late entry - 3/24/25)    A device interrogation was also requested to rule out arrhythmias as cause of dizziness. This check was completed 3/12 1323.     Device is stable. No changes or episodes logged since post-MRI check completed earlier today. Presenting rhythm shows occasional PVCs.    ANGEL Nunn

## 2025-03-24 NOTE — PROGRESS NOTES
(Late Entry - 3/24/25)    [facility] Consult received on 3/12/25 from [facility] after patient presented to [facility] ED for evaluation of recurrent dizziness. He was transferred to [facility] for possible MRI.   MRI was completed on 3/12 @ 0910. Post MRI check as below:    [employer] (D) PPM - [employer] Consult  Presenting Rhythm: AS/AP- 60's  Battery Status: 11 years  Leads: stable    Mode: DDDR     AP: 83%    : 99%    Episodes: 1 mode switch logged on 12/30/24 @ 0031 lasting 6m47s. EGM not available for review.  Heart Rate: stable, mostly in the 60's with some variability into the 70's      Post MRI Check complete.  Normal device function. Restored to pre-MRI settings    ANGEL Nunn

## 2025-04-13 ENCOUNTER — HEALTH MAINTENANCE LETTER (OUTPATIENT)
Age: 89
End: 2025-04-13

## 2025-05-21 NOTE — CONFIDENTIAL NOTE
NEUROLOGY - PRE VISIT PLANNING             Referring Provider Reason for Referral Office Visit Notes   Tyree Vance MD  Impaired gait   My Clinical Question Is:   Gait instability, some features of parkinsonism. Also with a mild sensory neuropathy. PLease assess for PD vs parkinson-plus  3/11/2025 - 3/12/2025 - ED        IMAGING/NOTES/SCANS Status/ Location  DATE   MRI/MRA(HEAD, NECK, SPINE) Internal 3/12/2025,  9/19/2022    CT/CTA   Internal  11/9/2024, 7/22/2024, 5/8/2024 9/30/2022,  9/29/2022    EMG N/A    EEG N/A    LABS Internal, External    Neuropsychological testing  N/A    Additional Testing/Notes N/A      Does patient have C2C?  Year last updated Action     YES   []      Please update at appointment if outdated more than 5 years       NO     [x]   N/A   Please complete C2C at appointment

## 2025-05-28 ENCOUNTER — TELEPHONE (OUTPATIENT)
Dept: CARDIOLOGY | Facility: CLINIC | Age: 89
End: 2025-05-28
Payer: COMMERCIAL

## 2025-05-28 DIAGNOSIS — I48.0 PAROXYSMAL ATRIAL FIBRILLATION (H): ICD-10-CM

## 2025-05-28 NOTE — TELEPHONE ENCOUNTER
[facility] Call Center    Phone Message    May a detailed message be left on voicemail: yes     Reason for Call: Medication Refill Request    Has the patient contacted the pharmacy for the refill? Yes   Name of medication being requested: apixaban ANTICOAGULANT (ELIQUIS ANTICOAGULANT) 5 MG tablet   Provider who prescribed the medication: Karl Guthrie MD f   [facility] [address]   Date medication is needed: ASAP    Pt is completely out of medication and needs script sent as soon as possible     Action Taken: Other: cardio    Travel Screening: Not Applicable     Date of Service:     Thank you!  [facility]

## 2025-08-14 ENCOUNTER — OFFICE VISIT (OUTPATIENT)
Dept: NEUROLOGY | Facility: CLINIC | Age: 89
End: 2025-08-14
Attending: STUDENT IN AN ORGANIZED HEALTH CARE EDUCATION/TRAINING PROGRAM
Payer: COMMERCIAL

## 2025-08-14 ENCOUNTER — PRE VISIT (OUTPATIENT)
Dept: NEUROLOGY | Facility: CLINIC | Age: 89
End: 2025-08-14

## 2025-08-14 VITALS
SYSTOLIC BLOOD PRESSURE: 138 MMHG | DIASTOLIC BLOOD PRESSURE: 73 MMHG | WEIGHT: 188 LBS | OXYGEN SATURATION: 98 % | BODY MASS INDEX: 24.8 KG/M2 | HEART RATE: 60 BPM

## 2025-08-14 DIAGNOSIS — G20.C PARKINSONISM, UNSPECIFIED PARKINSONISM TYPE (H): ICD-10-CM

## 2025-08-14 DIAGNOSIS — R41.89 COGNITIVE IMPAIRMENT: Primary | ICD-10-CM

## 2025-08-14 DIAGNOSIS — R26.9 IMPAIRED GAIT: ICD-10-CM

## 2025-08-14 RX ORDER — CARBIDOPA AND LEVODOPA 25; 100 MG/1; MG/1
TABLET ORAL
Qty: 90 TABLET | Refills: 5 | Status: SHIPPED | OUTPATIENT
Start: 2025-08-14

## (undated) DEVICE — RAD INTRODUCER KIT MICRO 5FRX10CM .018 NITINOL G/W

## (undated) DEVICE — PACK PCMKR PERM SRG PROC LF SAN32PC573

## (undated) DEVICE — CABLE PACING ALLIGATOR CLIP 301-CG

## (undated) DEVICE — DEFIB PRO-PADZ LVP LQD GEL ADULT 8900-2105-01

## (undated) DEVICE — SHEATH PRELUDE SNAP 13CM 6FR

## (undated) RX ORDER — LIDOCAINE HYDROCHLORIDE 10 MG/ML
INJECTION, SOLUTION EPIDURAL; INFILTRATION; INTRACAUDAL; PERINEURAL
Status: DISPENSED
Start: 2022-09-22

## (undated) RX ORDER — BUPIVACAINE HYDROCHLORIDE 2.5 MG/ML
INJECTION, SOLUTION EPIDURAL; INFILTRATION; INTRACAUDAL
Status: DISPENSED
Start: 2022-09-22

## (undated) RX ORDER — FENTANYL CITRATE 50 UG/ML
INJECTION, SOLUTION INTRAMUSCULAR; INTRAVENOUS
Status: DISPENSED
Start: 2022-09-22